# Patient Record
Sex: MALE | Race: WHITE | URBAN - METROPOLITAN AREA
[De-identification: names, ages, dates, MRNs, and addresses within clinical notes are randomized per-mention and may not be internally consistent; named-entity substitution may affect disease eponyms.]

---

## 2024-06-15 ENCOUNTER — APPOINTMENT (OUTPATIENT)
Dept: CT IMAGING | Age: 74
End: 2024-06-15
Payer: MEDICARE

## 2024-06-15 ENCOUNTER — HOSPITAL ENCOUNTER (INPATIENT)
Age: 74
LOS: 7 days | Discharge: HOME | End: 2024-06-22
Attending: EMERGENCY MEDICINE | Admitting: STUDENT IN AN ORGANIZED HEALTH CARE EDUCATION/TRAINING PROGRAM
Payer: MEDICARE

## 2024-06-15 DIAGNOSIS — I49.8 BIGEMINY: ICD-10-CM

## 2024-06-15 DIAGNOSIS — D69.6 THROMBOCYTOPENIA (HCC): Primary | ICD-10-CM

## 2024-06-15 PROBLEM — N18.32 CKD STAGE 3B, GFR 30-44 ML/MIN (HCC): Status: ACTIVE | Noted: 2024-06-15

## 2024-06-15 LAB
ABO + RH BLD: NORMAL
ANION GAP SERPL CALCULATED.3IONS-SCNC: 13 MMOL/L (ref 9–17)
ARM BAND NUMBER: NORMAL
BASOPHILS # BLD: 0.05 K/UL (ref 0–0.2)
BASOPHILS NFR BLD: 1 % (ref 0–2)
BLOOD BANK SAMPLE EXPIRATION: NORMAL
BLOOD GROUP ANTIBODIES SERPL: NEGATIVE
BUN SERPL-MCNC: 26 MG/DL (ref 8–23)
CALCIUM SERPL-MCNC: 9 MG/DL (ref 8.6–10.4)
CHLORIDE SERPL-SCNC: 112 MMOL/L (ref 98–107)
CO2 SERPL-SCNC: 20 MMOL/L (ref 20–31)
CREAT SERPL-MCNC: 1.4 MG/DL (ref 0.7–1.2)
EOSINOPHIL # BLD: 0.34 K/UL (ref 0–0.4)
EOSINOPHILS RELATIVE PERCENT: 7 % (ref 1–4)
ERYTHROCYTE [DISTWIDTH] IN BLOOD BY AUTOMATED COUNT: 13.9 % (ref 12.5–15.4)
GFR, ESTIMATED: 53 ML/MIN/1.73M2
GLUCOSE SERPL-MCNC: 110 MG/DL (ref 70–99)
HCT VFR BLD AUTO: 33.4 % (ref 41–53)
HGB BLD-MCNC: 11.3 G/DL (ref 13.5–17.5)
INR PPP: 1.1
LYMPHOCYTES NFR BLD: 0.86 K/UL (ref 1–4.8)
LYMPHOCYTES RELATIVE PERCENT: 18 % (ref 24–44)
MAGNESIUM SERPL-MCNC: 2.3 MG/DL (ref 1.6–2.6)
MCH RBC QN AUTO: 29 PG (ref 26–34)
MCHC RBC AUTO-ENTMCNC: 33.8 G/DL (ref 31–37)
MCV RBC AUTO: 85.7 FL (ref 80–100)
MONOCYTES NFR BLD: 0.62 K/UL (ref 0.1–1.2)
MONOCYTES NFR BLD: 13 % (ref 2–11)
MORPHOLOGY: NORMAL
NEUTROPHILS NFR BLD: 61 % (ref 36–66)
NEUTS SEG NFR BLD: 2.93 K/UL (ref 1.8–7.7)
PLATELET # BLD AUTO: 3 K/UL (ref 140–450)
PMV BLD AUTO: 10.7 FL (ref 6–12)
POTASSIUM SERPL-SCNC: 4.1 MMOL/L (ref 3.7–5.3)
PROTHROMBIN TIME: 12.1 SEC (ref 9.4–12.6)
RBC # BLD AUTO: 3.9 M/UL (ref 4.5–5.9)
SODIUM SERPL-SCNC: 145 MMOL/L (ref 135–144)
WBC OTHER # BLD: 4.8 K/UL (ref 3.5–11)

## 2024-06-15 PROCEDURE — 6370000000 HC RX 637 (ALT 250 FOR IP): Performed by: STUDENT IN AN ORGANIZED HEALTH CARE EDUCATION/TRAINING PROGRAM

## 2024-06-15 PROCEDURE — 86901 BLOOD TYPING SEROLOGIC RH(D): CPT

## 2024-06-15 PROCEDURE — 99285 EMERGENCY DEPT VISIT HI MDM: CPT

## 2024-06-15 PROCEDURE — 36430 TRANSFUSION BLD/BLD COMPNT: CPT

## 2024-06-15 PROCEDURE — 2580000003 HC RX 258: Performed by: STUDENT IN AN ORGANIZED HEALTH CARE EDUCATION/TRAINING PROGRAM

## 2024-06-15 PROCEDURE — 85025 COMPLETE CBC W/AUTO DIFF WBC: CPT

## 2024-06-15 PROCEDURE — 93005 ELECTROCARDIOGRAM TRACING: CPT | Performed by: NURSE PRACTITIONER

## 2024-06-15 PROCEDURE — 80048 BASIC METABOLIC PNL TOTAL CA: CPT

## 2024-06-15 PROCEDURE — 85610 PROTHROMBIN TIME: CPT

## 2024-06-15 PROCEDURE — 71250 CT THORAX DX C-: CPT

## 2024-06-15 PROCEDURE — 86850 RBC ANTIBODY SCREEN: CPT

## 2024-06-15 PROCEDURE — 2060000000 HC ICU INTERMEDIATE R&B

## 2024-06-15 PROCEDURE — 36415 COLL VENOUS BLD VENIPUNCTURE: CPT

## 2024-06-15 PROCEDURE — 83010 ASSAY OF HAPTOGLOBIN QUANT: CPT

## 2024-06-15 PROCEDURE — 83735 ASSAY OF MAGNESIUM: CPT

## 2024-06-15 PROCEDURE — 86900 BLOOD TYPING SEROLOGIC ABO: CPT

## 2024-06-15 PROCEDURE — 6360000002 HC RX W HCPCS: Performed by: INTERNAL MEDICINE

## 2024-06-15 PROCEDURE — P9073 PLATELETS PHERESIS PATH REDU: HCPCS

## 2024-06-15 PROCEDURE — 30233N1 TRANSFUSION OF NONAUTOLOGOUS RED BLOOD CELLS INTO PERIPHERAL VEIN, PERCUTANEOUS APPROACH: ICD-10-PCS | Performed by: INTERNAL MEDICINE

## 2024-06-15 PROCEDURE — 99222 1ST HOSP IP/OBS MODERATE 55: CPT | Performed by: STUDENT IN AN ORGANIZED HEALTH CARE EDUCATION/TRAINING PROGRAM

## 2024-06-15 PROCEDURE — 83615 LACTATE (LD) (LDH) ENZYME: CPT

## 2024-06-15 RX ORDER — PANTOPRAZOLE SODIUM 40 MG/1
40 TABLET, DELAYED RELEASE ORAL
Status: DISCONTINUED | OUTPATIENT
Start: 2024-06-16 | End: 2024-06-16

## 2024-06-15 RX ORDER — POTASSIUM CHLORIDE 20 MEQ/1
40 TABLET, EXTENDED RELEASE ORAL PRN
Status: DISCONTINUED | OUTPATIENT
Start: 2024-06-15 | End: 2024-06-22 | Stop reason: HOSPADM

## 2024-06-15 RX ORDER — FENOFIBRATE 160 MG/1
160 TABLET ORAL DAILY
Status: DISCONTINUED | OUTPATIENT
Start: 2024-06-15 | End: 2024-06-22 | Stop reason: HOSPADM

## 2024-06-15 RX ORDER — ACETAMINOPHEN 325 MG/1
650 TABLET ORAL EVERY 6 HOURS PRN
Status: DISCONTINUED | OUTPATIENT
Start: 2024-06-15 | End: 2024-06-22 | Stop reason: HOSPADM

## 2024-06-15 RX ORDER — SODIUM CHLORIDE 0.9 % (FLUSH) 0.9 %
5-40 SYRINGE (ML) INJECTION PRN
Status: DISCONTINUED | OUTPATIENT
Start: 2024-06-15 | End: 2024-06-22 | Stop reason: HOSPADM

## 2024-06-15 RX ORDER — MAGNESIUM SULFATE IN WATER 40 MG/ML
2000 INJECTION, SOLUTION INTRAVENOUS PRN
Status: DISCONTINUED | OUTPATIENT
Start: 2024-06-15 | End: 2024-06-22 | Stop reason: HOSPADM

## 2024-06-15 RX ORDER — SODIUM CHLORIDE 0.9 % (FLUSH) 0.9 %
5-40 SYRINGE (ML) INJECTION EVERY 12 HOURS SCHEDULED
Status: DISCONTINUED | OUTPATIENT
Start: 2024-06-15 | End: 2024-06-22 | Stop reason: HOSPADM

## 2024-06-15 RX ORDER — BRIMONIDINE TARTRATE 2 MG/ML
1 SOLUTION/ DROPS OPHTHALMIC 2 TIMES DAILY
Status: DISCONTINUED | OUTPATIENT
Start: 2024-06-15 | End: 2024-06-22 | Stop reason: HOSPADM

## 2024-06-15 RX ORDER — BRIMONIDINE TARTRATE 1 MG/ML
1 SOLUTION/ DROPS OPHTHALMIC 2 TIMES DAILY
COMMUNITY

## 2024-06-15 RX ORDER — HYDROCHLOROTHIAZIDE 25 MG/1
25 TABLET ORAL DAILY
Status: DISCONTINUED | OUTPATIENT
Start: 2024-06-16 | End: 2024-06-15

## 2024-06-15 RX ORDER — SODIUM CHLORIDE 9 MG/ML
INJECTION, SOLUTION INTRAVENOUS PRN
Status: DISCONTINUED | OUTPATIENT
Start: 2024-06-15 | End: 2024-06-22 | Stop reason: HOSPADM

## 2024-06-15 RX ORDER — CARVEDILOL 12.5 MG/1
25 TABLET ORAL 2 TIMES DAILY WITH MEALS
Status: DISCONTINUED | OUTPATIENT
Start: 2024-06-15 | End: 2024-06-22 | Stop reason: HOSPADM

## 2024-06-15 RX ORDER — LISINOPRIL 40 MG/1
40 TABLET ORAL DAILY
COMMUNITY

## 2024-06-15 RX ORDER — ACETAMINOPHEN 650 MG/1
650 SUPPOSITORY RECTAL EVERY 6 HOURS PRN
Status: DISCONTINUED | OUTPATIENT
Start: 2024-06-15 | End: 2024-06-22 | Stop reason: HOSPADM

## 2024-06-15 RX ORDER — POTASSIUM CHLORIDE 7.45 MG/ML
10 INJECTION INTRAVENOUS PRN
Status: DISCONTINUED | OUTPATIENT
Start: 2024-06-15 | End: 2024-06-22 | Stop reason: HOSPADM

## 2024-06-15 RX ORDER — ONDANSETRON 4 MG/1
4 TABLET, ORALLY DISINTEGRATING ORAL EVERY 8 HOURS PRN
Status: DISCONTINUED | OUTPATIENT
Start: 2024-06-15 | End: 2024-06-22 | Stop reason: HOSPADM

## 2024-06-15 RX ORDER — VALSARTAN 160 MG/1
320 TABLET ORAL DAILY
Status: DISCONTINUED | OUTPATIENT
Start: 2024-06-16 | End: 2024-06-15

## 2024-06-15 RX ORDER — VALSARTAN AND HYDROCHLOROTHIAZIDE 320; 25 MG/1; MG/1
1 TABLET, FILM COATED ORAL DAILY
Status: DISCONTINUED | OUTPATIENT
Start: 2024-06-16 | End: 2024-06-15

## 2024-06-15 RX ORDER — POLYETHYLENE GLYCOL 3350 17 G/17G
17 POWDER, FOR SOLUTION ORAL DAILY PRN
Status: DISCONTINUED | OUTPATIENT
Start: 2024-06-15 | End: 2024-06-22 | Stop reason: HOSPADM

## 2024-06-15 RX ORDER — SODIUM CHLORIDE 9 MG/ML
INJECTION, SOLUTION INTRAVENOUS PRN
Status: COMPLETED | OUTPATIENT
Start: 2024-06-15 | End: 2024-06-15

## 2024-06-15 RX ORDER — LISINOPRIL 20 MG/1
40 TABLET ORAL DAILY
Status: DISCONTINUED | OUTPATIENT
Start: 2024-06-16 | End: 2024-06-22 | Stop reason: HOSPADM

## 2024-06-15 RX ORDER — ONDANSETRON 2 MG/ML
4 INJECTION INTRAMUSCULAR; INTRAVENOUS EVERY 6 HOURS PRN
Status: DISCONTINUED | OUTPATIENT
Start: 2024-06-15 | End: 2024-06-22 | Stop reason: HOSPADM

## 2024-06-15 RX ORDER — ATORVASTATIN CALCIUM 20 MG/1
20 TABLET, FILM COATED ORAL DAILY
Status: DISCONTINUED | OUTPATIENT
Start: 2024-06-15 | End: 2024-06-22 | Stop reason: HOSPADM

## 2024-06-15 RX ORDER — DEXAMETHASONE 4 MG/1
40 TABLET ORAL DAILY
Status: COMPLETED | OUTPATIENT
Start: 2024-06-15 | End: 2024-06-18

## 2024-06-15 RX ORDER — CARVEDILOL 12.5 MG/1
25 TABLET ORAL 2 TIMES DAILY WITH MEALS
Status: CANCELLED | OUTPATIENT
Start: 2024-06-15

## 2024-06-15 RX ADMIN — SODIUM CHLORIDE: 9 INJECTION, SOLUTION INTRAVENOUS at 21:36

## 2024-06-15 RX ADMIN — CARVEDILOL 25 MG: 12.5 TABLET, FILM COATED ORAL at 17:49

## 2024-06-15 RX ADMIN — BRIMONIDINE TARTRATE 1 DROP: 2 SOLUTION OPHTHALMIC at 21:37

## 2024-06-15 RX ADMIN — FENOFIBRATE 160 MG: 160 TABLET ORAL at 17:47

## 2024-06-15 RX ADMIN — ATORVASTATIN CALCIUM 20 MG: 20 TABLET, FILM COATED ORAL at 17:47

## 2024-06-15 RX ADMIN — DEXAMETHASONE 40 MG: 4 TABLET ORAL at 21:37

## 2024-06-15 RX ADMIN — SODIUM CHLORIDE, PRESERVATIVE FREE 10 ML: 5 INJECTION INTRAVENOUS at 21:34

## 2024-06-15 ASSESSMENT — PAIN - FUNCTIONAL ASSESSMENT: PAIN_FUNCTIONAL_ASSESSMENT: NONE - DENIES PAIN

## 2024-06-15 NOTE — ED PROVIDER NOTES
[facility] EMERGENCY DEPARTMENT  EMERGENCY DEPARTMENT ENCOUNTER      Pt Name: Bruno Morales  MRN: [id number]  Birthdate 1950  Date of evaluation: 6/15/2024  Provider: CRISTHIAN Clements CNP  4:01 PM    CHIEF COMPLAINT     No chief complaint on file.        HISTORY OF PRESENT ILLNESS    Bruno Morales is a 73 y.o. male who presents to the emergency department for evaluation of abnormal labs.  Patient states that he went to a [facility] draw site this morning to have labs drawn because he has been bruising easily, his family doctor called him back to let them know that he had severe thrombocytopenia and to come in.  Labs reviewed, platelet count is less than 3.  He states he has been bruising easily for the past month or so.  He reports some bleeding from the gumline.  He was eating an ice cream cone and feels that he may have had a piece of ice cream, lodged by his teeth.  He has a bruise to the left forearm and he has a bruise to the right lower leg, bilateral lower legs have petechiae.  No chest pain no shortness of breath.  He does report some lightheadedness was discussing this with his family doctor yesterday and they decreased his amlodipine.  He denies any trauma.  No falls.    HPI    Nursing Notes were reviewed.    REVIEW OF SYSTEMS       Review of Systems   All other systems reviewed and are negative.      Except as noted above the remainder of the review of systems was reviewed and negative.       PAST MEDICAL HISTORY     Past Medical History:   Diagnosis Date    Hyperlipidemia     Hypertension          SURGICAL HISTORY       Past Surgical History:   Procedure Laterality Date    TONSILLECTOMY           CURRENT MEDICATIONS       Previous Medications    CARVEDILOL (COREG) 12.5 MG TABLET    Take 25 mg by mouth 2 times daily (with meals)    CLOPIDOGREL (PLAVIX) 75 MG TABLET    Take 75 mg by mouth daily    FENOFIBRATE 160 MG TABLET    Take 160 mg by mouth daily    LORATADINE 10 MG  returned as of this dictation.    EMERGENCY DEPARTMENT COURSE and DIFFERENTIAL DIAGNOSIS/MDM:   Vitals:    Vitals:    06/15/24 1536 06/15/24 1539   BP:  (!) 178/67   Pulse: 70 72   Resp: 22 27   Temp: 98.3 °F (36.8 °C)    TempSrc: Oral    SpO2: 97% 96%   Weight: 71.7 kg (158 lb)    Height: 1.778 m (5' 10\")            Medical Decision Making  Amount and/or Complexity of Data Reviewed  Labs: ordered.  ECG/medicine tests: ordered.    Risk  Decision regarding hospitalization.        Patient Mesfin to the emergency department for evaluation of thrombocytopenia.  He had labs drawn this morning which showed platelet count less than 3.  No history of this.  Old records reviewed and in October, his platelets were 159.  He is on Plavix but did not take his dose today.  He has not fallen.  He does have petechial rash to bilateral lower extremities and some bruising on the left forearm.  No fevers no chills no nausea no vomiting no headache no chest pain no shortness of breath.  Case was discussed with Dr. Be who is agreeable to admit.  At this point, he states he will take care of ordering CTs, consultation with heme-onc        REASSESSMENT          CRITICAL CARE TIME       CONSULTS:  IP CONSULT TO ONCOLOGY    PROCEDURES:  Unless otherwise noted below, none     Procedures        FINAL IMPRESSION      1. Thrombocytopenia (HCC)          DISPOSITION/PLAN   DISPOSITION Admitted 06/15/2024 03:48:27 PM      PATIENT REFERRED TO:  No follow-up provider specified.    DISCHARGE MEDICATIONS:  New Prescriptions    No medications on file     Controlled Substances Monitoring:          No data to display                (Please note that portions of this note were completed with a voice recognition program.  Efforts were made to edit the dictations but occasionally words are mis-transcribed.)    CRISTHIAN Clements CNP (electronically signed)           Carli Barreto APRN - CNP  06/15/24 1431

## 2024-06-15 NOTE — ED NOTES
Lab called writer and informed that they will not have plasma ready for the next couple of hours. Lab stated they will call the floor when plasma is ready.

## 2024-06-15 NOTE — PROGRESS NOTES
SPIRITUAL CARE DEPARTMENT [facility]  PROGRESS NOTE    Room # 333/333-01   Name: Bruno Morales            Holiness: Yarsani     Reason for visit: Routine    I visited the patient.    Admit Date & Time: 6/15/2024  3:29 PM    Assessment:  Bruno Morales is a 73 y.o. male in the hospital because thrombocytopenia. Upon entering the room Pt and spouse were welcoming and open to conversation. Pt was admitted .      Intervention:  I introduced myself and my title as  I offered space for Pt  to express feelings, needs, and concerns and provided a ministry presence.  C   provided support and care to Pt.  provided empathic listening to spouse as Pt was in a procedure.    Outcome:  Pt was open to conversation and spouse shared openly about joseline and family and care for Pt. Good visit.  will follow up .    Plan:  Chaplains will remain available to offer spiritual and emotional support as needed.    Electronically signed by Chaplain DREW, on 6/15/2024 at 6:21 PM.  Spiritual Care Department  [facility]      06/15/24 1819   Encounter Summary   Encounter Overview/Reason Initial Encounter   Service Provided For Patient;Significant other   Support System Spouse   Last Encounter  06/15/24   Complexity of Encounter Moderate   Begin Time 1520   End Time  1535   Total Time Calculated 15 min   Crisis   Type Trauma   Spiritual/Emotional needs   Type Spiritual Support;Emotional Distress   Grief, Loss, and Adjustments   Type Life Adjustments   Assessment/Intervention/Outcome   Assessment Calm;Coping   Intervention Active listening;Discussed belief system/Sikhism practices/joseline   Outcome Comfort;Coping;Encouraged   Plan and Referrals   Plan/Referrals Continue to visit, (comment)

## 2024-06-15 NOTE — ED PROVIDER NOTES
[facility] Emergency Department  [address]  [place] [address]  Phone: [phone]  Fax: [phone]      Attending Physician Attestation    I performed a history and physical examination of the patient and discussed management with the mid level provider. I reviewed the mid level provider's note and agree with the documented findings and plan of care. Any areas of disagreement are noted on the chart. I was personally present for the key portions of any procedures. I have documented in the chart those procedures where I was not present during the key portions. I have reviewed the emergency nurses triage note. I agree with the chief complaint, past medical history, past surgical history, allergies, medications, social and family history as documented unless otherwise noted below. Documentation of the HPI, Physical Exam and Medical Decision Making performed by mid level providers is based on my personal performance of the HPI, PE and MDM. For Physician Assistant/ Nurse Practitioner cases/documentation I have personally evaluated this patient and have completed at least one if not all key elements of the E/M (history, physical exam, and MDM). Additional findings are as noted.      CHIEF COMPLAINT     No chief complaint on file.        HISTORY OF PRESENT ILLNESS    Bruno Morales is a 73 y.o. male who presents to the emergency department today sent here by his personal physician.  He was seen by his doctor yesterday for typical visit.  He did have his amlodipine dose adjusted because he was complaining of feeling lightheaded and dizzy.  Seems to be intermittent and not present all the time.  It can occur at any position.  No chest pain.  No nausea or vomiting.  He does report some blood on his pillow this morning some bleeding from his teeth when he was eating ice cream.  He was sent here because blood work was worrisome because his platelet count was severely low.  He has  rhythm with first-degree AV block and frequent PVCs.  He has left axis deviation and a right bundle branch block.  I do not appreciate any acute ST segment changes.    RADIOLOGY:   Non-plain film images such as CT, Ultrasound and MRI are read by the radiologist. Plain radiographic images are visualized and the radiologist interpretations are reviewed as follows:     None ordered    LABS:  No results found for this visit on 06/15/24.        EMERGENCY DEPARTMENT COURSE:   Vitals:    Vitals:    06/15/24 1536 06/15/24 1539   BP:  (!) 178/67   Pulse: 70    Resp: 22    Temp: 98.3 °F (36.8 °C)    TempSrc: Oral    SpO2: 97%    Weight: 71.7 kg (158 lb)    Height: 1.778 m (5' 10\")      -------------------------  BP: (!) 178/67, Temp: 98.3 °F (36.8 °C), Pulse: 70, Respirations: 22      PERTINENT ATTENDING PHYSICIAN COMMENTS:    We will repeat blood work to confirm thrombocytopenia.  He will require admission.  I feel the lightheadedness is dizziness is due to functional bradycardia because the PVCs are not perfusing.      Case has been reviewed with hospitalist who is kind enough to admit this patient    (Please note that portions of this note were completed with a voice recognition program.  Efforts were made to edit the dictations but occasionally words are mis-transcribed.)    Homer Augustine MD,, MD, F.A.C.E.P.  Attending Emergency Medicine Physician        Homer Augustine MD  06/15/24 1600

## 2024-06-15 NOTE — H&P
[facility]  Office: [phone]  Phani Andino DO, Carlos A Hernandez DO, Sandor Rivera DO, Dre Marroquin DO, Cecelia Murillo MD, Rosie Robbins MD, Castro Hope MD, Becka Sharma MD,  Wolf Reardon MD, Edna Truong MD, Jamee Padilla MD,  Bia Weir DO, Jacob Rilye MD, Rk Be MD, Diego Andino DO, Viviana Marroquin MD,  Jesse Mahan DO, Lauryn Rose MD, Paula Ramsay MD, Alyssia Henley MD, Chari Pastor MD,  Reynold Lennon MD, Luca Eckert MD, Speedy Durham MD, Devyn Moreno MD, Heber Valenzuela MD, David Paiz MD, Chava Nicole DO, Silverio Barrett DO, Lana Ahuja MD,  Sebastian Braswell MD, Shirley Waterhouse, CNP,  Darlyn Savage CNP, Eagle Yoon, CNP,  Luzmaria Mitchell, DNP, Tonja Vizcaino, CNP, Tiff Hamilton, CNP, Glenna Taylor, CNP, Lili Cool, CNP, Amina Harrell, PA-C, Cheyenne Parker PA-C, Letitia Lynch, CNP, Jacque Lorenzo, CNP, Violette Romo, CNP, Kelly Minor, CNP, Marilynn Acuña, CNP, Lluvia Anand, CNS, Nerissa Resendiz, CNP, Ashley Benítez CNP, Tracy Schwab, CNP         [facility]   IN-PATIENT SERVICE   [facility]    HISTORY AND PHYSICAL EXAMINATION            Date:   6/15/2024  Patient name:  Bruno Morales  Date of admission:  6/15/2024  3:29 PM  MRN:   [id number]  Account:  [id number]  YOB: 1950  PCP:    Margarette Vallejo MD  [facility]  Code Status:    No Order      History Obtained From:     patient    History of Present Illness:     Bruno Morales is a 73 y.o. male with a past medical history of hypertension and hyperlipidemia who presented to the emergency department on 6/15/2024 complaining of abnormal lab. The patient had labs drawn this morning and was instructed to go to the emergency department after being found to be thrombocytopenic with a platelet count of 3. The patient's platelet count was previously normal. He endorses dizziness and fatigue but reports feeling well otherwise. The

## 2024-06-16 LAB
ANION GAP SERPL CALCULATED.3IONS-SCNC: 13 MMOL/L (ref 9–17)
BASOPHILS # BLD: 0 K/UL (ref 0–0.2)
BASOPHILS NFR BLD: 0 % (ref 0–2)
BLOOD BANK BLOOD PRODUCT EXPIRATION DATE: NORMAL
BLOOD BANK DISPENSE STATUS: NORMAL
BLOOD BANK ISBT PRODUCT BLOOD TYPE: 5100
BLOOD BANK PRODUCT CODE: NORMAL
BLOOD BANK UNIT TYPE AND RH: NORMAL
BPU ID: NORMAL
BUN SERPL-MCNC: 25 MG/DL (ref 8–23)
CALCIUM SERPL-MCNC: 9.1 MG/DL (ref 8.6–10.4)
CHLORIDE SERPL-SCNC: 112 MMOL/L (ref 98–107)
CO2 SERPL-SCNC: 18 MMOL/L (ref 20–31)
COMPONENT: NORMAL
CREAT SERPL-MCNC: 1.2 MG/DL (ref 0.7–1.2)
DAT POLY-SP REAG RBC QL: NEGATIVE
EOSINOPHIL # BLD: 0 K/UL (ref 0–0.4)
EOSINOPHILS RELATIVE PERCENT: 0 % (ref 1–4)
ERYTHROCYTE [DISTWIDTH] IN BLOOD BY AUTOMATED COUNT: 14 % (ref 12.5–15.4)
GFR, ESTIMATED: 63 ML/MIN/1.73M2
GLUCOSE BLD-MCNC: 192 MG/DL (ref 75–110)
GLUCOSE BLD-MCNC: 212 MG/DL (ref 75–110)
GLUCOSE BLD-MCNC: 218 MG/DL (ref 75–110)
GLUCOSE SERPL-MCNC: 167 MG/DL (ref 70–99)
HAPTOGLOB SERPL-MCNC: <10 MG/DL (ref 30–200)
HAPTOGLOB SERPL-MCNC: <10 MG/DL (ref 30–200)
HCT VFR BLD AUTO: 32.7 % (ref 41–53)
HGB BLD-MCNC: 11.3 G/DL (ref 13.5–17.5)
IMM RETICS NFR: 17.6 % (ref 2.7–18.3)
LDH SERPL-CCNC: 259 U/L (ref 135–225)
LDH SERPL-CCNC: 264 U/L (ref 135–225)
LYMPHOCYTES NFR BLD: 0.37 K/UL (ref 1–4.8)
LYMPHOCYTES RELATIVE PERCENT: 9 % (ref 24–44)
MCH RBC QN AUTO: 29.4 PG (ref 26–34)
MCHC RBC AUTO-ENTMCNC: 34.5 G/DL (ref 31–37)
MCV RBC AUTO: 85.2 FL (ref 80–100)
MONOCYTES NFR BLD: 0.04 K/UL (ref 0.1–0.8)
MONOCYTES NFR BLD: 1 % (ref 1–7)
MORPHOLOGY: NORMAL
NEUTROPHILS NFR BLD: 90 % (ref 36–66)
NEUTS SEG NFR BLD: 3.69 K/UL (ref 1.8–7.7)
PLATELET # BLD AUTO: 5 K/UL (ref 140–450)
PMV BLD AUTO: 10.1 FL (ref 6–12)
POTASSIUM SERPL-SCNC: 3.8 MMOL/L (ref 3.7–5.3)
RBC # BLD AUTO: 3.83 M/UL (ref 4.5–5.9)
RETIC HEMOGLOBIN: 32.3 PG (ref 28.2–35.7)
RETICS # AUTO: 0.15 M/UL (ref 0.03–0.08)
RETICS/RBC NFR AUTO: 4 % (ref 0.5–1.9)
SODIUM SERPL-SCNC: 143 MMOL/L (ref 135–144)
TRANSFUSION STATUS: NORMAL
UNIT DIVISION: 0
UNIT ISSUE DATE/TIME: NORMAL
WBC OTHER # BLD: 4.1 K/UL (ref 3.5–11)

## 2024-06-16 PROCEDURE — 6370000000 HC RX 637 (ALT 250 FOR IP): Performed by: INTERNAL MEDICINE

## 2024-06-16 PROCEDURE — 99223 1ST HOSP IP/OBS HIGH 75: CPT | Performed by: INTERNAL MEDICINE

## 2024-06-16 PROCEDURE — 85025 COMPLETE CBC W/AUTO DIFF WBC: CPT

## 2024-06-16 PROCEDURE — 6360000002 HC RX W HCPCS: Performed by: INTERNAL MEDICINE

## 2024-06-16 PROCEDURE — 36415 COLL VENOUS BLD VENIPUNCTURE: CPT

## 2024-06-16 PROCEDURE — 83010 ASSAY OF HAPTOGLOBIN QUANT: CPT

## 2024-06-16 PROCEDURE — 6370000000 HC RX 637 (ALT 250 FOR IP): Performed by: STUDENT IN AN ORGANIZED HEALTH CARE EDUCATION/TRAINING PROGRAM

## 2024-06-16 PROCEDURE — 83615 LACTATE (LD) (LDH) ENZYME: CPT

## 2024-06-16 PROCEDURE — 2060000000 HC ICU INTERMEDIATE R&B

## 2024-06-16 PROCEDURE — 80048 BASIC METABOLIC PNL TOTAL CA: CPT

## 2024-06-16 PROCEDURE — 99232 SBSQ HOSP IP/OBS MODERATE 35: CPT | Performed by: STUDENT IN AN ORGANIZED HEALTH CARE EDUCATION/TRAINING PROGRAM

## 2024-06-16 PROCEDURE — 2580000003 HC RX 258: Performed by: STUDENT IN AN ORGANIZED HEALTH CARE EDUCATION/TRAINING PROGRAM

## 2024-06-16 PROCEDURE — 82947 ASSAY GLUCOSE BLOOD QUANT: CPT

## 2024-06-16 PROCEDURE — 85045 AUTOMATED RETICULOCYTE COUNT: CPT

## 2024-06-16 PROCEDURE — 86880 COOMBS TEST DIRECT: CPT

## 2024-06-16 PROCEDURE — 99222 1ST HOSP IP/OBS MODERATE 55: CPT | Performed by: INTERNAL MEDICINE

## 2024-06-16 RX ORDER — GLUCAGON 1 MG/ML
1 KIT INJECTION PRN
Status: DISCONTINUED | OUTPATIENT
Start: 2024-06-16 | End: 2024-06-22 | Stop reason: HOSPADM

## 2024-06-16 RX ORDER — INSULIN LISPRO 100 [IU]/ML
0-4 INJECTION, SOLUTION INTRAVENOUS; SUBCUTANEOUS NIGHTLY
Status: DISCONTINUED | OUTPATIENT
Start: 2024-06-16 | End: 2024-06-22 | Stop reason: HOSPADM

## 2024-06-16 RX ORDER — DEXTROSE MONOHYDRATE 100 MG/ML
INJECTION, SOLUTION INTRAVENOUS CONTINUOUS PRN
Status: DISCONTINUED | OUTPATIENT
Start: 2024-06-16 | End: 2024-06-22 | Stop reason: HOSPADM

## 2024-06-16 RX ORDER — INSULIN LISPRO 100 [IU]/ML
0-4 INJECTION, SOLUTION INTRAVENOUS; SUBCUTANEOUS
Status: DISCONTINUED | OUTPATIENT
Start: 2024-06-16 | End: 2024-06-22 | Stop reason: HOSPADM

## 2024-06-16 RX ADMIN — ATORVASTATIN CALCIUM 20 MG: 20 TABLET, FILM COATED ORAL at 08:43

## 2024-06-16 RX ADMIN — CARVEDILOL 25 MG: 12.5 TABLET, FILM COATED ORAL at 17:37

## 2024-06-16 RX ADMIN — DEXAMETHASONE 40 MG: 4 TABLET ORAL at 08:41

## 2024-06-16 RX ADMIN — DILTIAZEM HYDROCHLORIDE 30 MG: 30 TABLET, FILM COATED ORAL at 14:18

## 2024-06-16 RX ADMIN — INSULIN LISPRO 1 UNITS: 100 INJECTION, SOLUTION INTRAVENOUS; SUBCUTANEOUS at 17:37

## 2024-06-16 RX ADMIN — SODIUM CHLORIDE, PRESERVATIVE FREE 10 ML: 5 INJECTION INTRAVENOUS at 21:23

## 2024-06-16 RX ADMIN — DILTIAZEM HYDROCHLORIDE 30 MG: 30 TABLET, FILM COATED ORAL at 21:17

## 2024-06-16 RX ADMIN — LANSOPRAZOLE 30 MG: 15 TABLET, ORALLY DISINTEGRATING ORAL at 15:25

## 2024-06-16 RX ADMIN — CARVEDILOL 25 MG: 12.5 TABLET, FILM COATED ORAL at 08:46

## 2024-06-16 RX ADMIN — LISINOPRIL 40 MG: 20 TABLET ORAL at 08:45

## 2024-06-16 RX ADMIN — DILTIAZEM HYDROCHLORIDE 30 MG: 30 TABLET, FILM COATED ORAL at 08:52

## 2024-06-16 RX ADMIN — BRIMONIDINE TARTRATE 1 DROP: 2 SOLUTION OPHTHALMIC at 08:49

## 2024-06-16 RX ADMIN — FENOFIBRATE 160 MG: 160 TABLET ORAL at 08:47

## 2024-06-16 RX ADMIN — PANTOPRAZOLE SODIUM 40 MG: 40 TABLET, DELAYED RELEASE ORAL at 05:12

## 2024-06-16 RX ADMIN — IMMUNE GLOBULIN (HUMAN) 70 G: 10 INJECTION INTRAVENOUS; SUBCUTANEOUS at 15:22

## 2024-06-16 RX ADMIN — BRIMONIDINE TARTRATE 1 DROP: 2 SOLUTION OPHTHALMIC at 21:21

## 2024-06-16 NOTE — CONSULTS
Today's Date: 6/16/2024  Patient Name: Bruno Morales  Date of admission: 6/15/2024  3:29 PM  Patient's age: 73 y.o., 1950  Admission Dx: Bigeminy [I49.8]  Thrombocytopenia (HCC) [D69.6]    Reason for Consult: management recommendations  Requesting Physician: Rk Be MD    CHIEF COMPLAINT: Severe thrombocytopenia    History Obtained From:  patient    HISTORY OF PRESENT ILLNESS:      The patient is a 73 y.o.   male who is admitted to the hospital for petechiae and was found to have severe thrombocytopenia which is new onset.  The patient describes fatigue, mucosal bleeding from the mouth.  No hemorrhagic diatheses otherwise.  No hematuria or blood in the stool.  Upon evaluation, platelets were down to 30,000.  Evaluation of blood smear was unremarkable.  No evidence of hemolysis based on basic workup.  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  Past Medical History:   has a past medical history of Hyperlipidemia and Hypertension.    Past Surgical History:   has a past surgical history that includes Tonsillectomy.     Medications:    Reviewed in Epic     Allergies:  Asa [aspirin], Fish-derived products, Penicillins, and Tetracyclines & related    Social History:   reports that he has never smoked. He has never used smokeless tobacco. He reports that he does not drink alcohol and does not use drugs.     Family History: family history is not on file.    REVIEW OF SYSTEMS:    Constitutional: No fever or chills. No night sweats, no weight loss   Eyes: No eye discharge, double vision, or eye pain   HEENT: negative for sore mouth, sore throat, hoarseness and voice change   Respiratory: negative for cough , sputum, dyspnea, wheezing, hemoptysis, chest pain   Cardiovascular: negative for chest pain, dyspnea, palpitations, orthopnea, PND   Gastrointestinal: negative for nausea, vomiting, diarrhea, constipation, abdominal pain, Dysphagia, hematemesis and hematochezia    Genitourinary: negative for frequency, dysuria, nocturia, urinary incontinence, and hematuria   Integument: Petechiae and bruises as discussed  Hematologic/Lymphatic: negative for easy bruising, bleeding, lymphadenopathy, or petechiae   Endocrine: negative for heat or cold intolerance,weight changes, change in bowel habits and hair loss   Musculoskeletal: negative for myalgias, arthralgias, pain, joint swelling,and bone pain   Neurological: negative for headaches, dizziness, seizures, weakness, numbness    PHYSICAL EXAM:      /67   Pulse 68   Temp 98.4 °F (36.9 °C)   Resp 16   Ht 1.778 m (5' 10\")   Wt 71.7 kg (158 lb)   SpO2 95%   BMI 22.67 kg/m²    Temp (24hrs), Av °F (36.7 °C), Min:97.5 °F (36.4 °C), Max:98.4 °F (36.9 °C)    General appearance - well appearing, no in pain or distress   Mental status - alert and cooperative   Eyes - pupils equal and reactive, extraocular eye movements intact   Ears - bilateral TM's and external ear canals normal   Mouth - mucous membranes moist, pharynx normal without lesions   Neck - supple, no significant adenopathy   Lymphatics - no palpable lymphadenopathy, no hepatosplenomegaly   Chest - clear to auscultation, no wheezes, rales or rhonchi, symmetric air entry   Heart - normal rate, regular rhythm, normal S1, S2, no murmurs  Abdomen - soft, nontender, nondistended, no masses or organomegaly   Neurological - alert, oriented, normal speech, no focal findings or movement disorder noted   Musculoskeletal - no joint tenderness, deformity or swelling   Extremities - peripheral pulses normal, no pedal edema, no clubbing or cyanosis   Skin -  petechiae and extensive bruising    DATA:    Labs:   CBC:   Recent Labs     06/15/24  1551 24  0658   WBC 4.8 4.1   HGB 11.3* 11.3*   HCT 33.4* 32.7*   PLT 3* 5*     BMP:   Recent Labs     06/15/24  1551 24  0658   * 143   K 4.1 3.8   CO2 20 18*   BUN 26* 25*   CREATININE 1.4* 1.2   LABGLOM 53* 63   GLUCOSE

## 2024-06-16 NOTE — PROGRESS NOTES
[facility]  Office: [phone]  Phani Andino DO, Carlos A Hernandez, DO, Sandor Rivera DO, Dre Marroquin, DO, Cecelia Murillo MD, Rosie Robbins MD, Castro Hope MD, Becka Sharma MD,  Wolf Reardon MD, Edna Truong MD, Jamee Padilla MD,  Bia Weir DO, Jacob Riley MD, Rk Be MD, Diego Andino DO, Viviana Marroquin MD,  Jesse Mahan DO, Lauryn Rose MD, Paula Ramsay MD, Alyssia Henley MD, Chari Pastor MD,  Reynold Lennon MD, Luca Eckert MD, Speedy Durham MD, Devyn Moreno MD, Heber Valenzuela MD, David Paiz MD, Chava Nicole DO, Silverio Barrett DO, Lana Ahuja MD,  Sebastian Braswell MD, Shirley Waterhouse, CNP,  Darlyn Savage CNP, Eagle Yoon, CNP,  Luzmaria Mitchell, DNP, Tonja Vizcaino, CNP, Tiff Hamilton, CNP, Glenna Taylor CNP, Lili Cool, CNP, Amina Harrell, PA-C, Cheyenne Parker PA-C, Letitia Lynch, CNP, Jacque Lorenzo, CNP, Violette Romo, CNP, Kelly Minor, CNP, Marilynn Acuña, CNP, Lluvia Anand, CNS, Nerissa Resendiz, CNP, Ashley Benítez CNP, Tracy Schwab, CNP         [facility]   IN-PATIENT SERVICE   [facility]    Progress Note    6/16/2024    8:38 AM    Name:   Bruno Morales  MRN:     [id number]     Acct:      [id number]   Room:   333/333-01   Day:  1  Admit Date:  6/15/2024  3:29 PM    PCP:   Margarette Vallejo MD  Code Status:  Full Code    Subjective:     Patient was seen and examined at bedside this AM. He reports feeling well and has no specific complaints this morning. Oncology is following and Decadron has been started for presumed ITP. Plan to continue current management and monitor for improvement in platelet count.     Medications:     Allergies:    Allergies   Allergen Reactions    Asa [Aspirin]     Fish-Derived Products     Penicillins     Tetracyclines & Related        Current Meds:   Scheduled Meds:    dilTIAZem  30 mg Oral 3 times per day    fenofibrate  160 mg Oral Daily    atorvastatin  20 mg Oral Daily     sodium chloride flush  5-40 mL IntraVENous 2 times per day    brimonidine  1 drop Left Eye BID    carvedilol  25 mg Oral BID WC    lisinopril  40 mg Oral Daily    dexAMETHasone  40 mg Oral Daily    pantoprazole  40 mg Oral QAM AC     Continuous Infusions:    sodium chloride       PRN Meds: sodium chloride flush, sodium chloride, potassium chloride **OR** potassium alternative oral replacement **OR** potassium chloride, magnesium sulfate, ondansetron **OR** ondansetron, polyethylene glycol, acetaminophen **OR** acetaminophen    Data:     Vitals:  /89   Pulse 69   Temp 98.4 °F (36.9 °C)   Resp 16   Ht 1.778 m (5' 10\")   Wt 71.7 kg (158 lb)   SpO2 97%   BMI 22.67 kg/m²   Temp (24hrs), Av °F (36.7 °C), Min:97.5 °F (36.4 °C), Max:98.4 °F (36.9 °C)    No results for input(s): \"POCGLU\" in the last 72 hours.    I/O (24Hr):    Intake/Output Summary (Last 24 hours) at 2024 0838  Last data filed at 2024 0711  Gross per 24 hour   Intake 356.37 ml   Output 750 ml   Net -393.63 ml       Labs:  Hematology:  Recent Labs     06/15/24  1551 24  0658   WBC 4.8 4.1   RBC 3.90* 3.83*   HGB 11.3* 11.3*   HCT 33.4* 32.7*   MCV 85.7 85.2   MCH 29.0 29.4   MCHC 33.8 34.5   RDW 13.9 14.0   PLT 3* 5*   MPV 10.7 10.1   INR 1.1  --      Chemistry:  Recent Labs     06/15/24  1551 24  0658   * 143   K 4.1 3.8   * 112*   CO2 20 18*   GLUCOSE 110* 167*   BUN 26* 25*   CREATININE 1.4* 1.2   MG 2.3  --    ANIONGAP 13 13   LABGLOM 53* 63   CALCIUM 9.0 9.1   No results for input(s): \"PROT\", \"LABALBU\", \"LABA1C\", \"N6QGVST\", \"X1HNXPT\", \"FT4\", \"TSH\", \"AST\", \"ALT\", \"LDH\", \"GGT\", \"ALKPHOS\", \"BILITOT\", \"BILIDIR\", \"AMMONIA\", \"AMYLASE\", \"LIPASE\", \"LACTATE\", \"CHOL\", \"HDL\", \"CHOLHDLRATIO\", \"TRIG\", \"VLDL\", \"XFH49JL\", \"PHENYTOIN\", \"PHENYF\", \"URICACID\", \"POCGLU\" in the last 72 hours.    Invalid input(s): \"LABGGT\", \"LDLCHOLESTEROL\"  ABG:No results found for: \"POCPH\", \"PHART\", \"PH\", \"POCPCO2\", \"UVW8FSZ\", \"PCO2\",

## 2024-06-16 NOTE — PROGRESS NOTES
Physical Therapy                                                             Physical Therapy Cancel Note      DATE: 2024    NAME: Bruno Morales  MRN: [id number]   : 1950      Patient not seen this date for Physical Therapy due to:    Other: RN indicate pt up to bathroom with nursing only due to thrombocytopenia and to receive steroids but no difficulty with mobility and gait. Patient had presented to the emergency department for evaluation of abnormal labs.  [facility] because he has been bruising easily . Pt currently no acute therapy needs.   PLAN: discontinue acute therapy evaluation. Will need resume therapy if order if status changes and requires skilled therapy.       Electronically signed by Lyubov Aggarwal PT on 2024 at 9:06 AM

## 2024-06-16 NOTE — CONSULTS
Cardiology Consultation         Date:   6/16/2024  Patient name: Bruno Mroales  Date of admission:  6/15/2024  3:29 PM  MRN:   [id number]  YOB: 1950    Reason for Admission: severe thrombocytopenia     Chief Complaint: abnormal labs     History of present illness:     73 yr old male with no prior cardiac hx sent to hospital by PCP for abnormal labs showing severe thrombocytopenia, platelet counts 3K, blood work was ordered for frequent bruising and tooth bleeding. CT chest abdomen shows mediastinal lymphadenopathy with multiple lung nodules.  Hematology-oncology has been consulted. Patient denies any chest pain, leg edema or orthopnea. He does have mild dyspnea on exertion with fatigue and occasional dizziness. No syncope or near syncope. He is noted to have frequent PVCs on ECG and telemetry.     Past Medical History:   has a past medical history of Hyperlipidemia and Hypertension.    Past Surgical History:   has a past surgical history that includes Tonsillectomy.     Home Medications:    Prior to Admission medications    Medication Sig Start Date End Date Taking? Authorizing Provider   lisinopril (PRINIVIL;ZESTRIL) 40 MG tablet Take 1 tablet by mouth daily   Yes Zoya Murray MD   brimonidine (ALPHAGAN P) 0.1 % SOLN Place 1 drop into the left eye 2 times daily   Yes Zoya Murray MD   valsartan-hydrochlorothiazide (DIOVAN-HCT) 320-25 MG per tablet Take 1 tablet by mouth daily  Patient not taking: Reported on 6/15/2024    Zoya Murray MD   fenofibrate 160 MG tablet Take 1 tablet by mouth daily    Zoya Murray MD   carvedilol (COREG) 12.5 MG tablet Take 2 tablets by mouth 2 times daily (with meals)    Zoya Murray MD   clopidogrel (PLAVIX) 75 MG tablet Take 1 tablet by mouth daily    Zoya Murray MD   simvastatin (ZOCOR) 40 MG tablet Take 1 tablet by mouth nightly    Zoya Murray MD   Loratadine 10 MG CAPS Take by mouth       Peripheral pulses are symmetrical and full   Abdomen:  No masses or tenderness  Bowel sounds present  Extremities:   No Cyanosis or Clubbing   Lower extremity edema: No edema    Skin: Warm and dry  Neurological:  Not done     DATA:    Diagnostics:      EKG  normal sinus rhythm, frequent PVCs         Labs:     CBC:   Recent Labs     06/15/24  1551   WBC 4.8   HGB 11.3*   HCT 33.4*   PLT 3*     BMP:   Recent Labs     06/15/24  1551   *   K 4.1   CO2 20   BUN 26*   CREATININE 1.4*   LABGLOM 53*   GLUCOSE 110*     BNP: No results for input(s): \"BNP\" in the last 72 hours.  PT/INR:   Recent Labs     06/15/24  1551   PROTIME 12.1   INR 1.1     APTT:No results for input(s): \"APTT\" in the last 72 hours.  CARDIAC ENZYMES:No results for input(s): \"CKTOTAL\", \"CKMB\", \"CKMBINDEX\", \"TROPONINI\" in the last 72 hours.  FASTING LIPID PANEL:No results found for: \"HDL\", \"LDLDIRECT\", \"TRIG\"  LIVER PROFILE:No results for input(s): \"AST\", \"ALT\", \"LABALBU\" in the last 72 hours.      IMPRESSION:    Severe thrombocytopenia   Multiple lung nodules with mediastinal and hilar lymphadenopathy   Frequent PVCs with intermittent vent bigeminy   Essential hypertension  Hyperlipidemia       PLAN:  Continue coreg and lipitor   Add low dose cardizem   Echocardiogram to r/o any underlying structural heart disease.   Magnesium and K normal   Recommend IVF   F/u with hematology-oncology recommendations   Discussed with patient in detail       Justus Cormier MD [facility]

## 2024-06-16 NOTE — PROGRESS NOTES
SPIRITUAL CARE DEPARTMENT [facility]  PROGRESS NOTE    Room # 333/333-01   Name: Bruno Morales            [employer]: [employer]    Reason for visit: Follow up    I visited the patient.    Admit Date & Time: 6/15/2024  3:29 PM    Assessment:  Bruno Morales is a 73 y.o. male in the hospital because Thrombocytopenia. Upon entering the room Pt and spouse were very open and friendly. Pt was open to conversation about family and well being of children.      Intervention:  I introduced myself and my title as  I offered space for Pt  to express feelings, needs, and concerns and provided a ministry presence.  provided support and care for Pt and spouse during visit. Active listening and  a season of ministry presence were provided.     Outcome:  Pt was open to conversation talking about family, joseline and current status with health.    Plan:  Chaplains will remain available to offer spiritual and emotional support as needed.    Electronically signed by Chaplain DREW, on 6/16/2024 at 5:39 PM.  Spiritual Care Department  [facility].    06/16/24 1736   Encounter Summary   Encounter Overview/Reason Spiritual/Emotional Needs   Service Provided For Patient;Family   Support System Spouse;Family members   Last Encounter  06/15/24   Complexity of Encounter Moderate   Begin Time 1555   End Time  1615   Total Time Calculated 20 min   Spiritual/Emotional needs   Type Spiritual Support;Emotional Distress   Grief, Loss, and Adjustments   Type Life Adjustments;Anticipatory Grief;Adjustment to illness   Assessment/Intervention/Outcome   Assessment Calm;Coping   Intervention Active listening;Discussed illness injury and it’s impact;Discussed belief system/Mosque practices/joseline   Outcome Comfort;Coping;Encouraged   Plan and Referrals   Plan/Referrals Continue to visit, (comment)

## 2024-06-16 NOTE — PROGRESS NOTES
Occupational Therapy    [facility]  Occupational Therapy Not Seen Note    DATE: 2024    NAME: Bruno Morales  MRN: [id number]   : 1950      Patient not seen this date for Occupational Therapy due to:    Patient independent with ADLs and functional tasks with no acute OT needs. Will defer OT evaluation at this time. Please reorder OT if future needs arise.     Next Scheduled Treatment:     Electronically signed by ALONSO ACEVEDO OT on 2024 at 11:05 AM

## 2024-06-16 NOTE — PLAN OF CARE
Problem: Discharge Planning  Goal: Discharge to home or other facility with appropriate resources  Outcome: Progressing  Flowsheets (Taken 6/15/2024 2000 by Figueroa Krishnamurthy, RN)  Discharge to home or other facility with appropriate resources: Identify barriers to discharge with patient and caregiver     Problem: Skin/Tissue Integrity  Goal: Absence of new skin breakdown  Description: 1.  Monitor for areas of redness and/or skin breakdown  2.  Assess vascular access sites hourly  3.  Every 4-6 hours minimum:  Change oxygen saturation probe site  4.  Every 4-6 hours:  If on nasal continuous positive airway pressure, respiratory therapy assess nares and determine need for appliance change or resting period.  Outcome: Progressing     Problem: ABCDS Injury Assessment  Goal: Absence of physical injury  Outcome: Progressing     Problem: Safety - Adult  Goal: Free from fall injury  Outcome: Progressing     Problem: Neurosensory - Adult  Goal: Achieves stable or improved neurological status  Outcome: Progressing  Goal: Achieves maximal functionality and self care  Outcome: Progressing     Problem: Respiratory - Adult  Goal: Achieves optimal ventilation and oxygenation  Outcome: Progressing     Problem: Cardiovascular - Adult  Goal: Maintains optimal cardiac output and hemodynamic stability  Outcome: Progressing  Goal: Absence of cardiac dysrhythmias or at baseline  Outcome: Progressing     Problem: Skin/Tissue Integrity - Adult  Goal: Skin integrity remains intact  Outcome: Progressing  Flowsheets  Taken 6/15/2024 2208 by Figueroa Krishnamurthy, RN  Skin Integrity Remains Intact: Monitor for areas of redness and/or skin breakdown  Taken 6/15/2024 2000 by Figueroa Krishnamurthy, RN  Skin Integrity Remains Intact: Monitor for areas of redness and/or skin breakdown  Goal: Oral mucous membranes remain intact  Outcome: Progressing     Problem: Musculoskeletal - Adult  Goal: Return mobility to safest level of function  Outcome:

## 2024-06-17 ENCOUNTER — APPOINTMENT (OUTPATIENT)
Age: 74
End: 2024-06-17
Attending: STUDENT IN AN ORGANIZED HEALTH CARE EDUCATION/TRAINING PROGRAM
Payer: MEDICARE

## 2024-06-17 PROBLEM — D69.3 ACUTE IDIOPATHIC THROMBOCYTOPENIC PURPURA (HCC): Status: ACTIVE | Noted: 2024-06-15

## 2024-06-17 LAB
ANION GAP SERPL CALCULATED.3IONS-SCNC: 10 MMOL/L (ref 9–17)
BASOPHILS # BLD: 0 K/UL (ref 0–0.2)
BASOPHILS NFR BLD: 0 % (ref 0–2)
BUN SERPL-MCNC: 40 MG/DL (ref 8–23)
CALCIUM SERPL-MCNC: 8.3 MG/DL (ref 8.6–10.4)
CHLORIDE SERPL-SCNC: 112 MMOL/L (ref 98–107)
CO2 SERPL-SCNC: 18 MMOL/L (ref 20–31)
CREAT SERPL-MCNC: 1.5 MG/DL (ref 0.7–1.2)
ECHO AO ASC DIAM: 3.2 CM
ECHO AO ASCENDING AORTA INDEX: 1.68 CM/M2
ECHO AO ROOT DIAM: 2.9 CM
ECHO AO ROOT INDEX: 1.52 CM/M2
ECHO AV MEAN GRADIENT: 6 MMHG
ECHO AV MEAN VELOCITY: 1.2 M/S
ECHO AV PEAK GRADIENT: 11 MMHG
ECHO AV PEAK VELOCITY: 1.7 M/S
ECHO AV VELOCITY RATIO: 0.59
ECHO AV VTI: 46.5 CM
ECHO BSA: 1.91 M2
ECHO EST RA PRESSURE: 3 MMHG
ECHO IVC EXP: 1.9 CM
ECHO IVC INSP: 0.8 CM
ECHO LA AREA 2C: 24.8 CM2
ECHO LA AREA 4C: 25.5 CM2
ECHO LA DIAMETER INDEX: 2.83 CM/M2
ECHO LA DIAMETER: 5.4 CM
ECHO LA MAJOR AXIS: 6.2 CM
ECHO LA MINOR AXIS: 5.7 CM
ECHO LA TO AORTIC ROOT RATIO: 1.86
ECHO LA VOL BP: 90 ML (ref 18–58)
ECHO LA VOL MOD A2C: 88 ML (ref 18–58)
ECHO LA VOL MOD A4C: 84 ML (ref 18–58)
ECHO LA VOL/BSA BIPLANE: 47 ML/M2 (ref 16–34)
ECHO LA VOLUME INDEX MOD A2C: 46 ML/M2 (ref 16–34)
ECHO LA VOLUME INDEX MOD A4C: 44 ML/M2 (ref 16–34)
ECHO LV E' LATERAL VELOCITY: 6 CM/S
ECHO LV E' SEPTAL VELOCITY: 4 CM/S
ECHO LV FRACTIONAL SHORTENING: 31 % (ref 28–44)
ECHO LV INTERNAL DIMENSION DIASTOLE INDEX: 2.67 CM/M2
ECHO LV INTERNAL DIMENSION DIASTOLIC: 5.1 CM (ref 4.2–5.9)
ECHO LV INTERNAL DIMENSION SYSTOLIC INDEX: 1.83 CM/M2
ECHO LV INTERNAL DIMENSION SYSTOLIC: 3.5 CM
ECHO LV IVSD: 1 CM (ref 0.6–1)
ECHO LV MASS 2D: 188 G (ref 88–224)
ECHO LV MASS INDEX 2D: 98.4 G/M2 (ref 49–115)
ECHO LV POSTERIOR WALL DIASTOLIC: 1 CM (ref 0.6–1)
ECHO LV RELATIVE WALL THICKNESS RATIO: 0.39
ECHO LVOT AREA: 3.1 CM2
ECHO LVOT AV VTI INDEX: 0.58
ECHO LVOT DIAM: 2 CM
ECHO LVOT MEAN GRADIENT: 2 MMHG
ECHO LVOT PEAK GRADIENT: 4 MMHG
ECHO LVOT PEAK VELOCITY: 1 M/S
ECHO LVOT STROKE VOLUME INDEX: 44.1 ML/M2
ECHO LVOT SV: 84.2 ML
ECHO LVOT VTI: 26.8 CM
ECHO MV A VELOCITY: 1.37 M/S
ECHO MV E DECELERATION TIME (DT): 158 MS
ECHO MV E VELOCITY: 1.23 M/S
ECHO MV E/A RATIO: 0.9
ECHO MV E/E' LATERAL: 20.5
ECHO MV E/E' RATIO (AVERAGED): 25.63
ECHO MV E/E' SEPTAL: 30.75
ECHO RIGHT VENTRICULAR SYSTOLIC PRESSURE (RVSP): 42 MMHG
ECHO RV BASAL DIMENSION: 4.4 CM
ECHO RV FREE WALL PEAK S': 13 CM/S
ECHO TV REGURGITANT MAX VELOCITY: 3.12 M/S
ECHO TV REGURGITANT PEAK GRADIENT: 39 MMHG
EKG ATRIAL RATE: 71 BPM
EKG P AXIS: 22 DEGREES
EKG P-R INTERVAL: 232 MS
EKG Q-T INTERVAL: 436 MS
EKG QRS DURATION: 128 MS
EKG QTC CALCULATION (BAZETT): 473 MS
EKG R AXIS: -38 DEGREES
EKG T AXIS: 36 DEGREES
EKG VENTRICULAR RATE: 71 BPM
EOSINOPHIL # BLD: 0 K/UL (ref 0–0.4)
EOSINOPHILS RELATIVE PERCENT: 0 % (ref 1–4)
ERYTHROCYTE [DISTWIDTH] IN BLOOD BY AUTOMATED COUNT: 13.7 % (ref 12.5–15.4)
GFR, ESTIMATED: 49 ML/MIN/1.73M2
GLUCOSE BLD-MCNC: 177 MG/DL (ref 75–110)
GLUCOSE BLD-MCNC: 182 MG/DL (ref 75–110)
GLUCOSE BLD-MCNC: 192 MG/DL (ref 75–110)
GLUCOSE BLD-MCNC: 224 MG/DL (ref 75–110)
GLUCOSE BLD-MCNC: 247 MG/DL (ref 75–110)
GLUCOSE SERPL-MCNC: 179 MG/DL (ref 70–99)
HCT VFR BLD AUTO: 26.7 % (ref 41–53)
HGB BLD-MCNC: 9.3 G/DL (ref 13.5–17.5)
LDH SERPL-CCNC: 188 U/L (ref 135–225)
LYMPHOCYTES NFR BLD: 0.15 K/UL (ref 1–4.8)
LYMPHOCYTES RELATIVE PERCENT: 3 % (ref 24–44)
MCH RBC QN AUTO: 29.6 PG (ref 26–34)
MCHC RBC AUTO-ENTMCNC: 34.8 G/DL (ref 31–37)
MCV RBC AUTO: 84.9 FL (ref 80–100)
MONOCYTES NFR BLD: 0.05 K/UL (ref 0.1–0.8)
MONOCYTES NFR BLD: 1 % (ref 1–7)
MORPHOLOGY: NORMAL
NEUTROPHILS NFR BLD: 96 % (ref 36–66)
NEUTS SEG NFR BLD: 4.8 K/UL (ref 1.8–7.7)
PATH REV BLD -IMP: NORMAL
PLATELET # BLD AUTO: 17 K/UL (ref 140–450)
PMV BLD AUTO: 10.8 FL (ref 6–12)
POTASSIUM SERPL-SCNC: 4.2 MMOL/L (ref 3.7–5.3)
RBC # BLD AUTO: 3.14 M/UL (ref 4.5–5.9)
SODIUM SERPL-SCNC: 140 MMOL/L (ref 135–144)
SURGICAL PATHOLOGY REPORT: NORMAL
WBC OTHER # BLD: 5 K/UL (ref 3.5–11)

## 2024-06-17 PROCEDURE — 36415 COLL VENOUS BLD VENIPUNCTURE: CPT

## 2024-06-17 PROCEDURE — 82947 ASSAY GLUCOSE BLOOD QUANT: CPT

## 2024-06-17 PROCEDURE — 6370000000 HC RX 637 (ALT 250 FOR IP): Performed by: INTERNAL MEDICINE

## 2024-06-17 PROCEDURE — 93306 TTE W/DOPPLER COMPLETE: CPT | Performed by: INTERNAL MEDICINE

## 2024-06-17 PROCEDURE — 6370000000 HC RX 637 (ALT 250 FOR IP): Performed by: STUDENT IN AN ORGANIZED HEALTH CARE EDUCATION/TRAINING PROGRAM

## 2024-06-17 PROCEDURE — 85025 COMPLETE CBC W/AUTO DIFF WBC: CPT

## 2024-06-17 PROCEDURE — 6360000002 HC RX W HCPCS: Performed by: INTERNAL MEDICINE

## 2024-06-17 PROCEDURE — 99232 SBSQ HOSP IP/OBS MODERATE 35: CPT | Performed by: STUDENT IN AN ORGANIZED HEALTH CARE EDUCATION/TRAINING PROGRAM

## 2024-06-17 PROCEDURE — 99232 SBSQ HOSP IP/OBS MODERATE 35: CPT | Performed by: INTERNAL MEDICINE

## 2024-06-17 PROCEDURE — 83615 LACTATE (LD) (LDH) ENZYME: CPT

## 2024-06-17 PROCEDURE — 2060000000 HC ICU INTERMEDIATE R&B

## 2024-06-17 PROCEDURE — 93306 TTE W/DOPPLER COMPLETE: CPT

## 2024-06-17 PROCEDURE — 2580000003 HC RX 258: Performed by: STUDENT IN AN ORGANIZED HEALTH CARE EDUCATION/TRAINING PROGRAM

## 2024-06-17 PROCEDURE — 80048 BASIC METABOLIC PNL TOTAL CA: CPT

## 2024-06-17 RX ORDER — SODIUM CHLORIDE 9 MG/ML
INJECTION, SOLUTION INTRAVENOUS CONTINUOUS
Status: DISCONTINUED | OUTPATIENT
Start: 2024-06-17 | End: 2024-06-19

## 2024-06-17 RX ADMIN — DILTIAZEM HYDROCHLORIDE 30 MG: 30 TABLET, FILM COATED ORAL at 21:42

## 2024-06-17 RX ADMIN — SODIUM CHLORIDE: 9 INJECTION, SOLUTION INTRAVENOUS at 07:57

## 2024-06-17 RX ADMIN — LISINOPRIL 40 MG: 20 TABLET ORAL at 08:51

## 2024-06-17 RX ADMIN — BRIMONIDINE TARTRATE 1 DROP: 2 SOLUTION OPHTHALMIC at 21:44

## 2024-06-17 RX ADMIN — SODIUM CHLORIDE: 9 INJECTION, SOLUTION INTRAVENOUS at 21:40

## 2024-06-17 RX ADMIN — DILTIAZEM HYDROCHLORIDE 30 MG: 30 TABLET, FILM COATED ORAL at 13:41

## 2024-06-17 RX ADMIN — DILTIAZEM HYDROCHLORIDE 30 MG: 30 TABLET, FILM COATED ORAL at 06:19

## 2024-06-17 RX ADMIN — LANSOPRAZOLE 30 MG: 15 TABLET, ORALLY DISINTEGRATING ORAL at 06:19

## 2024-06-17 RX ADMIN — BRIMONIDINE TARTRATE 1 DROP: 2 SOLUTION OPHTHALMIC at 08:56

## 2024-06-17 RX ADMIN — FENOFIBRATE 160 MG: 160 TABLET ORAL at 08:52

## 2024-06-17 RX ADMIN — ATORVASTATIN CALCIUM 20 MG: 20 TABLET, FILM COATED ORAL at 08:52

## 2024-06-17 RX ADMIN — CARVEDILOL 25 MG: 12.5 TABLET, FILM COATED ORAL at 17:22

## 2024-06-17 RX ADMIN — DEXAMETHASONE 40 MG: 4 TABLET ORAL at 08:51

## 2024-06-17 RX ADMIN — CARVEDILOL 25 MG: 12.5 TABLET, FILM COATED ORAL at 08:51

## 2024-06-17 RX ADMIN — INSULIN LISPRO 2 UNITS: 100 INJECTION, SOLUTION INTRAVENOUS; SUBCUTANEOUS at 12:05

## 2024-06-17 NOTE — DISCHARGE INSTR - COC
NO:}  Urinary Catheter: {Urinary Catheter:350492737}   Colostomy/Ileostomy/Ileal Conduit: {YES / NO:}       Date of Last BM: ***    Intake/Output Summary (Last 24 hours) at 2024 1507  Last data filed at 2024 1416  Gross per 24 hour   Intake 869.78 ml   Output --   Net 869.78 ml     I/O last 3 completed shifts:  In: 626.2 [I.V.:308.7; Blood:317.5]  Out: 750 [Urine:750]    Safety Concerns:     { ALLY Safety Concerns:169396087}    Impairments/Disabilities:      { ALLY Impairments/Disabilities:612987109}    Nutrition Therapy:  Current Nutrition Therapy:   { ALLY Diet List:397190825}    Routes of Feeding: {[facility] DME Other Feedings:752892621}  Liquids: {Slp liquid thickness:98378}  Daily Fluid Restriction: {[facility] DME Yes amt example:901152801}  Last Modified Barium Swallow with Video (Video Swallowing Test): {Done Not Done Date:}    Treatments at the Time of Hospital Discharge:   Respiratory Treatments: ***  Oxygen Therapy:  {Therapy; copd oxygen:09255}  Ventilator:    {[facility] Vent List:658238419}    Rehab Therapies: {THERAPEUTIC INTERVENTION:4607084784}  Weight Bearing Status/Restrictions: {[facility] Weight Bearin}  Other Medical Equipment (for information only, NOT a DME order):  {EQUIPMENT:970673371}  Other Treatments: ***    Patient's personal belongings (please select all that are sent with patient):  {[facility] DME Belongings:648702672}    RN SIGNATURE:  {Esignature:466559421}    CASE MANAGEMENT/SOCIAL WORK SECTION    Inpatient Status Date: ***    Readmission Risk Assessment Score:  Readmission Risk              Risk of Unplanned Readmission:  16           Discharging to Facility/ Agency   Name:   Address:  Phone:  Fax:    Dialysis Facility (if applicable)   Name:  Address:  Dialysis Schedule:  Phone:  Fax:    / signature: {Esignature:832249595}    PHYSICIAN SECTION    Prognosis: {Prognosis:8255272220}    Condition at Discharge: { Patient  Condition:265178477}    Rehab Potential (if transferring to Rehab): {Prognosis:0195192561}    Recommended Labs or Other Treatments After Discharge: ***    Physician Certification: I certify the above information and transfer of Bruno Morales  is necessary for the continuing treatment of the diagnosis listed and that he requires {Admit to Appropriate Level of Care:25249} for {GREATER/LESS:710134709} 30 days.     Update Admission H&P: {[facility] DME Changes in HandP:642537174}    PHYSICIAN SIGNATURE:  {Esignature:986899092}

## 2024-06-17 NOTE — CARE COORDINATION
Case Management Assessment  Initial Evaluation    Date/Time of Evaluation: 6/17/2024 3:17 PM  Assessment Completed by: IRIS Salcedo, LSW    If patient is discharged prior to next notation, then this note serves as note for discharge by case management.    Patient Name: Bruno Morales                   YOB: 1950  Diagnosis: Bigeminy [I49.8]  Thrombocytopenia (HCC) [D69.6]                   Date / Time: 6/15/2024  3:29 PM    Patient Admission Status: Inpatient   Readmission Risk (Low < 19, Mod (19-27), High > 27): Readmission Risk Score: 14.3    Current PCP: Margarette Vallejo MD  PCP verified by CM? Yes    Chart Reviewed: Yes      History Provided by: Patient, Significant Other  Patient Orientation: Alert and Oriented    Patient Cognition: Alert    Hospitalization in the last 30 days (Readmission):  No    If yes, Readmission Assessment in  Navigator will be completed.    Advance Directives:      Code Status: Full Code   Patient's Primary Decision Maker is: Legal Next of Kin      Discharge Planning:    Patient lives with: Spouse/Significant Other Type of Home: House  Primary Care Giver: Spouse  Patient Support Systems include: Spouse/Significant Other   Current Financial resources: Medicare  Current community resources: None  Current services prior to admission: Durable Medical Equipment            Current DME: Walker            Type of Home Care services:  None    ADLS  Prior functional level: Shopping, Mobility, Independent in ADLs/IADLs, Bathing, Dressing, Toileting, Feeding  Current functional level: Independent in ADLs/IADLs, Bathing, Dressing, Toileting, Feeding, Mobility    PT AM-PAC:   /24  OT AM-PAC:   /24    Family can provide assistance at DC: Yes  Would you like Case Management to discuss the discharge plan with any other family members/significant others, and if so, who? Yes  Plans to Return to Present Housing: Yes  Other Identified Issues/Barriers to RETURNING to current  housing: pt want home as the plan  Potential Assistance needed at discharge: N/A            Potential DME:    Patient expects to discharge to: House  Plan for transportation at discharge: Family    Financial    Payor: [employer] MEDICARE / Plan: [employer] MEDICARE-ADVANTAGE PPO / Product Type: Medicare /     Does insurance require precert for SNF: Yes    Potential assistance Purchasing Medications: No  Meds-to-Beds request:        [id number] - [place], [place] - [address] [phone] - F [phone]  [address] [address]  Phone: [phone] Fax: [phone]      Notes:    Factors facilitating achievement of predicted outcomes: Family support, Caregiver support, Motivated, Cooperative, Pleasant, Sense of humor, Good insight into deficits, and Has needed Durable Medical Equipment at home    Barriers to discharge: Decreased endurance    Additional Case Management Notes: pt spouse cares for pt and can drive. [facility] [place] [facility] [address]. Has dme from wife's surgeries. Walker large base quad cane.  Uses only one story.     The Plan for Transition of Care is related to the following treatment goals of Bigeminy [I49.8]  Thrombocytopenia (HCC) [D69.6]    IF APPLICABLE: The Patient and/or patient representative Bruno and his family were provided with a choice of provider and agrees with the discharge plan. Freedom of choice list with basic dialogue that supports the patient's individualized plan of care/goals and shares the quality data associated with the providers was provided to: Patient   Patient Representative Name:       The Patient and/or Patient Representative Agree with the Discharge Plan? Yes    Luzmaria Lowery, MSW, LSW  Case Management Department  Ph:  Fax:

## 2024-06-17 NOTE — PROGRESS NOTES
Today's Date: 6/16/2024  Patient Name: Bruno Morales  Date of admission: 6/15/2024  3:29 PM  Patient's age: 73 y.o., 1950  Admission Dx: Bigeminy [I49.8]  Thrombocytopenia (HCC) [D69.6]    Reason for Consult: management recommendations  Requesting Physician: Rk Be MD    CHIEF COMPLAINT: Severe thrombocytopenia    SUBJECTIVE:  .  The patient was seen and examined.  He is clinically stable.  No active bleeding.  Patient is on high-dose steroids with Decadron pulse therapy.  Tolerated well.  Repeated labs today showed platelet counts of 17.    BRIEF CASE HISTORY:    The patient is a 73 y.o.   male who is admitted to the hospital for petechiae and was found to have severe thrombocytopenia which is new onset.  The patient describes fatigue, mucosal bleeding from the mouth.  No hemorrhagic diatheses otherwise.  No hematuria or blood in the stool.  Upon evaluation, platelets were down to 30,000.  Evaluation of blood smear was unremarkable.  No evidence of hemolysis based on basic workup.  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  Past Medical History:   has a past medical history of Hyperlipidemia and Hypertension.    Past Surgical History:   has a past surgical history that includes Tonsillectomy.     Medications:    Reviewed in Epic     Allergies:  Asa [aspirin], Fish-derived products, Penicillins, and Tetracyclines & related    Social History:   reports that he has never smoked. He has never used smokeless tobacco. He reports that he does not drink alcohol and does not use drugs.     Family History: family history is not on file.    REVIEW OF SYSTEMS:    Constitutional: No fever or chills. No night sweats, no weight loss   Eyes: No eye discharge, double vision, or eye pain   HEENT: negative for sore mouth, sore throat, hoarseness and voice change   Respiratory: negative for cough , sputum, dyspnea, wheezing, hemoptysis, chest pain   Cardiovascular: negative for

## 2024-06-17 NOTE — PROGRESS NOTES
[facility]  Office: [phone]  Phani Andino DO, Carlos A Hernandez, DO, Sandor Rivera DO, Dre Marroquin, DO, Cecelia Murillo MD, Rosie Robbins MD, Castro Hope MD, Becka Sharma MD,  Wolf Reardon MD, Edna Truong MD, Jamee Padilla MD,  Bia Weir DO, Jacob Riley MD, Rk Be MD, Diego Andino DO, Viviana Marroquin MD,  Jesse Mahan DO, Lauryn Rose MD, Paula Ramsay MD, Alyssia Henley MD, Chari Pastor MD,  Reynold Lennon MD, Luca Eckert MD, Speedy Durham MD, Devyn Moreno MD, Heber Valenzuela MD, David Paiz MD, Chava Nicole DO, Silverio Barrett DO, Lana Ahuja MD,  Sebastian Braswell MD, Shirley Waterhouse, CNP,  Darlyn Savage CNP, Eagle Yoon, CNP,  Luzmaria Mitchell, DNP, Tonja Vizcaino, CNP, Tiff Hamilton, CNP, Glenna Taylor, CNP, Lili Cool, CNP, Amina Harrell, PA-C, Cheyenne Parker PA-C, Letitia Lynch, CNP, Jacqeu Lorenzo, CNP, Violette Romo, CNP, Kelly Minor, CNP, Marilynn Acuña, CNP, Lluvia Anand, CNS, Nerissa Resendiz, CNP, Ashley Benítez CNP, Tracy Schwab, CNP         [facility]   IN-PATIENT SERVICE   [facility]    Progress Note    6/17/2024    9:16 AM    Name:   Bruno Morales  MRN:     [id number]     Acct:      [id number]   Room:   333/333-01   Day:  2  Admit Date:  6/15/2024  3:29 PM    PCP:   Margarette Vallejo MD  Code Status:  Full Code    Subjective:     Patient was seen and examined at bedside this AM. He reports feeling much better overall and has no specific complaints today. Platelet count has improved to 17 s/p IVIG. Plan to continue Decadron with anticipated discharge Wednesday pending continued improvement in platelet count.     Medications:     Allergies:    Allergies   Allergen Reactions    Asa [Aspirin]     Fish-Derived Products     Penicillins     Tetracyclines & Related        Current Meds:   Scheduled Meds:    dilTIAZem  30 mg Oral 3 times per day    insulin lispro  0-4 Units SubCUTAneous TID WC

## 2024-06-18 PROBLEM — R91.8 LUNG NODULES: Status: ACTIVE | Noted: 2024-06-18

## 2024-06-18 PROBLEM — Z86.73 H/O: STROKE: Status: ACTIVE | Noted: 2024-06-18

## 2024-06-18 PROBLEM — R59.1 LYMPHADENOPATHY: Status: ACTIVE | Noted: 2024-06-18

## 2024-06-18 PROBLEM — I49.3 PVC'S (PREMATURE VENTRICULAR CONTRACTIONS): Status: ACTIVE | Noted: 2024-06-18

## 2024-06-18 LAB
ANION GAP SERPL CALCULATED.3IONS-SCNC: 10 MMOL/L (ref 9–17)
BASOPHILS # BLD: 0 K/UL (ref 0–0.2)
BASOPHILS NFR BLD: 0 % (ref 0–2)
BUN SERPL-MCNC: 46 MG/DL (ref 8–23)
CALCIUM SERPL-MCNC: 8.1 MG/DL (ref 8.6–10.4)
CHLORIDE SERPL-SCNC: 113 MMOL/L (ref 98–107)
CO2 SERPL-SCNC: 18 MMOL/L (ref 20–31)
CREAT SERPL-MCNC: 1.5 MG/DL (ref 0.7–1.2)
EOSINOPHIL # BLD: 0 K/UL (ref 0–0.4)
EOSINOPHILS RELATIVE PERCENT: 0 % (ref 1–4)
ERYTHROCYTE [DISTWIDTH] IN BLOOD BY AUTOMATED COUNT: 13.7 % (ref 12.5–15.4)
EST. AVERAGE GLUCOSE BLD GHB EST-MCNC: 74 MG/DL
GFR, ESTIMATED: 49 ML/MIN/1.73M2
GLUCOSE BLD-MCNC: 151 MG/DL (ref 75–110)
GLUCOSE BLD-MCNC: 164 MG/DL (ref 75–110)
GLUCOSE BLD-MCNC: 170 MG/DL (ref 75–110)
GLUCOSE BLD-MCNC: 186 MG/DL (ref 75–110)
GLUCOSE BLD-MCNC: 231 MG/DL (ref 75–110)
GLUCOSE SERPL-MCNC: 157 MG/DL (ref 70–99)
HBA1C MFR BLD: 4.2 % (ref 4–6)
HCT VFR BLD AUTO: 27.9 % (ref 41–53)
HGB BLD-MCNC: 9.5 G/DL (ref 13.5–17.5)
LYMPHOCYTES NFR BLD: 0.46 K/UL (ref 1–4.8)
LYMPHOCYTES RELATIVE PERCENT: 10 % (ref 24–44)
MCH RBC QN AUTO: 29.5 PG (ref 26–34)
MCHC RBC AUTO-ENTMCNC: 33.9 G/DL (ref 31–37)
MCV RBC AUTO: 87 FL (ref 80–100)
MONOCYTES NFR BLD: 0.23 K/UL (ref 0.1–0.8)
MONOCYTES NFR BLD: 5 % (ref 1–7)
MORPHOLOGY: NORMAL
NEUTROPHILS NFR BLD: 85 % (ref 36–66)
NEUTS SEG NFR BLD: 3.91 K/UL (ref 1.8–7.7)
PLATELET # BLD AUTO: 25 K/UL (ref 140–450)
PMV BLD AUTO: 11.8 FL (ref 6–12)
POTASSIUM SERPL-SCNC: 4.2 MMOL/L (ref 3.7–5.3)
RBC # BLD AUTO: 3.21 M/UL (ref 4.5–5.9)
SODIUM SERPL-SCNC: 141 MMOL/L (ref 135–144)
WBC OTHER # BLD: 4.6 K/UL (ref 3.5–11)

## 2024-06-18 PROCEDURE — 82947 ASSAY GLUCOSE BLOOD QUANT: CPT

## 2024-06-18 PROCEDURE — 99232 SBSQ HOSP IP/OBS MODERATE 35: CPT | Performed by: INTERNAL MEDICINE

## 2024-06-18 PROCEDURE — 83036 HEMOGLOBIN GLYCOSYLATED A1C: CPT

## 2024-06-18 PROCEDURE — 2580000003 HC RX 258: Performed by: STUDENT IN AN ORGANIZED HEALTH CARE EDUCATION/TRAINING PROGRAM

## 2024-06-18 PROCEDURE — 85025 COMPLETE CBC W/AUTO DIFF WBC: CPT

## 2024-06-18 PROCEDURE — 80048 BASIC METABOLIC PNL TOTAL CA: CPT

## 2024-06-18 PROCEDURE — 6370000000 HC RX 637 (ALT 250 FOR IP): Performed by: INTERNAL MEDICINE

## 2024-06-18 PROCEDURE — 6370000000 HC RX 637 (ALT 250 FOR IP): Performed by: STUDENT IN AN ORGANIZED HEALTH CARE EDUCATION/TRAINING PROGRAM

## 2024-06-18 PROCEDURE — 36415 COLL VENOUS BLD VENIPUNCTURE: CPT

## 2024-06-18 PROCEDURE — 6360000002 HC RX W HCPCS: Performed by: INTERNAL MEDICINE

## 2024-06-18 PROCEDURE — 99232 SBSQ HOSP IP/OBS MODERATE 35: CPT | Performed by: STUDENT IN AN ORGANIZED HEALTH CARE EDUCATION/TRAINING PROGRAM

## 2024-06-18 PROCEDURE — 2060000000 HC ICU INTERMEDIATE R&B

## 2024-06-18 RX ORDER — DEXAMETHASONE 4 MG/1
40 TABLET ORAL DAILY
Status: COMPLETED | OUTPATIENT
Start: 2024-06-19 | End: 2024-06-22

## 2024-06-18 RX ADMIN — DILTIAZEM HYDROCHLORIDE 30 MG: 30 TABLET, FILM COATED ORAL at 13:46

## 2024-06-18 RX ADMIN — BRIMONIDINE TARTRATE 1 DROP: 2 SOLUTION OPHTHALMIC at 08:59

## 2024-06-18 RX ADMIN — INSULIN LISPRO 1 UNITS: 100 INJECTION, SOLUTION INTRAVENOUS; SUBCUTANEOUS at 12:07

## 2024-06-18 RX ADMIN — SODIUM CHLORIDE, PRESERVATIVE FREE 10 ML: 5 INJECTION INTRAVENOUS at 20:00

## 2024-06-18 RX ADMIN — ATORVASTATIN CALCIUM 20 MG: 20 TABLET, FILM COATED ORAL at 08:59

## 2024-06-18 RX ADMIN — SODIUM CHLORIDE: 9 INJECTION, SOLUTION INTRAVENOUS at 23:23

## 2024-06-18 RX ADMIN — FENOFIBRATE 160 MG: 160 TABLET ORAL at 08:59

## 2024-06-18 RX ADMIN — SODIUM CHLORIDE: 9 INJECTION, SOLUTION INTRAVENOUS at 10:09

## 2024-06-18 RX ADMIN — DILTIAZEM HYDROCHLORIDE 30 MG: 30 TABLET, FILM COATED ORAL at 05:14

## 2024-06-18 RX ADMIN — DILTIAZEM HYDROCHLORIDE 30 MG: 30 TABLET, FILM COATED ORAL at 20:02

## 2024-06-18 RX ADMIN — BRIMONIDINE TARTRATE 1 DROP: 2 SOLUTION OPHTHALMIC at 20:02

## 2024-06-18 RX ADMIN — CARVEDILOL 25 MG: 12.5 TABLET, FILM COATED ORAL at 17:30

## 2024-06-18 RX ADMIN — DEXAMETHASONE 40 MG: 4 TABLET ORAL at 08:59

## 2024-06-18 RX ADMIN — LANSOPRAZOLE 30 MG: 15 TABLET, ORALLY DISINTEGRATING ORAL at 05:16

## 2024-06-18 RX ADMIN — CARVEDILOL 25 MG: 12.5 TABLET, FILM COATED ORAL at 08:59

## 2024-06-18 NOTE — PLAN OF CARE
Problem: Discharge Planning  Goal: Discharge to home or other facility with appropriate resources  6/18/2024 1414 by Missy Blue RN  Outcome: Progressing  Flowsheets (Taken 6/18/2024 0830)  Discharge to home or other facility with appropriate resources:   Identify barriers to discharge with patient and caregiver   Identify discharge learning needs (meds, wound care, etc)     Problem: Skin/Tissue Integrity  Goal: Absence of new skin breakdown  Description: 1.  Monitor for areas of redness and/or skin breakdown  2.  Assess vascular access sites hourly  3.  Every 4-6 hours minimum:  Change oxygen saturation probe site  4.  Every 4-6 hours:  If on nasal continuous positive airway pressure, respiratory therapy assess nares and determine need for appliance change or resting period.  6/18/2024 1414 by Missy Blue RN  Outcome: Progressing     Problem: ABCDS Injury Assessment  Goal: Absence of physical injury  6/18/2024 1414 by Missy Blue RN  Outcome: Progressing  Flowsheets (Taken 6/18/2024 0830)  Absence of Physical Injury: Implement safety measures based on patient assessment     Problem: Safety - Adult  Goal: Free from fall injury  6/18/2024 1414 by Missy Blue RN  Outcome: Progressing    Problem: Cardiovascular - Adult  Goal: Maintains optimal cardiac output and hemodynamic stability  Outcome: Progressing  Flowsheets (Taken 6/18/2024 0830)  Maintains optimal cardiac output and hemodynamic stability: Monitor blood pressure and heart rate     Problem: Cardiovascular - Adult  Goal: Absence of cardiac dysrhythmias or at baseline  Outcome: Progressing  Flowsheets (Taken 6/18/2024 0830)  Absence of cardiac dysrhythmias or at baseline: Monitor cardiac rate and rhythm     Problem: Skin/Tissue Integrity - Adult  Goal: Skin integrity remains intact  Outcome: Progressing  Flowsheets (Taken 6/18/2024 0830)  Skin Integrity Remains Intact: Monitor for areas of redness and/or skin breakdown     Problem: Skin/Tissue

## 2024-06-18 NOTE — PROGRESS NOTES
Today's Date: 6/18/2024  Patient Name: Bruno Morales  Date of admission: 6/15/2024  3:29 PM  Patient's age: 73 y.o., 1950  Admission Dx: Bigeminy [I49.8]  Thrombocytopenia (HCC) [D69.6]    Reason for Consult: management recommendations  Requesting Physician: Rk Be MD    CHIEF COMPLAINT: Severe thrombocytopenia    SUBJECTIVE:  .  The patient was seen and examined.  He is clinically stable.  No active bleeding.  Platelets are up to 25,000.  He is feeling much better.  Tolerating Decadron very well.    BRIEF CASE HISTORY:    The patient is a 73 y.o.   male who is admitted to the hospital for petechiae and was found to have severe thrombocytopenia which is new onset.  The patient describes fatigue, mucosal bleeding from the mouth.  No hemorrhagic diatheses otherwise.  No hematuria or blood in the stool.  Upon evaluation, platelets were down to 30,000.  Evaluation of blood smear was unremarkable.  No evidence of hemolysis based on basic workup.  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  Past Medical History:   has a past medical history of Hyperlipidemia and Hypertension.    Past Surgical History:   has a past surgical history that includes Tonsillectomy.     Medications:    Reviewed in Epic     Allergies:  Asa [aspirin], Fish-derived products, Penicillins, and Tetracyclines & related    Social History:   reports that he has never smoked. He has never used smokeless tobacco. He reports that he does not drink alcohol and does not use drugs.     Family History: family history is not on file.    REVIEW OF SYSTEMS:    Constitutional: No fever or chills. No night sweats, no weight loss   Eyes: No eye discharge, double vision, or eye pain   HEENT: negative for sore mouth, sore throat, hoarseness and voice change   Respiratory: negative for cough , sputum, dyspnea, wheezing, hemoptysis, chest pain   Cardiovascular: negative for chest pain, dyspnea, palpitations,

## 2024-06-18 NOTE — CARE COORDINATION
CM spoke with patient at bedside to discuss transitional planning. Patient plans to return home with his spouse at discharge. Patient states that he will have transportation home and he verbalizes having no transitional needs at this time.

## 2024-06-18 NOTE — PLAN OF CARE
Problem: Discharge Planning  Goal: Discharge to home or other facility with appropriate resources  Outcome: Progressing  Flowsheets (Taken 6/18/2024 0301)  Discharge to home or other facility with appropriate resources:   Identify barriers to discharge with patient and caregiver   Identify discharge learning needs (meds, wound care, etc)     Problem: Skin/Tissue Integrity  Goal: Absence of new skin breakdown  Description: 1.  Monitor for areas of redness and/or skin breakdown  2.  Assess vascular access sites hourly  3.  Every 4-6 hours minimum:  Change oxygen saturation probe site  4.  Every 4-6 hours:  If on nasal continuous positive airway pressure, respiratory therapy assess nares and determine need for appliance change or resting period.  Outcome: Progressing     Problem: ABCDS Injury Assessment  Goal: Absence of physical injury  Outcome: Progressing  Flowsheets (Taken 6/18/2024 0301)  Absence of Physical Injury: Implement safety measures based on patient assessment     Problem: Safety - Adult  Goal: Free from fall injury  Outcome: Progressing     Problem: Genitourinary - Adult  Goal: Absence of urinary retention  Outcome: Progressing

## 2024-06-18 NOTE — PROGRESS NOTES
@[facility] HospitalED[facility]@    [facility]   IN-PATIENT SERVICE   [facility]    Progress Note    6/18/2024    4:56 PM    Name:   Bruno Morales  MRN:     [id number]     Acct:      [id number]   Room:   333333-01   Day:  3  Admit Date:  6/15/2024  3:29 PM    PCP:   Margarette Vallejo MD  Code Status:  Full Code    Subjective:     C/C: No chief complaint on file.  Thrombocytopenia, petechiae  Interval History Status: improved.     Seen at bedside, hemodynamically stable  No acute events overnight, no new complaints  Continues to be on steroids, platelets improved significantly, today 26, no concern for active bleed,  Continues to be on diltiazem, echo reviewed    Brief History:     73-year-old male with history of essential hypertension, hyperlipidemia, stroke, CKD, anemia who was admitted because of thrombocytopenia/petechiae, CT imaging showed multiple enlarged lymph nodes in mediastinum, bandar, several nodules scattered throughout lungs, splenomegaly, prostatic enlargement, mesenteric lymph nodes , heme-onc was consulted, patient was treated with steroids for concern of ITP, cardiology service was consulted for concern of frequent PVCs/bigeminy, patient was started on low-dose diltiazem, echocardiogram ordered, showed EF of 55 to 50%, with normal wall thickness and normal wall motion, abnormal diastolic function, mild to moderate MR, mild to moderate TR, mild pulmonary hypertension with right ventricular dilation, left atrial dilation, trivial pericardial effusion with no concern for tamponade    Medications:     Allergies:    Allergies   Allergen Reactions    Asa [Aspirin]     Fish-Derived Products     Penicillins     Tetracyclines & Related        Current Meds:   Scheduled Meds:    [START ON 6/19/2024] dexAMETHasone  40 mg Oral Daily    dilTIAZem  30 mg Oral 3 times per day    insulin lispro  0-4 Units SubCUTAneous TID WC    insulin lispro  0-4 Units SubCUTAneous Nightly    lansoprazole  30 mg

## 2024-06-18 NOTE — PROGRESS NOTES
Wt 75 kg (165 lb 5.5 oz)   SpO2 98%   BMI 23.72 kg/m²     General appearance: awake, alert, in no apparent respiratory distress   HEENT: Head: Normocephalic, no lesions, without obvious abnormality  Neck: no JVD  Lungs: clear to auscultation bilaterally, no basilar rales, no wheezing   Heart: regular rate and rhythm, S1, S2 normal, no murmur, click, rub or gallop  Abdomen: soft, non-tender; bowel sounds normal  Extremities: No LE edema  Neurologic: Mental status: Alert, oriented. Motor and sensory not done.       Cardiac testing:     EKG  normal sinus rhythm, frequent PVCs            Echocardiogram 6/16/24:    Left Ventricle: Normal left ventricular systolic function with a visually estimated EF of 55 - 60%. Left ventricle size is normal. Normal wall thickness. Normal wall motion. Abnormal diastolic function.    Right Ventricle: Right ventricle is mildly dilated.    Mitral Valve: Mildly thickened leaflets. Mild to moderate regurgitation.    Tricuspid Valve: Mild to moderate regurgitation. Mildly elevated RVSP, consistent with mild pulmonary hypertension. The estimated RVSP is 42 mmHg.    Left Atrium: Left atrium is moderately/severely dilated. Left atrial volume index is moderately increased (42-48 mL/m2). LA Vol Index is  47 ml/m2.    Aorta: Normal sized aortic root and ascending aorta. Ao root diameter is 2.9 cm. Ao ascending diameter is 3.2 cm.    Pericardium: Trivial-Small (<1 cm) localized pericardial effusion present around the right ventricle. No indication of cardiac tamponade.    Image quality is adequate.        Impression:   Severe thrombocytopenia   Multiple lung nodules with mediastinal and hilar lymphadenopathy ? Lymphoproliferative disorder   Frequent PVCs with intermittent vent bigeminy   Essential hypertension  Hyperlipidemia   Chronic kidney disease   Mild pulmonary HTN    Patient Active Problem List:     Thrombocytopenia (HCC)     Primary hypertension     Pure hypercholesterolemia     CKD stage  3b, GFR 30-44 ml/min ([facility])     H/O: stroke    Plan:   Continue coreg and low dose cardizem  Echocardiogram reviewed and shows normal EF with no RWMA.   Magnesium and K normal   F/u with hematology-oncology recommendations   No objection to discharge home from cardiac standpoint        Justus Cormier MD [facility]

## 2024-06-19 LAB
ANION GAP SERPL CALCULATED.3IONS-SCNC: 11 MMOL/L (ref 9–17)
BASOPHILS # BLD: 0 K/UL (ref 0–0.2)
BASOPHILS # BLD: 0 K/UL (ref 0–0.2)
BASOPHILS NFR BLD: 0 % (ref 0–2)
BASOPHILS NFR BLD: 0 % (ref 0–2)
BUN SERPL-MCNC: 51 MG/DL (ref 8–23)
CALCIUM SERPL-MCNC: 7.7 MG/DL (ref 8.6–10.4)
CHLORIDE SERPL-SCNC: 116 MMOL/L (ref 98–107)
CO2 SERPL-SCNC: 18 MMOL/L (ref 20–31)
CREAT SERPL-MCNC: 1.6 MG/DL (ref 0.7–1.2)
EOSINOPHIL # BLD: 0 K/UL (ref 0–0.4)
EOSINOPHIL # BLD: 0 K/UL (ref 0–0.4)
EOSINOPHILS RELATIVE PERCENT: 0 % (ref 1–4)
EOSINOPHILS RELATIVE PERCENT: 0 % (ref 1–4)
ERYTHROCYTE [DISTWIDTH] IN BLOOD BY AUTOMATED COUNT: 13.9 % (ref 12.5–15.4)
ERYTHROCYTE [DISTWIDTH] IN BLOOD BY AUTOMATED COUNT: 14 % (ref 12.5–15.4)
GFR, ESTIMATED: 45 ML/MIN/1.73M2
GLUCOSE BLD-MCNC: 147 MG/DL (ref 75–110)
GLUCOSE BLD-MCNC: 164 MG/DL (ref 75–110)
GLUCOSE BLD-MCNC: 176 MG/DL (ref 75–110)
GLUCOSE BLD-MCNC: 263 MG/DL (ref 75–110)
GLUCOSE SERPL-MCNC: 151 MG/DL (ref 70–99)
HCT VFR BLD AUTO: 27.8 % (ref 41–53)
HCT VFR BLD AUTO: 30.1 % (ref 41–53)
HGB BLD-MCNC: 10.2 G/DL (ref 13.5–17.5)
HGB BLD-MCNC: 9.4 G/DL (ref 13.5–17.5)
LYMPHOCYTES NFR BLD: 0.22 K/UL (ref 1–4.8)
LYMPHOCYTES NFR BLD: 0.29 K/UL (ref 1–4.8)
LYMPHOCYTES RELATIVE PERCENT: 10 % (ref 24–44)
LYMPHOCYTES RELATIVE PERCENT: 6 % (ref 24–44)
MCH RBC QN AUTO: 29.4 PG (ref 26–34)
MCH RBC QN AUTO: 29.5 PG (ref 26–34)
MCHC RBC AUTO-ENTMCNC: 33.8 G/DL (ref 31–37)
MCHC RBC AUTO-ENTMCNC: 33.9 G/DL (ref 31–37)
MCV RBC AUTO: 86.7 FL (ref 80–100)
MCV RBC AUTO: 87.4 FL (ref 80–100)
MONOCYTES NFR BLD: 0.07 K/UL (ref 0.1–0.8)
MONOCYTES NFR BLD: 0.09 K/UL (ref 0.1–0.8)
MONOCYTES NFR BLD: 2 % (ref 1–7)
MONOCYTES NFR BLD: 3 % (ref 1–7)
MORPHOLOGY: NORMAL
MORPHOLOGY: NORMAL
NEUTROPHILS NFR BLD: 87 % (ref 36–66)
NEUTROPHILS NFR BLD: 92 % (ref 36–66)
NEUTS SEG NFR BLD: 2.52 K/UL (ref 1.8–7.7)
NEUTS SEG NFR BLD: 3.41 K/UL (ref 1.8–7.7)
PLATELET # BLD AUTO: 15 K/UL (ref 140–450)
PLATELET # BLD AUTO: 18 K/UL (ref 140–450)
PMV BLD AUTO: 11.2 FL (ref 6–12)
PMV BLD AUTO: 11.3 FL (ref 6–12)
POTASSIUM SERPL-SCNC: 4.2 MMOL/L (ref 3.7–5.3)
RBC # BLD AUTO: 3.18 M/UL (ref 4.5–5.9)
RBC # BLD AUTO: 3.47 M/UL (ref 4.5–5.9)
SODIUM SERPL-SCNC: 145 MMOL/L (ref 135–144)
WBC OTHER # BLD: 2.9 K/UL (ref 3.5–11)
WBC OTHER # BLD: 3.7 K/UL (ref 3.5–11)

## 2024-06-19 PROCEDURE — 82947 ASSAY GLUCOSE BLOOD QUANT: CPT

## 2024-06-19 PROCEDURE — 6370000000 HC RX 637 (ALT 250 FOR IP): Performed by: INTERNAL MEDICINE

## 2024-06-19 PROCEDURE — 85025 COMPLETE CBC W/AUTO DIFF WBC: CPT

## 2024-06-19 PROCEDURE — 2580000003 HC RX 258: Performed by: STUDENT IN AN ORGANIZED HEALTH CARE EDUCATION/TRAINING PROGRAM

## 2024-06-19 PROCEDURE — 36415 COLL VENOUS BLD VENIPUNCTURE: CPT

## 2024-06-19 PROCEDURE — 99232 SBSQ HOSP IP/OBS MODERATE 35: CPT | Performed by: INTERNAL MEDICINE

## 2024-06-19 PROCEDURE — 80048 BASIC METABOLIC PNL TOTAL CA: CPT

## 2024-06-19 PROCEDURE — 6360000002 HC RX W HCPCS: Performed by: STUDENT IN AN ORGANIZED HEALTH CARE EDUCATION/TRAINING PROGRAM

## 2024-06-19 PROCEDURE — 99232 SBSQ HOSP IP/OBS MODERATE 35: CPT | Performed by: STUDENT IN AN ORGANIZED HEALTH CARE EDUCATION/TRAINING PROGRAM

## 2024-06-19 PROCEDURE — 2060000000 HC ICU INTERMEDIATE R&B

## 2024-06-19 PROCEDURE — 6370000000 HC RX 637 (ALT 250 FOR IP): Performed by: STUDENT IN AN ORGANIZED HEALTH CARE EDUCATION/TRAINING PROGRAM

## 2024-06-19 RX ORDER — SODIUM CHLORIDE 450 MG/100ML
INJECTION, SOLUTION INTRAVENOUS CONTINUOUS
Status: DISCONTINUED | OUTPATIENT
Start: 2024-06-19 | End: 2024-06-19

## 2024-06-19 RX ORDER — CETIRIZINE HYDROCHLORIDE 10 MG/1
5 TABLET ORAL DAILY
Status: DISCONTINUED | OUTPATIENT
Start: 2024-06-19 | End: 2024-06-22 | Stop reason: HOSPADM

## 2024-06-19 RX ADMIN — SODIUM CHLORIDE, PRESERVATIVE FREE 10 ML: 5 INJECTION INTRAVENOUS at 21:24

## 2024-06-19 RX ADMIN — DILTIAZEM HYDROCHLORIDE 30 MG: 30 TABLET, FILM COATED ORAL at 13:55

## 2024-06-19 RX ADMIN — SODIUM CHLORIDE: 4.5 INJECTION, SOLUTION INTRAVENOUS at 10:02

## 2024-06-19 RX ADMIN — CETIRIZINE HYDROCHLORIDE 5 MG: 10 TABLET, FILM COATED ORAL at 21:24

## 2024-06-19 RX ADMIN — ATORVASTATIN CALCIUM 20 MG: 20 TABLET, FILM COATED ORAL at 09:03

## 2024-06-19 RX ADMIN — BRIMONIDINE TARTRATE 1 DROP: 2 SOLUTION OPHTHALMIC at 09:09

## 2024-06-19 RX ADMIN — CARVEDILOL 25 MG: 12.5 TABLET, FILM COATED ORAL at 09:03

## 2024-06-19 RX ADMIN — FENOFIBRATE 160 MG: 160 TABLET ORAL at 09:02

## 2024-06-19 RX ADMIN — LANSOPRAZOLE 30 MG: 15 TABLET, ORALLY DISINTEGRATING ORAL at 05:31

## 2024-06-19 RX ADMIN — LISINOPRIL 40 MG: 20 TABLET ORAL at 09:02

## 2024-06-19 RX ADMIN — DILTIAZEM HYDROCHLORIDE 30 MG: 30 TABLET, FILM COATED ORAL at 21:23

## 2024-06-19 RX ADMIN — BRIMONIDINE TARTRATE 1 DROP: 2 SOLUTION OPHTHALMIC at 21:25

## 2024-06-19 RX ADMIN — DEXAMETHASONE 40 MG: 4 TABLET ORAL at 09:02

## 2024-06-19 RX ADMIN — DILTIAZEM HYDROCHLORIDE 30 MG: 30 TABLET, FILM COATED ORAL at 05:31

## 2024-06-19 NOTE — PROGRESS NOTES
Today's Date: 6/19/2024  Patient Name: Bruno Morales  Date of admission: 6/15/2024  3:29 PM  Patient's age: 73 y.o., 1950  Admission Dx: Bigeminy [I49.8]  Thrombocytopenia (HCC) [D69.6]    Reason for Consult: management recommendations  Requesting Physician: Rk Be MD    CHIEF COMPLAINT: Severe thrombocytopenia    SUBJECTIVE:  .  The patient was seen and examined.  He is clinically stable.  No active bleeding.  Platelets are up to 25,000.  He is feeling much better.  Tolerating Decadron very well.        Interval history:    Patient seen and examined  Labs vitals reviewed  Platelet count dropped to 15,000.  Patient denies noticing any bleeding.  WBC count 2.9 ANC 2.5.  Patient scheduled to sleeve day #4 of Decadron 40 mg.      BRIEF CASE HISTORY:    The patient is a 73 y.o.   male who is admitted to the hospital for petechiae and was found to have severe thrombocytopenia which is new onset.  The patient describes fatigue, mucosal bleeding from the mouth.  No hemorrhagic diatheses otherwise.  No hematuria or blood in the stool.  Upon evaluation, platelets were down to 30,000.  Evaluation of blood smear was unremarkable.  No evidence of hemolysis based on basic workup.  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  Past Medical History:   has a past medical history of Hyperlipidemia and Hypertension.    Past Surgical History:   has a past surgical history that includes Tonsillectomy.     Medications:    Reviewed in Epic     Allergies:  Asa [aspirin], Fish-derived products, Penicillins, and Tetracyclines & related    Social History:   reports that he has never smoked. He has never used smokeless tobacco. He reports that he does not drink alcohol and does not use drugs.     Family History: family history is not on file.    REVIEW OF SYSTEMS:    Constitutional: No fever or chills. No night sweats, no weight loss   Eyes: No eye discharge, double vision, or eye pain

## 2024-06-19 NOTE — PLAN OF CARE
Problem: Discharge Planning  Goal: Discharge to home or other facility with appropriate resources  6/19/2024 0200 by Victor Hugo Oliva RN  Outcome: Progressing  Flowsheets (Taken 6/19/2024 0200)  Discharge to home or other facility with appropriate resources:   Identify barriers to discharge with patient and caregiver   Identify discharge learning needs (meds, wound care, etc)     Problem: Skin/Tissue Integrity  Goal: Absence of new skin breakdown  Description: 1.  Monitor for areas of redness and/or skin breakdown  2.  Assess vascular access sites hourly  3.  Every 4-6 hours minimum:  Change oxygen saturation probe site  4.  Every 4-6 hours:  If on nasal continuous positive airway pressure, respiratory therapy assess nares and determine need for appliance change or resting period.  6/19/2024 0200 by Victor Hugo Oliva RN  Outcome: Progressing     Problem: ABCDS Injury Assessment  Goal: Absence of physical injury  6/19/2024 0200 by Victor Hugo Oliva RN  Outcome: Progressing  Flowsheets (Taken 6/19/2024 0200)  Absence of Physical Injury: Implement safety measures based on patient assessment     Problem: Safety - Adult  Goal: Free from fall injury  6/19/2024 0200 by Victor Hugo Oliva RN  Outcome: Progressing     Problem: Cardiovascular - Adult  Goal: Maintains optimal cardiac output and hemodynamic stability  6/19/2024 0200 by Victor Hugo Oliva RN  Outcome: Progressing     Problem: Cardiovascular - Adult  Goal: Absence of cardiac dysrhythmias or at baseline  6/19/2024 0200 by Victor Hugo Oliva RN  Outcome: Progressing     Problem: Genitourinary - Adult  Goal: Absence of urinary retention  6/19/2024 0200 by Victor Hugo Oliva RN  Outcome: Progressing     Problem: Hematologic - Adult  Goal: Maintains hematologic stability  6/19/2024 0200 by Victor Hugo Oliva RN  Outcome: Progressing

## 2024-06-19 NOTE — CARE COORDINATION
[facility] Quality Flow/Interdisciplinary Rounds Progress Note    Quality Flow Rounds held on June 19, 2024 at 0930    Disciplines Attending:  Bedside Nurse, , , and Nursing Unit Leadership    Barriers to Discharge: Clinical status    Anticipated Discharge Date:   6/20/24    Anticipated Discharge Disposition: Home    Readmission Risk              Risk of Unplanned Readmission:  17           Discussed patient goal for the day, patient clinical progression, and barriers to discharge.  Oncology following. Steroids to be completed today. RN reports possible discharge tomorrow. Patient plans to return home independently at discharge. No needs.    Lucille Pedraza RN  June 19, 2024

## 2024-06-19 NOTE — PROGRESS NOTES
[facility]    [facility]   IN-PATIENT SERVICE   [facility]    Progress Note    6/19/2024    1:49 PM    Name:   Bruno Morales  MRN:     [id number]     Acct:      [id number]   Room:   333/333-01   Day:  4  Admit Date:  6/15/2024  3:29 PM    PCP:   Margarette Vallejo MD  Code Status:  Full Code    Subjective:     C/C: No chief complaint on file.  Thrombocytopenia, petechiae  Interval History Status: improved.     Seen at bedside, hemodynamically stable  No acute events overnight, no new complaints  Continues to be on steroids, platelets dropped again today to 15, no concern for active bleed,  Rest of the labs reviewed.    Brief History:     73-year-old male with history of essential hypertension, hyperlipidemia, stroke, CKD, anemia who was admitted because of thrombocytopenia/petechiae, CT imaging showed multiple enlarged lymph nodes in mediastinum, bandar, several nodules scattered throughout lungs, splenomegaly, prostatic enlargement, mesenteric lymph nodes , heme-onc was consulted, patient was treated with steroids for concern of ITP, cardiology service was consulted for concern of frequent PVCs/bigeminy, patient was started on low-dose diltiazem, echocardiogram ordered, showed EF of 55 to 50%, with normal wall thickness and normal wall motion, abnormal diastolic function, mild to moderate MR, mild to moderate TR, mild pulmonary hypertension with right ventricular dilation, left atrial dilation, trivial pericardial effusion with no concern for tamponade    Medications:     Allergies:    Allergies   Allergen Reactions    Asa [Aspirin]     Fish-Derived Products     Penicillins     Tetracyclines & Related        Current Meds:   Scheduled Meds:    cetirizine  5 mg Oral Daily    dexAMETHasone  40 mg Oral Daily    dilTIAZem  30 mg Oral 3 times per day    insulin lispro  0-4 Units SubCUTAneous TID WC    insulin lispro  0-4 Units SubCUTAneous Nightly    lansoprazole  30 mg Oral QAM AC

## 2024-06-19 NOTE — PLAN OF CARE
Problem: Discharge Planning  Goal: Discharge to home or other facility with appropriate resources  Outcome: Progressing     Problem: Safety - Adult  Goal: Free from fall injury  Outcome: Progressing     Problem: Neurosensory - Adult  Goal: Achieves maximal functionality and self care  Outcome: Progressing     Problem: Cardiovascular - Adult  Goal: Absence of cardiac dysrhythmias or at baseline  Outcome: Progressing     Problem: Metabolic/Fluid and Electrolytes - Adult  Goal: Electrolytes maintained within normal limits  Outcome: Progressing

## 2024-06-20 LAB
ANION GAP SERPL CALCULATED.3IONS-SCNC: 9 MMOL/L (ref 9–17)
BASOPHILS # BLD: 0 K/UL (ref 0–0.2)
BASOPHILS # BLD: 0 K/UL (ref 0–0.2)
BASOPHILS NFR BLD: 0 % (ref 0–2)
BASOPHILS NFR BLD: 0 % (ref 0–2)
BUN SERPL-MCNC: 51 MG/DL (ref 8–23)
CALCIUM SERPL-MCNC: 7.9 MG/DL (ref 8.6–10.4)
CHLORIDE SERPL-SCNC: 116 MMOL/L (ref 98–107)
CO2 SERPL-SCNC: 20 MMOL/L (ref 20–31)
CREAT SERPL-MCNC: 1.5 MG/DL (ref 0.7–1.2)
EOSINOPHIL # BLD: 0 K/UL (ref 0–0.4)
EOSINOPHIL # BLD: 0 K/UL (ref 0–0.4)
EOSINOPHILS RELATIVE PERCENT: 0 % (ref 1–4)
EOSINOPHILS RELATIVE PERCENT: 0 % (ref 1–4)
ERYTHROCYTE [DISTWIDTH] IN BLOOD BY AUTOMATED COUNT: 13.9 % (ref 12.5–15.4)
ERYTHROCYTE [DISTWIDTH] IN BLOOD BY AUTOMATED COUNT: 14.3 % (ref 12.5–15.4)
GFR, ESTIMATED: 49 ML/MIN/1.73M2
GLUCOSE BLD-MCNC: 122 MG/DL (ref 75–110)
GLUCOSE BLD-MCNC: 136 MG/DL (ref 75–110)
GLUCOSE BLD-MCNC: 164 MG/DL (ref 75–110)
GLUCOSE BLD-MCNC: 199 MG/DL (ref 75–110)
GLUCOSE SERPL-MCNC: 131 MG/DL (ref 70–99)
HCT VFR BLD AUTO: 29.9 % (ref 41–53)
HCT VFR BLD AUTO: 31.9 % (ref 41–53)
HGB BLD-MCNC: 10.3 G/DL (ref 13.5–17.5)
HGB BLD-MCNC: 10.7 G/DL (ref 13.5–17.5)
LYMPHOCYTES NFR BLD: 0.21 K/UL (ref 1–4.8)
LYMPHOCYTES NFR BLD: 0.39 K/UL (ref 1–4.8)
LYMPHOCYTES RELATIVE PERCENT: 15 % (ref 24–44)
LYMPHOCYTES RELATIVE PERCENT: 6 % (ref 24–44)
MCH RBC QN AUTO: 29 PG (ref 26–34)
MCH RBC QN AUTO: 29.8 PG (ref 26–34)
MCHC RBC AUTO-ENTMCNC: 33.6 G/DL (ref 31–37)
MCHC RBC AUTO-ENTMCNC: 34.4 G/DL (ref 31–37)
MCV RBC AUTO: 86.5 FL (ref 80–100)
MCV RBC AUTO: 86.5 FL (ref 80–100)
MONOCYTES NFR BLD: 0.04 K/UL (ref 0.1–0.8)
MONOCYTES NFR BLD: 0.1 K/UL (ref 0.1–0.8)
MONOCYTES NFR BLD: 1 % (ref 1–7)
MONOCYTES NFR BLD: 4 % (ref 1–7)
MORPHOLOGY: NORMAL
MORPHOLOGY: NORMAL
NEUTROPHILS NFR BLD: 81 % (ref 36–66)
NEUTROPHILS NFR BLD: 93 % (ref 36–66)
NEUTS SEG NFR BLD: 2.11 K/UL (ref 1.8–7.7)
NEUTS SEG NFR BLD: 3.25 K/UL (ref 1.8–7.7)
PLATELET # BLD AUTO: 12 K/UL (ref 140–450)
PLATELET # BLD AUTO: 19 K/UL (ref 140–450)
PMV BLD AUTO: 10.8 FL (ref 6–12)
PMV BLD AUTO: 11.1 FL (ref 6–12)
POTASSIUM SERPL-SCNC: 4.5 MMOL/L (ref 3.7–5.3)
RBC # BLD AUTO: 3.46 M/UL (ref 4.5–5.9)
RBC # BLD AUTO: 3.69 M/UL (ref 4.5–5.9)
SODIUM SERPL-SCNC: 145 MMOL/L (ref 135–144)
WBC OTHER # BLD: 2.6 K/UL (ref 3.5–11)
WBC OTHER # BLD: 3.5 K/UL (ref 3.5–11)

## 2024-06-20 PROCEDURE — 6370000000 HC RX 637 (ALT 250 FOR IP): Performed by: INTERNAL MEDICINE

## 2024-06-20 PROCEDURE — 2580000003 HC RX 258: Performed by: STUDENT IN AN ORGANIZED HEALTH CARE EDUCATION/TRAINING PROGRAM

## 2024-06-20 PROCEDURE — 80048 BASIC METABOLIC PNL TOTAL CA: CPT

## 2024-06-20 PROCEDURE — 99231 SBSQ HOSP IP/OBS SF/LOW 25: CPT | Performed by: INTERNAL MEDICINE

## 2024-06-20 PROCEDURE — 36415 COLL VENOUS BLD VENIPUNCTURE: CPT

## 2024-06-20 PROCEDURE — 82947 ASSAY GLUCOSE BLOOD QUANT: CPT

## 2024-06-20 PROCEDURE — 6370000000 HC RX 637 (ALT 250 FOR IP): Performed by: STUDENT IN AN ORGANIZED HEALTH CARE EDUCATION/TRAINING PROGRAM

## 2024-06-20 PROCEDURE — 99232 SBSQ HOSP IP/OBS MODERATE 35: CPT | Performed by: INTERNAL MEDICINE

## 2024-06-20 PROCEDURE — 6360000002 HC RX W HCPCS: Performed by: STUDENT IN AN ORGANIZED HEALTH CARE EDUCATION/TRAINING PROGRAM

## 2024-06-20 PROCEDURE — 85025 COMPLETE CBC W/AUTO DIFF WBC: CPT

## 2024-06-20 PROCEDURE — 88184 FLOWCYTOMETRY/ TC 1 MARKER: CPT

## 2024-06-20 PROCEDURE — 2060000000 HC ICU INTERMEDIATE R&B

## 2024-06-20 PROCEDURE — 88185 FLOWCYTOMETRY/TC ADD-ON: CPT

## 2024-06-20 RX ADMIN — BRIMONIDINE TARTRATE 1 DROP: 2 SOLUTION OPHTHALMIC at 20:56

## 2024-06-20 RX ADMIN — CARVEDILOL 25 MG: 12.5 TABLET, FILM COATED ORAL at 08:01

## 2024-06-20 RX ADMIN — DILTIAZEM HYDROCHLORIDE 30 MG: 30 TABLET, FILM COATED ORAL at 14:22

## 2024-06-20 RX ADMIN — BRIMONIDINE TARTRATE 1 DROP: 2 SOLUTION OPHTHALMIC at 08:07

## 2024-06-20 RX ADMIN — SODIUM CHLORIDE, PRESERVATIVE FREE 10 ML: 5 INJECTION INTRAVENOUS at 20:56

## 2024-06-20 RX ADMIN — LANSOPRAZOLE 30 MG: 15 TABLET, ORALLY DISINTEGRATING ORAL at 06:16

## 2024-06-20 RX ADMIN — FENOFIBRATE 160 MG: 160 TABLET ORAL at 07:58

## 2024-06-20 RX ADMIN — LISINOPRIL 40 MG: 20 TABLET ORAL at 08:00

## 2024-06-20 RX ADMIN — CARVEDILOL 25 MG: 12.5 TABLET, FILM COATED ORAL at 17:14

## 2024-06-20 RX ADMIN — SODIUM CHLORIDE, PRESERVATIVE FREE 10 ML: 5 INJECTION INTRAVENOUS at 08:02

## 2024-06-20 RX ADMIN — ATORVASTATIN CALCIUM 20 MG: 20 TABLET, FILM COATED ORAL at 08:01

## 2024-06-20 RX ADMIN — DILTIAZEM HYDROCHLORIDE 30 MG: 30 TABLET, FILM COATED ORAL at 20:56

## 2024-06-20 RX ADMIN — DEXAMETHASONE 40 MG: 4 TABLET ORAL at 07:58

## 2024-06-20 RX ADMIN — CETIRIZINE HYDROCHLORIDE 5 MG: 10 TABLET, FILM COATED ORAL at 20:56

## 2024-06-20 ASSESSMENT — PAIN SCALES - GENERAL
PAINLEVEL_OUTOF10: 0

## 2024-06-20 NOTE — PLAN OF CARE
Nutrition Problem #1: Altered nutrition-related lab values  Intervention: Food and/or Nutrient Delivery: Continue Current Diet  Nutritional Goal: At least 50% PO intake prior to discharge

## 2024-06-20 NOTE — PROGRESS NOTES
Comprehensive Nutrition Assessment    Type and Reason for Visit:  RD Nutrition Re-Screen/LOS    Nutrition Recommendations/Plan:   Continue current diet, encourage PO intake  Monitor weight, intake, labs, skin     Malnutrition Assessment:  Malnutrition Status:  No malnutrition (06/20/24 1427)    Context:  Acute Illness     Findings of the 6 clinical characteristics of malnutrition:  Energy Intake:  No significant decrease in energy intake  Weight Loss:  Greater than 7.5% over 3 months     Body Fat Loss:  Unable to assess     Muscle Mass Loss:  No significant muscle mass loss    Fluid Accumulation:  No significant fluid accumulation     Strength:  Not Performed    Nutrition Assessment:    Pt admitted with thrombocytopenia. Length of stay nutrition assessment. Noted pt has fish allergy listed, writer verified allergy was also listed in [employer] menu system. No report of recent weight loss or poor appetite. Weight is stable per EMR. No wounds noted. PO intake % during admission.    Nutrition Related Findings:    No edema noted. BUN 51, Cr 1.5, GFR 49, Glu 131, Ca 7.9, Alb 2.9. All other labs/meds reviewed. Wound Type: None       Current Nutrition Intake & Therapies:    Average Meal Intake: %  Average Supplements Intake: None Ordered  ADULT DIET; Regular    Anthropometric Measures:  Height: 177.8 cm (5' 10\")  Ideal Body Weight (IBW): 166 lbs (75 kg)    Current Body Weight: 75 kg (165 lb 5.5 oz), 99.6 % IBW.    Current BMI (kg/m2): 23.7  BMI Categories: Underweight (BMI less than 22) age over 65    Estimated Daily Nutrient Needs:  Energy Requirements Based On: Kcal/kg  Weight Used for Energy Requirements: Current  Energy (kcal/day): 1796-3610 kcal/day (26-29 kcal/kg)  Weight Used for Protein Requirements: Current  Protein (g/day): 112-150 g/day (1.5-2.0 g/kg CBW)  Method Used for Fluid Requirements: 1 ml/kcal  Fluid (ml/day): 7850-7517 ml    Nutrition Diagnosis:   Altered nutrition-related lab values related

## 2024-06-20 NOTE — PROGRESS NOTES
Today's Date: 6/20/2024  Patient Name: Bruno Morales  Date of admission: 6/15/2024  3:29 PM  Patient's age: 73 y.o., 1950  Admission Dx: Bigeminy [I49.8]  Thrombocytopenia (HCC) [D69.6]    Reason for Consult: management recommendations  Requesting Physician: Rk Be MD    CHIEF COMPLAINT: Severe thrombocytopenia      Interval history:    Patient seen and examined  Labs vitals reviewed  Platelets are stable around 90,000.  He denies any active bleeding.  I noticed his white count is dropping as well.    BRIEF CASE HISTORY:    The patient is a 73 y.o.   male who is admitted to the hospital for petechiae and was found to have severe thrombocytopenia which is new onset.  The patient describes fatigue, mucosal bleeding from the mouth.  No hemorrhagic diatheses otherwise.  No hematuria or blood in the stool.  Upon evaluation, platelets were down to 30,000.  Evaluation of blood smear was unremarkable.  No evidence of hemolysis based on basic workup.  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  Past Medical History:   has a past medical history of Hyperlipidemia and Hypertension.    Past Surgical History:   has a past surgical history that includes Tonsillectomy.     Medications:    Reviewed in Epic     Allergies:  Asa [aspirin], Fish-derived products, Penicillins, and Tetracyclines & related    Social History:   reports that he has never smoked. He has never used smokeless tobacco. He reports that he does not drink alcohol and does not use drugs.     Family History: family history is not on file.    REVIEW OF SYSTEMS:    Constitutional: No fever or chills. No night sweats, no weight loss   Eyes: No eye discharge, double vision, or eye pain   HEENT: negative for sore mouth, sore throat, hoarseness and voice change   Respiratory: negative for cough , sputum, dyspnea, wheezing, hemoptysis, chest pain   Cardiovascular: negative for chest pain, dyspnea, palpitations,

## 2024-06-20 NOTE — PROGRESS NOTES
[facility]  Office: [phone]  Phani Andino DO, Carlos A Hernandez DO, Sandor Rivera DO, Dre Marroquin, DO, Cecelia Murillo MD, Rosie Robbins MD, Castro Hope MD, Becka Sharma MD,  Wolf Reardon MD, Edna Truong MD, Jamee Padilla MD,  Bia Weir DO, Jacob Riley MD, Rk Be MD, Diego Andino DO, Viviana Marroquin MD,  Jesse Mahan DO, Lauryn Rsoe MD, Paula Ramsay MD, Alyssia Henley MD, Chari Pastor MD,  Reynold Lennon MD, Luca Eckert MD, Speedy Durham MD, Devyn Moreno MD, Heber Valenzuela MD, David Paiz MD, Chava Nicole DO, Silverio Barrett DO, Lana Ahuja MD,  Sebastian Braswell MD, Shirley Waterhouse, CNP,  Darlyn Savage CNP, Eagle Yoon, CNP,  Luzmaria Mitchell, DNP, Tonja Vizcaino, CNP, Tiff Hamilton, CNP, Glenna Taylor, CNP, Lili Cool, CNP, Amina Harrell, PA-C, Cheyenne Parker, PA-C, Letitia Lynch, CNP, Jacque Lorenzo, CNP, Violette Romo, CNP, Kelly Minor, CNP, Marilynn Acuña, CNP, Lluvia Anand, CNS, Nerissa Resendiz, CNP, Ashley Benítez CNP, Tracy Schwab, CNP         [facility]   IN-PATIENT SERVICE   [facility]    Progress Note    6/20/2024    8:42 AM    Name:   Bruno Morales  MRN:     [id number]     Acct:      [id number]   Room:   333/333-01   Day:  5  Admit Date:  6/15/2024  3:29 PM    PCP:   Margarette Vallejo MD  Code Status:  Full Code    Subjective:     C/C: Petechiae, low platelets    Interval History Status: not changed.     Patient concerned about fluid retention and increased urination while being on steroids, no active bleeding, platelet level-19.  Wife is present.    Brief History:     Per my partner:  \"73-year-old male with history of essential hypertension, hyperlipidemia, stroke, CKD, anemia who was admitted because of thrombocytopenia/petechiae, CT imaging showed multiple enlarged lymph nodes in mediastinum, bandar, several nodules scattered throughout lungs, splenomegaly, prostatic enlargement,  polyethylene glycol, acetaminophen **OR** acetaminophen    Data:     Past Medical History:   has a past medical history of Hyperlipidemia and Hypertension.    Social History:   reports that he has never smoked. He has never used smokeless tobacco. He reports that he does not drink alcohol and does not use drugs.     Family History: No family history on file.    Vitals:  BP (!) 119/56   Pulse 52   Temp 97.7 °F (36.5 °C) (Oral)   Resp 16   Ht 1.778 m (5' 10\")   Wt 75 kg (165 lb 5.5 oz)   SpO2 96%   BMI 23.72 kg/m²   Temp (24hrs), Av.7 °F (36.5 °C), Min:97.5 °F (36.4 °C), Max:98.1 °F (36.7 °C)    Recent Labs     24  1215 24  1716 24  2104 24  0748   POCGLU 176* 147* 263* 122*       I/O (24Hr):    Intake/Output Summary (Last 24 hours) at 2024 0842  Last data filed at 2024 1414  Gross per 24 hour   Intake 4184.59 ml   Output --   Net 4184.59 ml       Labs:  Hematology:  Recent Labs     24  0601 24  1552 24  0723   WBC 2.9* 3.7 2.6*   RBC 3.18* 3.47* 3.46*   HGB 9.4* 10.2* 10.3*   HCT 27.8* 30.1* 29.9*   MCV 87.4 86.7 86.5   MCH 29.5 29.4 29.8   MCHC 33.8 33.9 34.4   RDW 13.9 14.0 13.9   PLT 15* 18* 19*   MPV 11.3 11.2 10.8     Chemistry:  Recent Labs     24  0638 24  0601 24  0723    145* 145*   K 4.2 4.2 4.5   * 116* 116*   CO2 18* 18* 20   GLUCOSE 157* 151* 131*   BUN 46* 51* 51*   CREATININE 1.5* 1.6* 1.5*   ANIONGAP 10 11 9   LABGLOM 49* 45* 49*   CALCIUM 8.1* 7.7* 7.9*     Recent Labs     24  0638 24  0825 24  2107 24  0854 24  1215 24  1716 24  2104 24  0748   LABA1C 4.2  --   --   --   --   --   --   --    POCGLU  --    < > 186* 164* 176* 147* 263* 122*    < > = values in this interval not displayed.     ABG:No results found for: \"POCPH\", \"PHART\", \"PH\", \"POCPCO2\", \"RCG2IYY\", \"PCO2\", \"POCPO2\", \"PO2ART\", \"PO2\", \"POCHCO3\", \"NNJ6RVI\", \"HCO3\", \"NBEA\", \"PBEA\", \"BEART\", \"BE\",

## 2024-06-20 NOTE — CARE COORDINATION
[facility] Quality Flow/Interdisciplinary Rounds Progress Note    Quality Flow Rounds held on June 20, 2024 at 0930    Disciplines Attending:  Bedside Nurse, , , and Nursing Unit Leadership    Barriers to Discharge: Clinical status    Anticipated Discharge Date:   6/21/24    Anticipated Discharge Disposition: Home    Readmission Risk              Risk of Unplanned Readmission:  17           Discussed patient goal for the day, patient clinical progression, and barriers to discharge.  Possible discharge later today if oncology is ok with discharge.  Lucille Pedraza RN  June 20, 2024

## 2024-06-20 NOTE — PLAN OF CARE
Problem: Discharge Planning  Goal: Discharge to home or other facility with appropriate resources  Outcome: Progressing     Problem: Skin/Tissue Integrity  Goal: Absence of new skin breakdown  Description: 1.  Monitor for areas of redness and/or skin breakdown  2.  Assess vascular access sites hourly  3.  Every 4-6 hours minimum:  Change oxygen saturation probe site  4.  Every 4-6 hours:  If on nasal continuous positive airway pressure, respiratory therapy assess nares and determine need for appliance change or resting period.  Outcome: Progressing     Problem: ABCDS Injury Assessment  Goal: Absence of physical injury  Outcome: Progressing     Problem: Safety - Adult  Goal: Free from fall injury  Outcome: Progressing     Problem: Neurosensory - Adult  Goal: Achieves stable or improved neurological status  Outcome: Progressing

## 2024-06-21 LAB
ANION GAP SERPL CALCULATED.3IONS-SCNC: 11 MMOL/L (ref 9–17)
BASOPHILS # BLD: 0 K/UL (ref 0–0.2)
BASOPHILS NFR BLD: 0 % (ref 0–2)
BUN SERPL-MCNC: 45 MG/DL (ref 8–23)
CALCIUM SERPL-MCNC: 8 MG/DL (ref 8.6–10.4)
CHLORIDE SERPL-SCNC: 116 MMOL/L (ref 98–107)
CO2 SERPL-SCNC: 20 MMOL/L (ref 20–31)
CREAT SERPL-MCNC: 1.3 MG/DL (ref 0.7–1.2)
EOSINOPHIL # BLD: 0 K/UL (ref 0–0.4)
EOSINOPHILS RELATIVE PERCENT: 0 % (ref 1–4)
ERYTHROCYTE [DISTWIDTH] IN BLOOD BY AUTOMATED COUNT: 13.9 % (ref 12.5–15.4)
GFR, ESTIMATED: 58 ML/MIN/1.73M2
GLUCOSE BLD-MCNC: 124 MG/DL (ref 75–110)
GLUCOSE BLD-MCNC: 142 MG/DL (ref 75–110)
GLUCOSE BLD-MCNC: 169 MG/DL (ref 75–110)
GLUCOSE BLD-MCNC: 197 MG/DL (ref 75–110)
GLUCOSE SERPL-MCNC: 139 MG/DL (ref 70–99)
HCT VFR BLD AUTO: 30.3 % (ref 41–53)
HGB BLD-MCNC: 10.3 G/DL (ref 13.5–17.5)
LYMPHOCYTES NFR BLD: 0.26 K/UL (ref 1–4.8)
LYMPHOCYTES RELATIVE PERCENT: 12 % (ref 24–44)
MCH RBC QN AUTO: 29.3 PG (ref 26–34)
MCHC RBC AUTO-ENTMCNC: 34 G/DL (ref 31–37)
MCV RBC AUTO: 86.2 FL (ref 80–100)
MONOCYTES NFR BLD: 0.15 K/UL (ref 0.1–0.8)
MONOCYTES NFR BLD: 7 % (ref 1–7)
MORPHOLOGY: ABNORMAL
NEUTROPHILS NFR BLD: 81 % (ref 36–66)
NEUTS SEG NFR BLD: 1.79 K/UL (ref 1.8–7.7)
NUCLEATED RED BLOOD CELLS: 2 PER 100 WBC
PLATELET # BLD AUTO: 11 K/UL (ref 140–450)
PMV BLD AUTO: 11.7 FL (ref 6–12)
POTASSIUM SERPL-SCNC: 4.4 MMOL/L (ref 3.7–5.3)
RBC # BLD AUTO: 3.52 M/UL (ref 4.5–5.9)
SODIUM SERPL-SCNC: 147 MMOL/L (ref 135–144)
SURGICAL PATHOLOGY REPORT: NORMAL
WBC OTHER # BLD: 2.2 K/UL (ref 3.5–11)

## 2024-06-21 PROCEDURE — 2060000000 HC ICU INTERMEDIATE R&B

## 2024-06-21 PROCEDURE — 6370000000 HC RX 637 (ALT 250 FOR IP): Performed by: INTERNAL MEDICINE

## 2024-06-21 PROCEDURE — 82947 ASSAY GLUCOSE BLOOD QUANT: CPT

## 2024-06-21 PROCEDURE — 80048 BASIC METABOLIC PNL TOTAL CA: CPT

## 2024-06-21 PROCEDURE — 85025 COMPLETE CBC W/AUTO DIFF WBC: CPT

## 2024-06-21 PROCEDURE — 6370000000 HC RX 637 (ALT 250 FOR IP): Performed by: STUDENT IN AN ORGANIZED HEALTH CARE EDUCATION/TRAINING PROGRAM

## 2024-06-21 PROCEDURE — 2580000003 HC RX 258: Performed by: STUDENT IN AN ORGANIZED HEALTH CARE EDUCATION/TRAINING PROGRAM

## 2024-06-21 PROCEDURE — 99232 SBSQ HOSP IP/OBS MODERATE 35: CPT | Performed by: INTERNAL MEDICINE

## 2024-06-21 PROCEDURE — 6360000002 HC RX W HCPCS: Performed by: STUDENT IN AN ORGANIZED HEALTH CARE EDUCATION/TRAINING PROGRAM

## 2024-06-21 PROCEDURE — 36415 COLL VENOUS BLD VENIPUNCTURE: CPT

## 2024-06-21 PROCEDURE — 6360000002 HC RX W HCPCS: Performed by: INTERNAL MEDICINE

## 2024-06-21 RX ADMIN — LISINOPRIL 40 MG: 20 TABLET ORAL at 08:51

## 2024-06-21 RX ADMIN — ATORVASTATIN CALCIUM 20 MG: 20 TABLET, FILM COATED ORAL at 08:51

## 2024-06-21 RX ADMIN — CARVEDILOL 25 MG: 12.5 TABLET, FILM COATED ORAL at 16:42

## 2024-06-21 RX ADMIN — BRIMONIDINE TARTRATE 1 DROP: 2 SOLUTION OPHTHALMIC at 22:22

## 2024-06-21 RX ADMIN — CETIRIZINE HYDROCHLORIDE 5 MG: 10 TABLET, FILM COATED ORAL at 22:22

## 2024-06-21 RX ADMIN — SODIUM CHLORIDE, PRESERVATIVE FREE 10 ML: 5 INJECTION INTRAVENOUS at 08:52

## 2024-06-21 RX ADMIN — LANSOPRAZOLE 30 MG: 15 TABLET, ORALLY DISINTEGRATING ORAL at 06:09

## 2024-06-21 RX ADMIN — SODIUM CHLORIDE, PRESERVATIVE FREE 10 ML: 5 INJECTION INTRAVENOUS at 22:22

## 2024-06-21 RX ADMIN — DEXAMETHASONE 40 MG: 4 TABLET ORAL at 08:52

## 2024-06-21 RX ADMIN — CARVEDILOL 25 MG: 12.5 TABLET, FILM COATED ORAL at 08:51

## 2024-06-21 RX ADMIN — FENOFIBRATE 160 MG: 160 TABLET ORAL at 08:51

## 2024-06-21 RX ADMIN — DILTIAZEM HYDROCHLORIDE 30 MG: 30 TABLET, FILM COATED ORAL at 13:45

## 2024-06-21 RX ADMIN — IMMUNE GLOBULIN (HUMAN) 75000 MG: 10 INJECTION INTRAVENOUS; SUBCUTANEOUS at 12:45

## 2024-06-21 RX ADMIN — BRIMONIDINE TARTRATE 1 DROP: 2 SOLUTION OPHTHALMIC at 08:52

## 2024-06-21 NOTE — PROGRESS NOTES
Updated Dr. Jaime via secure message on pt status and infusion of gamunex C; asked when he would like repeat labs for measuring platelets. He stated he will place orders.

## 2024-06-21 NOTE — PROGRESS NOTES
SPIRITUAL CARE DEPARTMENT [facility]  PROGRESS NOTE    Room # 333/333-01   Name: Bruno Morales            Islam:Buddhism    Reason for visit: Follow up    I visited the patient.    Admit Date & Time: 6/15/2024  3:29 PM    Assessment:  Bruno Morales is a 73 y.o. male in the hospital because Thrombocytopenia. Upon entering the room Pt was open to conversation. Pt was concerned about health issues and solution.      Intervention:  I introduced myself and my title as  I offered space for PT  to express feelings, needs, and concerns and provided a ministry presence.  provided safety in discussing concerns about health.  provided support and care to Pt.    Outcome:  Pt expressed concerns about spouse and her health' Pt was thankful for visit by     Plan:  Chaplains will remain available to offer spiritual and emotional support as needed.    Electronically signed by Chaplain DREW, on 6/20/2024 at 8:41 PM.  Spiritual Care Department  [facility]      06/20/24 2039   Encounter Summary   Encounter Overview/Reason Spiritual/Emotional Needs;Grief, Loss, and Adjustments   Service Provided For Patient   Support System Spouse;Children   Last Encounter  06/15/24   Complexity of Encounter Moderate   Begin Time 2020   End Time  2030   Total Time Calculated 10 min   Crisis   Type Emotional distress   Spiritual/Emotional needs   Type Spiritual Support   Grief, Loss, and Adjustments   Type Life Adjustments   Assessment/Intervention/Outcome   Assessment Calm;Coping   Intervention Active listening;Discussed illness injury and it’s impact   Outcome Encouraged;Coping   Plan and Referrals   Plan/Referrals Continue to visit, (comment)

## 2024-06-21 NOTE — CONSULTS
Nutrition Note    Consult received. Pt has fish allergy. Writer verified Ensure [employer] allergens do not include fish.    DAVID Thomas, RDN, LD  Registered Dietitian  [facility]  [phone]

## 2024-06-21 NOTE — PROGRESS NOTES
acetaminophen    Data:     Past Medical History:   has a past medical history of Hyperlipidemia and Hypertension.    Social History:   reports that he has never smoked. He has never used smokeless tobacco. He reports that he does not drink alcohol and does not use drugs.     Family History: No family history on file.    Vitals:  BP (!) 144/65   Pulse 58   Temp 97.5 °F (36.4 °C) (Oral)   Resp 20   Ht 1.778 m (5' 10\")   Wt 75 kg (165 lb 5.5 oz)   SpO2 93%   BMI 23.72 kg/m²   Temp (24hrs), Av °F (36.7 °C), Min:97.3 °F (36.3 °C), Max:98.9 °F (37.2 °C)    Recent Labs     24  0748 24  1110 24  1626 24  2031   POCGLU 122* 199* 136* 164*       I/O (24Hr):  No intake or output data in the 24 hours ending 24 0804      Labs:  Hematology:  Recent Labs     24  0723 24  1807 24  0643   WBC 2.6* 3.5 2.2*   RBC 3.46* 3.69* 3.52*   HGB 10.3* 10.7* 10.3*   HCT 29.9* 31.9* 30.3*   MCV 86.5 86.5 86.2   MCH 29.8 29.0 29.3   MCHC 34.4 33.6 34.0   RDW 13.9 14.3 13.9   PLT 19* 12* 11*   MPV 10.8 11.1 11.7     Chemistry:  Recent Labs     24  0601 24  0723 24  0643   * 145* 147*   K 4.2 4.5 4.4   * 116* 116*   CO2 18* 20 20   GLUCOSE 151* 131* 139*   BUN 51* 51* 45*   CREATININE 1.6* 1.5* 1.3*   ANIONGAP 11 9 11   LABGLOM 45* 49* 58*   CALCIUM 7.7* 7.9* 8.0*     Recent Labs     24  1716 24  2104 24  0748 24  1110 24  1626 24   POCGLU 147* 263* 122* 199* 136* 164*     ABG:No results found for: \"POCPH\", \"PHART\", \"PH\", \"POCPCO2\", \"ALZ0KHN\", \"PCO2\", \"POCPO2\", \"PO2ART\", \"PO2\", \"POCHCO3\", \"TOI0TCU\", \"HCO3\", \"NBEA\", \"PBEA\", \"BEART\", \"BE\", \"THGBART\", \"THB\", \"NRP7RMU\", \"SVHT6YXV\", \"Y9BXILIT\", \"O2SAT\", \"FIO2\"  Lab Results   Component Value Date/Time    SPECIAL  10/23/2015 10:05 AM     LEFT ARM 20 ML Performed at [facility], [address] [place], [place] [address]  10/23/2015 10:05 AM     Lab Results   Component Value Date/Time    CULTURE NO GROWTH 6 DAYS 10/23/2015 10:05 AM    CULTURE  10/23/2015 10:05 AM     [facility] [address] [place], [place] [address] [phone]       Radiology:  CT CHEST ABDOMEN PELVIS WO CONTRAST Additional Contrast? None    Result Date: 6/15/2024  1. Multiple enlarged lymph nodes seen in the middle mediastinum and bandar, multiple which are partially calcified. 2. Several subcentimeter noncalcified nodules scattered throughout the lungs. Metastatic disease cannot be excluded.  Mild bibasal bronchiectasis more notably on the right. 3. Splenomegaly. 4. Cholelithiasis. 5. 6 mm nonobstructing left renal stone. 6. Prostatic enlargement and calculi. 7. Nonspecific stranding seen in the mesentery most notably in the retroperitoneum. Multiple calcified mesenteric lymph nodes. 8. Trace pericardial effusion. 9. Limited exam due to lack of IV contrast       Physical Examination:       General appearance:  alert, cooperative and no distress  Mental Status:  oriented to person, place and time and normal affect  Lungs:  clear to auscultation bilaterally, normal effort  Heart:  regular rate and rhythm, no murmur  Abdomen:  soft, nontender, nondistended, normal bowel sounds, no masses, hepatomegaly, splenomegaly  Extremities:  no edema, redness, tenderness in the calves  Skin: Small areas of petechiae on right neck, arms, right leg    Assessment:     Hospital Problems             Last Modified POA    * (Principal) Thrombocytopenia (HCC) 6/17/2024 Yes    Primary hypertension 6/15/2024 Yes    Pure hypercholesterolemia 6/15/2024 Yes    CKD stage 3b, GFR 30-44 ml/min ([facility]) 6/15/2024 Yes    H/O: stroke 6/18/2024 Yes    PVC's (premature ventricular contractions) 6/18/2024 Yes    Lung nodules 6/18/2024 Yes    Lymphadenopathy 6/18/2024 Yes       Plan:     Severe thrombocytopenia-concern for ITP, patient given IVIG, on Decadron and given Immune globulin today by Hem/onc,

## 2024-06-21 NOTE — PROGRESS NOTES
Today's Date: 6/21/2024  Patient Name: Bruno Morales  Date of admission: 6/15/2024  3:29 PM  Patient's age: 73 y.o., 1950  Admission Dx: Bigeminy [I49.8]  Thrombocytopenia (HCC) [D69.6]    Reason for Consult: management recommendations  Requesting Physician: Rk Be MD    CHIEF COMPLAINT: Severe thrombocytopenia      Interval history:    Patient seen and examined  Labs vitals reviewed  The patient denies any active bleeding but his platelets are dropping.  Finishing the last dose of steroid today.      BRIEF CASE HISTORY:    The patient is a 73 y.o.   male who is admitted to the hospital for petechiae and was found to have severe thrombocytopenia which is new onset.  The patient describes fatigue, mucosal bleeding from the mouth.  No hemorrhagic diatheses otherwise.  No hematuria or blood in the stool.  Upon evaluation, platelets were down to 30,000.  Evaluation of blood smear was unremarkable.  No evidence of hemolysis based on basic workup.  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  Past Medical History:   has a past medical history of Hyperlipidemia and Hypertension.    Past Surgical History:   has a past surgical history that includes Tonsillectomy.     Medications:    Reviewed in Epic     Allergies:  Asa [aspirin], Fish-derived products, Penicillins, and Tetracyclines & related    Social History:   reports that he has never smoked. He has never used smokeless tobacco. He reports that he does not drink alcohol and does not use drugs.     Family History: family history is not on file.    REVIEW OF SYSTEMS:    Constitutional: No fever or chills. No night sweats, no weight loss   Eyes: No eye discharge, double vision, or eye pain   HEENT: negative for sore mouth, sore throat, hoarseness and voice change   Respiratory: negative for cough , sputum, dyspnea, wheezing, hemoptysis, chest pain   Cardiovascular: negative for chest pain, dyspnea, palpitations,

## 2024-06-21 NOTE — PLAN OF CARE
Problem: Discharge Planning  Goal: Discharge to home or other facility with appropriate resources  6/20/2024 2125 by Nataly Perez, RN  Outcome: Progressing  Flowsheets (Taken 6/20/2024 2125)  Discharge to home or other facility with appropriate resources:   Identify barriers to discharge with patient and caregiver   Identify discharge learning needs (meds, wound care, etc)   Refer to discharge planning if patient needs post-hospital services based on physician order or complex needs related to functional status, cognitive ability or social support system     Problem: Safety - Adult  Goal: Free from fall injury  6/20/2024 2125 by Nataly Perez, RN  Outcome: Progressing  Flowsheets (Taken 6/20/2024 2125)  Free From Fall Injury:   Instruct family/caregiver on patient safety   Based on caregiver fall risk screen, instruct family/caregiver to ask for assistance with transferring infant if caregiver noted to have fall risk factors

## 2024-06-21 NOTE — PLAN OF CARE
Problem: Discharge Planning  Goal: Discharge to home or other facility with appropriate resources  Outcome: Progressing  Flowsheets (Taken 6/21/2024 0855)  Discharge to home or other facility with appropriate resources: Identify barriers to discharge with patient and caregiver     Problem: Skin/Tissue Integrity  Goal: Absence of new skin breakdown  Description: 1.  Monitor for areas of redness and/or skin breakdown  2.  Assess vascular access sites hourly  3.  Every 4-6 hours minimum:  Change oxygen saturation probe site  4.  Every 4-6 hours:  If on nasal continuous positive airway pressure, respiratory therapy assess nares and determine need for appliance change or resting period.  Outcome: Progressing     Problem: ABCDS Injury Assessment  Goal: Absence of physical injury  Outcome: Progressing     Problem: Safety - Adult  Goal: Free from fall injury  Outcome: Progressing     Problem: Neurosensory - Adult  Goal: Achieves stable or improved neurological status  Outcome: Progressing  Flowsheets (Taken 6/21/2024 0855)  Achieves stable or improved neurological status: Assess for and report changes in neurological status  Goal: Achieves maximal functionality and self care  Outcome: Progressing  Flowsheets (Taken 6/21/2024 0855)  Achieves maximal functionality and self care: Monitor swallowing and airway patency with patient fatigue and changes in neurological status     Problem: Respiratory - Adult  Goal: Achieves optimal ventilation and oxygenation  Outcome: Progressing  Flowsheets (Taken 6/21/2024 0855)  Achieves optimal ventilation and oxygenation: Assess for changes in respiratory status     Problem: Cardiovascular - Adult  Goal: Maintains optimal cardiac output and hemodynamic stability  Outcome: Progressing  Flowsheets (Taken 6/21/2024 0855)  Maintains optimal cardiac output and hemodynamic stability: Monitor blood pressure and heart rate  Goal: Absence of cardiac dysrhythmias or at baseline  Outcome:  Progressing  Flowsheets (Taken 6/21/2024 0855)  Absence of infection at discharge: Assess and monitor for signs and symptoms of infection     Problem: Metabolic/Fluid and Electrolytes - Adult  Goal: Electrolytes maintained within normal limits  Outcome: Progressing  Flowsheets (Taken 6/21/2024 0855)  Electrolytes maintained within normal limits: Administer electrolyte replacement as ordered  Goal: Hemodynamic stability and optimal renal function maintained  Outcome: Progressing  Flowsheets (Taken 6/21/2024 0855)  Hemodynamic stability and optimal renal function maintained: Monitor labs and assess for signs and symptoms of volume excess or deficit     Problem: Hematologic - Adult  Goal: Maintains hematologic stability  Outcome: Progressing  Flowsheets (Taken 6/21/2024 0855)  Maintains hematologic stability: Assess for signs and symptoms of bleeding or hemorrhage     Problem: Nutrition Deficit:  Goal: Optimize nutritional status  Outcome: Progressing     Problem: Pain  Goal: Verbalizes/displays adequate comfort level or baseline comfort level  Outcome: Progressing  Flowsheets (Taken 6/21/2024 0851)  Verbalizes/displays adequate comfort level or baseline comfort level: Encourage patient to monitor pain and request assistance   Patient is pain and injury free. Patient is progressing towards goals

## 2024-06-22 VITALS
DIASTOLIC BLOOD PRESSURE: 63 MMHG | BODY MASS INDEX: 23.67 KG/M2 | WEIGHT: 165.34 LBS | OXYGEN SATURATION: 97 % | TEMPERATURE: 97.5 F | RESPIRATION RATE: 16 BRPM | HEART RATE: 65 BPM | SYSTOLIC BLOOD PRESSURE: 166 MMHG | HEIGHT: 70 IN

## 2024-06-22 PROBLEM — D61.818 PANCYTOPENIA (HCC): Status: ACTIVE | Noted: 2024-06-22

## 2024-06-22 LAB
ANION GAP SERPL CALCULATED.3IONS-SCNC: 8 MMOL/L (ref 9–17)
BASOPHILS # BLD: 0 K/UL (ref 0–0.2)
BASOPHILS NFR BLD: 0 % (ref 0–2)
BUN SERPL-MCNC: 50 MG/DL (ref 8–23)
CALCIUM SERPL-MCNC: 7.7 MG/DL (ref 8.6–10.4)
CHLORIDE SERPL-SCNC: 112 MMOL/L (ref 98–107)
CO2 SERPL-SCNC: 23 MMOL/L (ref 20–31)
CREAT SERPL-MCNC: 1.3 MG/DL (ref 0.7–1.2)
EOSINOPHIL # BLD: 0 K/UL (ref 0–0.4)
EOSINOPHILS RELATIVE PERCENT: 0 % (ref 1–4)
ERYTHROCYTE [DISTWIDTH] IN BLOOD BY AUTOMATED COUNT: 14 % (ref 12.5–15.4)
FLOW CYTOMETRY BL: NORMAL
GFR, ESTIMATED: 58 ML/MIN/1.73M2
GLUCOSE BLD-MCNC: 118 MG/DL (ref 75–110)
GLUCOSE BLD-MCNC: 164 MG/DL (ref 75–110)
GLUCOSE SERPL-MCNC: 135 MG/DL (ref 70–99)
HCT VFR BLD AUTO: 29.3 % (ref 41–53)
HGB BLD-MCNC: 9.9 G/DL (ref 13.5–17.5)
LYMPHOCYTES NFR BLD: 0.3 K/UL (ref 1–4.8)
LYMPHOCYTES RELATIVE PERCENT: 15 % (ref 24–44)
MCH RBC QN AUTO: 29.2 PG (ref 26–34)
MCHC RBC AUTO-ENTMCNC: 33.7 G/DL (ref 31–37)
MCV RBC AUTO: 86.6 FL (ref 80–100)
METAMYELOCYTES ABSOLUTE COUNT: 0.04 K/UL
METAMYELOCYTES: 2 %
MONOCYTES NFR BLD: 0.02 K/UL (ref 0.1–0.8)
MONOCYTES NFR BLD: 1 % (ref 1–7)
MORPHOLOGY: NORMAL
NEUTROPHILS NFR BLD: 82 % (ref 36–66)
NEUTS SEG NFR BLD: 1.64 K/UL (ref 1.8–7.7)
PLATELET # BLD AUTO: 46 K/UL (ref 140–450)
PMV BLD AUTO: 11.7 FL (ref 6–12)
POTASSIUM SERPL-SCNC: 4.2 MMOL/L (ref 3.7–5.3)
RBC # BLD AUTO: 3.39 M/UL (ref 4.5–5.9)
SODIUM SERPL-SCNC: 143 MMOL/L (ref 135–144)
WBC OTHER # BLD: 2 K/UL (ref 3.5–11)

## 2024-06-22 PROCEDURE — 85025 COMPLETE CBC W/AUTO DIFF WBC: CPT

## 2024-06-22 PROCEDURE — 6370000000 HC RX 637 (ALT 250 FOR IP): Performed by: INTERNAL MEDICINE

## 2024-06-22 PROCEDURE — 99239 HOSP IP/OBS DSCHRG MGMT >30: CPT | Performed by: INTERNAL MEDICINE

## 2024-06-22 PROCEDURE — 6360000002 HC RX W HCPCS: Performed by: STUDENT IN AN ORGANIZED HEALTH CARE EDUCATION/TRAINING PROGRAM

## 2024-06-22 PROCEDURE — 36415 COLL VENOUS BLD VENIPUNCTURE: CPT

## 2024-06-22 PROCEDURE — 82947 ASSAY GLUCOSE BLOOD QUANT: CPT

## 2024-06-22 PROCEDURE — 80048 BASIC METABOLIC PNL TOTAL CA: CPT

## 2024-06-22 PROCEDURE — 6370000000 HC RX 637 (ALT 250 FOR IP): Performed by: STUDENT IN AN ORGANIZED HEALTH CARE EDUCATION/TRAINING PROGRAM

## 2024-06-22 PROCEDURE — 2580000003 HC RX 258: Performed by: STUDENT IN AN ORGANIZED HEALTH CARE EDUCATION/TRAINING PROGRAM

## 2024-06-22 RX ORDER — DILTIAZEM HYDROCHLORIDE 60 MG/1
60 TABLET, FILM COATED ORAL EVERY 8 HOURS SCHEDULED
Qty: 120 TABLET | Refills: 1 | Status: SHIPPED | OUTPATIENT
Start: 2024-06-22

## 2024-06-22 RX ORDER — OMEPRAZOLE 40 MG/1
40 CAPSULE, DELAYED RELEASE ORAL DAILY
Qty: 30 CAPSULE | Refills: 1 | Status: SHIPPED | OUTPATIENT
Start: 2024-06-22

## 2024-06-22 RX ADMIN — LISINOPRIL 40 MG: 20 TABLET ORAL at 09:08

## 2024-06-22 RX ADMIN — DILTIAZEM HYDROCHLORIDE 60 MG: 30 TABLET, FILM COATED ORAL at 13:17

## 2024-06-22 RX ADMIN — FENOFIBRATE 160 MG: 160 TABLET ORAL at 09:09

## 2024-06-22 RX ADMIN — CARVEDILOL 25 MG: 12.5 TABLET, FILM COATED ORAL at 09:08

## 2024-06-22 RX ADMIN — SODIUM CHLORIDE, PRESERVATIVE FREE 10 ML: 5 INJECTION INTRAVENOUS at 09:10

## 2024-06-22 RX ADMIN — LANSOPRAZOLE 30 MG: 15 TABLET, ORALLY DISINTEGRATING ORAL at 05:56

## 2024-06-22 RX ADMIN — BRIMONIDINE TARTRATE 1 DROP: 2 SOLUTION OPHTHALMIC at 09:10

## 2024-06-22 RX ADMIN — DEXAMETHASONE 40 MG: 4 TABLET ORAL at 09:09

## 2024-06-22 RX ADMIN — ATORVASTATIN CALCIUM 20 MG: 20 TABLET, FILM COATED ORAL at 09:08

## 2024-06-22 ASSESSMENT — PAIN SCALES - GENERAL: PAINLEVEL_OUTOF10: 0

## 2024-06-22 NOTE — PLAN OF CARE
Problem: Discharge Planning  Goal: Discharge to home or other facility with appropriate resources  6/21/2024 2016 by Nataly Perez, RN  Outcome: Progressing  Flowsheets (Taken 6/21/2024 2016)  Discharge to home or other facility with appropriate resources:   Identify barriers to discharge with patient and caregiver   Arrange for needed discharge resources and transportation as appropriate   Identify discharge learning needs (meds, wound care, etc)     Problem: ABCDS Injury Assessment  Goal: Absence of physical injury  6/21/2024 2016 by Nataly Perez, RN  Outcome: Progressing  Flowsheets (Taken 6/21/2024 2016)  Absence of Physical Injury: Implement safety measures based on patient assessment

## 2024-06-22 NOTE — PLAN OF CARE
Problem: Discharge Planning  Goal: Discharge to home or other facility with appropriate resources  Outcome: Adequate for Discharge  Flowsheets (Taken 6/22/2024 0803)  Discharge to home or other facility with appropriate resources:   Identify barriers to discharge with patient and caregiver   Identify discharge learning needs (meds, wound care, etc)   Arrange for needed discharge resources and transportation as appropriate     Problem: Skin/Tissue Integrity  Goal: Absence of new skin breakdown  Description: 1.  Monitor for areas of redness and/or skin breakdown  2.  Assess vascular access sites hourly  3.  Every 4-6 hours minimum:  Change oxygen saturation probe site  4.  Every 4-6 hours:  If on nasal continuous positive airway pressure, respiratory therapy assess nares and determine need for appliance change or resting period.  Outcome: Adequate for Discharge     Problem: ABCDS Injury Assessment  Goal: Absence of physical injury  Outcome: Adequate for Discharge  Flowsheets (Taken 6/22/2024 0908)  Absence of Physical Injury: Implement safety measures based on patient assessment     Problem: Safety - Adult  Goal: Free from fall injury  Outcome: Adequate for Discharge     Problem: Neurosensory - Adult  Goal: Achieves stable or improved neurological status  Outcome: Adequate for Discharge  Flowsheets (Taken 6/22/2024 0803)  Achieves stable or improved neurological status:   Assess for and report changes in neurological status   Initiate measures to prevent increased intracranial pressure   Monitor temperature, glucose, and sodium. Initiate appropriate interventions as ordered  Goal: Achieves maximal functionality and self care  Outcome: Adequate for Discharge  Flowsheets (Taken 6/22/2024 0803)  Achieves maximal functionality and self care: Encourage and assist patient to increase activity and self care with guidance from physical therapy/occupational therapy     Problem: Respiratory - Adult  Goal: Achieves optimal

## 2024-06-22 NOTE — DISCHARGE SUMMARY
[facility]  Office: [phone]  Phani Andino DO, Carlos A Hernandez DO, Sandor Rivera DO, Dre Marroquin DO, Cecelia Murillo MD, Rosie Robbins MD, Castro Hope MD, Becka Sharma MD,  Wolf Reardon MD, Edna Truong MD, Jamee Padilla MD,  Bia Weir DO, Jacob Riley MD, Rk Be MD, Diego Andino DO, Viviana Marroquin MD,  Jesse Mahan DO, Lauryn Rose MD, Paula Ramsay MD, Alyssia Henley MD, Chari Pastor MD,  Reynold Lennon MD, Luca Eckert MD, Speedy Durham MD, Devyn Moreno MD, Heber Valenzuela MD, David Paiz MD, Chava Nicole DO, Silverio Barrett DO, Lana Ahuja MD,  Sebastian Braswell MD, Shirley Waterhouse, CNP,  Darlyn Savage CNP, Eagle Yoon, CNP,  Luzmaria Mitchell, DNP, Tonja Vizcaino, CNP, Tiff Hamilton, CNP, Glenna Taylor CNP, Lili Cool, CNP, Amina Harrell, PA-C, Cheyenne Parker PA-C, Letitia Lynch, CNP, Jacque Lorenzo, CNP, Violette Romo, CNP, Kelly Minor, CNP, Marilynn Acuña, CNP, Lluvia Anand, CNS, Nerissa Resendiz, CNP, Ashley Benítez CNP, Tracy Schwab, CNP         [facility]   IN-PATIENT SERVICE   [facility]    Discharge Summary     Patient ID: Bruno Morales  :  1950   MRN: [id number]     ACCOUNT:  [id number]   Patient's PCP: Margarette Vallejo MD  Admit Date: 6/15/2024   Discharge Date: 2024      Length of Stay: 7  Code Status:  Full Code  Admitting Physician: Rk Be MD  Discharge Physician: Rosie Robbins MD     Active Discharge Diagnoses:     Hospital Problem Lists:  Principal Problem:    Thrombocytopenia (HCC)  Active Problems:    Primary hypertension    Pure hypercholesterolemia    CKD stage 3b, GFR 30-44 ml/min (HCC)    H/O: stroke    PVC's (premature ventricular contractions)    Lung nodules    Lymphadenopathy    Pancytopenia (HCC)  Resolved Problems:    * No resolved hospital problems. *      Admission Condition:  poor     Discharged Condition: fair    Hospital Stay:     Hospital       brimonidine 0.1 % Soln  Commonly known as: ALPHAGAN P     carvedilol 12.5 MG tablet  Commonly known as: COREG     fenofibrate 160 MG tablet  Commonly known as: TRIGLIDE     lisinopril 40 MG tablet  Commonly known as: PRINIVIL;ZESTRIL     loratadine 10 MG capsule  Commonly known as: CLARITIN     simvastatin 40 MG tablet  Commonly known as: ZOCOR            STOP taking these medications      clopidogrel 75 MG tablet  Commonly known as: PLAVIX     valsartan-hydroCHLOROthiazide 320-25 MG per tablet  Commonly known as: DIOVAN-HCT               Where to Get Your Medications        These medications were sent to [id number] - [place], [place] - [facility] [phone] - F [phone]  [facility] [address]      Phone: [phone]   dilTIAZem 60 MG tablet  omeprazole 40 MG delayed release capsule         Discharge Procedure Orders   CBC with Auto Differential   Standing Status: Future Standing Exp. Date: 06/22/25   Order Comments: Copy results to Dr. Jaime       Time Spent on discharge is  32 mins in patient examination, evaluation, counseling as well as medication reconciliation, prescriptions for required medications, discharge plan and follow up.    Electronically signed by   Rosie Robbins MD  6/22/2024  11:52 AM      Thank you Margarette Cruz MD for the opportunity to be involved in this patient's care.

## 2024-06-22 NOTE — PROGRESS NOTES
acetaminophen **OR** acetaminophen    Data:     Past Medical History:   has a past medical history of Hyperlipidemia and Hypertension.    Social History:   reports that he has never smoked. He has never used smokeless tobacco. He reports that he does not drink alcohol and does not use drugs.     Family History: No family history on file.    Vitals:  BP (!) 168/74   Pulse (!) 42   Temp 97.8 °F (36.6 °C) (Oral)   Resp 16   Ht 1.778 m (5' 10\")   Wt 75 kg (165 lb 5.5 oz)   SpO2 94%   BMI 23.72 kg/m²   Temp (24hrs), Av °F (36.7 °C), Min:97.7 °F (36.5 °C), Max:98.2 °F (36.8 °C)    Recent Labs     24  1129 24  1615 24  2038 24  0750   POCGLU 197* 142* 169* 118*       I/O (24Hr):    Intake/Output Summary (Last 24 hours) at 2024 0759  Last data filed at 2024 1243  Gross per 24 hour   Intake 360 ml   Output --   Net 360 ml         Labs:  Hematology:  Recent Labs     24  1807 24  0643 24  0622   WBC 3.5 2.2* 2.0*   RBC 3.69* 3.52* 3.39*   HGB 10.7* 10.3* 9.9*   HCT 31.9* 30.3* 29.3*   MCV 86.5 86.2 86.6   MCH 29.0 29.3 29.2   MCHC 33.6 34.0 33.7   RDW 14.3 13.9 14.0   PLT 12* 11* 46*   MPV 11.1 11.7 11.7     Chemistry:  Recent Labs     24  0723 24  0643 24  06   * 147* 143   K 4.5 4.4 4.2   * 116* 112*   CO2    GLUCOSE 131* 139* 135*   BUN 51* 45* 50*   CREATININE 1.5* 1.3* 1.3*   ANIONGAP 9 11 8*   LABGLOM 49* 58* 58*   CALCIUM 7.9* 8.0* 7.7*     Recent Labs     24  0826 24  1129 24  1615 24  0750   POCGLU 164* 124* 197* 142* 169* 118*     ABG:No results found for: \"POCPH\", \"PHART\", \"PH\", \"POCPCO2\", \"RML5ZMN\", \"PCO2\", \"POCPO2\", \"PO2ART\", \"PO2\", \"POCHCO3\", \"ZDW1KJB\", \"HCO3\", \"NBEA\", \"PBEA\", \"BEART\", \"BE\", \"THGBART\", \"THB\", \"HNX9RYW\", \"TGWF8AZD\", \"Y3AAMECQ\", \"O2SAT\", \"FIO2\"  Lab Results   Component Value Date/Time    SPECIAL  10/23/2015 10:05 AM     LEFT ARM 20 ML Performed  at [facility], [id number]    [place] [address] 10/23/2015 10:05 AM     Lab Results   Component Value Date/Time    CULTURE NO GROWTH 6 DAYS 10/23/2015 10:05 AM    CULTURE  10/23/2015 10:05 AM     [address] [address] [phone]       Radiology:  CT CHEST ABDOMEN PELVIS WO CONTRAST Additional Contrast? None    Result Date: 6/15/2024  1. Multiple enlarged lymph nodes seen in the middle mediastinum and bandar, multiple which are partially calcified. 2. Several subcentimeter noncalcified nodules scattered throughout the lungs. Metastatic disease cannot be excluded.  Mild bibasal bronchiectasis more notably on the right. 3. Splenomegaly. 4. Cholelithiasis. 5. 6 mm nonobstructing left renal stone. 6. Prostatic enlargement and calculi. 7. Nonspecific stranding seen in the mesentery most notably in the retroperitoneum. Multiple calcified mesenteric lymph nodes. 8. Trace pericardial effusion. 9. Limited exam due to lack of IV contrast       Physical Examination:       General appearance:  alert, cooperative and no distress  Mental Status:  oriented to person, place and time and normal affect  Lungs:  clear to auscultation bilaterally, normal effort  Heart:  regular rate and rhythm, no murmur  Abdomen:  soft, nontender, nondistended, normal bowel sounds, no masses, hepatomegaly, splenomegaly  Extremities:  no edema, redness, tenderness in the calves  Skin: Small areas of petechiae on right neck, arms, right leg    Assessment:     Hospital Problems             Last Modified POA    * (Principal) Thrombocytopenia (HCC) 6/17/2024 Yes    Primary hypertension 6/15/2024 Yes    Pure hypercholesterolemia 6/15/2024 Yes    CKD stage 3b, GFR 30-44 ml/min (HCC) 6/15/2024 Yes    H/O: stroke 6/18/2024 Yes    PVC's (premature ventricular contractions) 6/18/2024 Yes    Lung nodules 6/18/2024 Yes    Lymphadenopathy 6/18/2024 Yes       Plan:     Severe

## 2024-06-24 ENCOUNTER — HOSPITAL ENCOUNTER (OUTPATIENT)
Age: 74
Discharge: HOME | End: 2024-06-24
Payer: MEDICARE

## 2024-06-24 DIAGNOSIS — D69.6 THROMBOCYTOPENIA (HCC): ICD-10-CM

## 2024-06-24 LAB
BASOPHILS # BLD: 0 K/UL (ref 0–0.2)
BASOPHILS NFR BLD: 0 % (ref 0–2)
EOSINOPHIL # BLD: 0 K/UL (ref 0–0.4)
EOSINOPHILS RELATIVE PERCENT: 0 % (ref 1–4)
ERYTHROCYTE [DISTWIDTH] IN BLOOD BY AUTOMATED COUNT: 13.7 % (ref 11.8–14.4)
HCT VFR BLD AUTO: 34 % (ref 40.7–50.3)
HGB BLD-MCNC: 11 G/DL (ref 13–17)
IMM GRANULOCYTES # BLD AUTO: 0.2 K/UL (ref 0–0.3)
IMM GRANULOCYTES NFR BLD: 3 %
LYMPHOCYTES NFR BLD: 0.46 K/UL (ref 1–4.8)
LYMPHOCYTES RELATIVE PERCENT: 7 % (ref 24–44)
MCH RBC QN AUTO: 28.7 PG (ref 25.2–33.5)
MCHC RBC AUTO-ENTMCNC: 32.4 G/DL (ref 28.4–34.8)
MCV RBC AUTO: 88.8 FL (ref 82.6–102.9)
MONOCYTES NFR BLD: 0.39 K/UL (ref 0.1–0.8)
MONOCYTES NFR BLD: 6 % (ref 1–7)
MORPHOLOGY: NORMAL
NEUTROPHILS NFR BLD: 84 % (ref 36–66)
NEUTS SEG NFR BLD: 5.45 K/UL (ref 1.8–7.7)
NRBC BLD-RTO: 0 PER 100 WBC
PLATELET # BLD AUTO: ABNORMAL K/UL (ref 138–453)
PLATELET, FLUORESCENCE: ABNORMAL K/UL (ref 138–453)
RBC # BLD AUTO: 3.83 M/UL (ref 4.21–5.77)
WBC OTHER # BLD: 6.5 K/UL (ref 3.5–11.3)

## 2024-06-24 PROCEDURE — 85055 RETICULATED PLATELET ASSAY: CPT

## 2024-06-24 PROCEDURE — 85025 COMPLETE CBC W/AUTO DIFF WBC: CPT

## 2024-06-24 PROCEDURE — 36415 COLL VENOUS BLD VENIPUNCTURE: CPT

## 2024-07-01 ENCOUNTER — HOSPITAL ENCOUNTER (INPATIENT)
Age: 74
LOS: 1 days | Discharge: ANOTHER HOSPITAL | DRG: 813 | End: 2024-07-02
Attending: EMERGENCY MEDICINE | Admitting: STUDENT IN AN ORGANIZED HEALTH CARE EDUCATION/TRAINING PROGRAM
Payer: MEDICARE

## 2024-07-01 DIAGNOSIS — D69.6 THROMBOCYTOPENIA (HCC): Primary | ICD-10-CM

## 2024-07-01 PROCEDURE — 30233S1 TRANSFUSION OF NONAUTOLOGOUS GLOBULIN INTO PERIPHERAL VEIN, PERCUTANEOUS APPROACH: ICD-10-PCS | Performed by: INTERNAL MEDICINE

## 2024-07-01 PROCEDURE — 99285 EMERGENCY DEPT VISIT HI MDM: CPT

## 2024-07-01 PROCEDURE — 96365 THER/PROPH/DIAG IV INF INIT: CPT

## 2024-07-01 PROCEDURE — 30233R1 TRANSFUSION OF NONAUTOLOGOUS PLATELETS INTO PERIPHERAL VEIN, PERCUTANEOUS APPROACH: ICD-10-PCS | Performed by: INTERNAL MEDICINE

## 2024-07-01 ASSESSMENT — PAIN - FUNCTIONAL ASSESSMENT: PAIN_FUNCTIONAL_ASSESSMENT: NONE - DENIES PAIN

## 2024-07-02 ENCOUNTER — HOSPITAL ENCOUNTER (INPATIENT)
Age: 74
LOS: 3 days | Discharge: HOME | DRG: 813 | End: 2024-07-05
Attending: STUDENT IN AN ORGANIZED HEALTH CARE EDUCATION/TRAINING PROGRAM | Admitting: STUDENT IN AN ORGANIZED HEALTH CARE EDUCATION/TRAINING PROGRAM
Payer: MEDICARE

## 2024-07-02 ENCOUNTER — APPOINTMENT (OUTPATIENT)
Dept: CT IMAGING | Age: 74
DRG: 813 | End: 2024-07-02
Payer: MEDICARE

## 2024-07-02 VITALS
WEIGHT: 155 LBS | DIASTOLIC BLOOD PRESSURE: 86 MMHG | HEART RATE: 89 BPM | RESPIRATION RATE: 17 BRPM | HEIGHT: 70 IN | TEMPERATURE: 97.9 F | SYSTOLIC BLOOD PRESSURE: 168 MMHG | BODY MASS INDEX: 22.19 KG/M2 | OXYGEN SATURATION: 97 %

## 2024-07-02 DIAGNOSIS — D69.3 ACUTE IDIOPATHIC THROMBOCYTOPENIC PURPURA (HCC): Primary | ICD-10-CM

## 2024-07-02 PROBLEM — R16.1 SPLENOMEGALY: Status: ACTIVE | Noted: 2024-07-02

## 2024-07-02 PROBLEM — R59.0 HILAR ADENOPATHY: Status: ACTIVE | Noted: 2024-07-02

## 2024-07-02 PROBLEM — D64.9 ANEMIA, NORMOCYTIC NORMOCHROMIC: Status: ACTIVE | Noted: 2024-07-02

## 2024-07-02 PROBLEM — R59.0 MEDIASTINAL ADENOPATHY: Status: ACTIVE | Noted: 2024-07-02

## 2024-07-02 PROBLEM — D69.6 THROMBOCYTOPENIA (HCC): Status: ACTIVE | Noted: 2024-07-02

## 2024-07-02 LAB
BASOPHILS # BLD: 0 K/UL (ref 0–0.2)
BASOPHILS NFR BLD: 0 % (ref 0–2)
EOSINOPHIL # BLD: 0 K/UL (ref 0–0.4)
EOSINOPHIL # BLD: 0 K/UL (ref 0–0.4)
EOSINOPHIL # BLD: 0.06 K/UL (ref 0–0.4)
EOSINOPHILS RELATIVE PERCENT: 0 % (ref 1–4)
EOSINOPHILS RELATIVE PERCENT: 0 % (ref 1–4)
EOSINOPHILS RELATIVE PERCENT: 1 % (ref 1–4)
ERYTHROCYTE [DISTWIDTH] IN BLOOD BY AUTOMATED COUNT: 14.2 % (ref 12.5–15.4)
ERYTHROCYTE [DISTWIDTH] IN BLOOD BY AUTOMATED COUNT: 14.3 % (ref 12.5–15.4)
ERYTHROCYTE [DISTWIDTH] IN BLOOD BY AUTOMATED COUNT: 14.3 % (ref 12.5–15.4)
HCT VFR BLD AUTO: 29.2 % (ref 41–53)
HCT VFR BLD AUTO: 32 % (ref 41–53)
HCT VFR BLD AUTO: 33.4 % (ref 41–53)
HGB BLD-MCNC: 10.7 G/DL (ref 13.5–17.5)
HGB BLD-MCNC: 11.3 G/DL (ref 13.5–17.5)
HGB BLD-MCNC: 9.7 G/DL (ref 13.5–17.5)
LYMPHOCYTES NFR BLD: 0.18 K/UL (ref 1–4.8)
LYMPHOCYTES NFR BLD: 0.22 K/UL (ref 1–4.8)
LYMPHOCYTES NFR BLD: 0.31 K/UL (ref 1–4.8)
LYMPHOCYTES RELATIVE PERCENT: 4 % (ref 24–44)
LYMPHOCYTES RELATIVE PERCENT: 5 % (ref 24–44)
LYMPHOCYTES RELATIVE PERCENT: 9 % (ref 24–44)
MCH RBC QN AUTO: 28.7 PG (ref 26–34)
MCH RBC QN AUTO: 28.8 PG (ref 26–34)
MCH RBC QN AUTO: 29.1 PG (ref 26–34)
MCHC RBC AUTO-ENTMCNC: 33.1 G/DL (ref 31–37)
MCHC RBC AUTO-ENTMCNC: 33.4 G/DL (ref 31–37)
MCHC RBC AUTO-ENTMCNC: 33.9 G/DL (ref 31–37)
MCV RBC AUTO: 85.8 FL (ref 80–100)
MCV RBC AUTO: 86.4 FL (ref 80–100)
MCV RBC AUTO: 86.7 FL (ref 80–100)
METAMYELOCYTES ABSOLUTE COUNT: 0.03 K/UL
METAMYELOCYTES: 1 %
MONOCYTES NFR BLD: 0 % (ref 1–7)
MONOCYTES NFR BLD: 0 K/UL (ref 0.1–0.8)
MONOCYTES NFR BLD: 0.07 K/UL (ref 0.1–0.8)
MONOCYTES NFR BLD: 0.17 K/UL (ref 0.1–0.8)
MONOCYTES NFR BLD: 2 % (ref 1–7)
MONOCYTES NFR BLD: 3 % (ref 1–7)
MORPHOLOGY: NORMAL
MYELOCYTES ABSOLUTE COUNT: 0.07 K/UL
MYELOCYTES: 2 %
NEUTROPHILS NFR BLD: 86 % (ref 36–66)
NEUTROPHILS NFR BLD: 92 % (ref 36–66)
NEUTROPHILS NFR BLD: 95 % (ref 36–66)
NEUTS SEG NFR BLD: 2.92 K/UL (ref 1.8–7.7)
NEUTS SEG NFR BLD: 3.42 K/UL (ref 1.8–7.7)
NEUTS SEG NFR BLD: 5.15 K/UL (ref 1.8–7.7)
PLATELET # BLD AUTO: 10 K/UL (ref 140–450)
PLATELET # BLD AUTO: 3 K/UL (ref 140–450)
PLATELET # BLD AUTO: 4 K/UL (ref 140–450)
PMV BLD AUTO: 11.2 FL (ref 6–12)
PMV BLD AUTO: 12.5 FL (ref 6–12)
PMV BLD AUTO: 12.5 FL (ref 6–12)
RBC # BLD AUTO: 3.37 M/UL (ref 4.5–5.9)
RBC # BLD AUTO: 3.71 M/UL (ref 4.5–5.9)
RBC # BLD AUTO: 3.9 M/UL (ref 4.5–5.9)
WBC OTHER # BLD: 3.4 K/UL (ref 3.5–11)
WBC OTHER # BLD: 3.6 K/UL (ref 3.5–11)
WBC OTHER # BLD: 5.6 K/UL (ref 3.5–11)

## 2024-07-02 PROCEDURE — 6360000002 HC RX W HCPCS: Performed by: NURSE PRACTITIONER

## 2024-07-02 PROCEDURE — 2060000000 HC ICU INTERMEDIATE R&B

## 2024-07-02 PROCEDURE — 079T3ZX DRAINAGE OF BONE MARROW, PERCUTANEOUS APPROACH, DIAGNOSTIC: ICD-10-PCS | Performed by: RADIOLOGY

## 2024-07-02 PROCEDURE — 6370000000 HC RX 637 (ALT 250 FOR IP): Performed by: INTERNAL MEDICINE

## 2024-07-02 PROCEDURE — 36415 COLL VENOUS BLD VENIPUNCTURE: CPT

## 2024-07-02 PROCEDURE — 6370000000 HC RX 637 (ALT 250 FOR IP): Performed by: NURSE PRACTITIONER

## 2024-07-02 PROCEDURE — 6360000002 HC RX W HCPCS: Performed by: EMERGENCY MEDICINE

## 2024-07-02 PROCEDURE — 99222 1ST HOSP IP/OBS MODERATE 55: CPT | Performed by: INTERNAL MEDICINE

## 2024-07-02 PROCEDURE — 2580000003 HC RX 258: Performed by: INTERNAL MEDICINE

## 2024-07-02 PROCEDURE — 6360000004 HC RX CONTRAST MEDICATION: Performed by: INTERNAL MEDICINE

## 2024-07-02 PROCEDURE — 99223 1ST HOSP IP/OBS HIGH 75: CPT | Performed by: INTERNAL MEDICINE

## 2024-07-02 PROCEDURE — 6360000002 HC RX W HCPCS: Performed by: INTERNAL MEDICINE

## 2024-07-02 PROCEDURE — 71260 CT THORAX DX C+: CPT

## 2024-07-02 PROCEDURE — 07DR3ZX EXTRACTION OF ILIAC BONE MARROW, PERCUTANEOUS APPROACH, DIAGNOSTIC: ICD-10-PCS | Performed by: RADIOLOGY

## 2024-07-02 PROCEDURE — 80048 BASIC METABOLIC PNL TOTAL CA: CPT

## 2024-07-02 PROCEDURE — 2580000003 HC RX 258: Performed by: NURSE PRACTITIONER

## 2024-07-02 PROCEDURE — 85025 COMPLETE CBC W/AUTO DIFF WBC: CPT

## 2024-07-02 PROCEDURE — 85055 RETICULATED PLATELET ASSAY: CPT

## 2024-07-02 PROCEDURE — 2580000003 HC RX 258: Performed by: EMERGENCY MEDICINE

## 2024-07-02 RX ORDER — ATORVASTATIN CALCIUM 20 MG/1
20 TABLET, FILM COATED ORAL DAILY
Status: CANCELLED | OUTPATIENT
Start: 2024-07-03

## 2024-07-02 RX ORDER — SODIUM CHLORIDE 0.9 % (FLUSH) 0.9 %
5-40 SYRINGE (ML) INJECTION PRN
OUTPATIENT
Start: 2024-07-23

## 2024-07-02 RX ORDER — ACETAMINOPHEN 325 MG/1
650 TABLET ORAL
OUTPATIENT
Start: 2024-07-02

## 2024-07-02 RX ORDER — DIPHENHYDRAMINE HYDROCHLORIDE 50 MG/ML
25 INJECTION INTRAMUSCULAR; INTRAVENOUS ONCE
OUTPATIENT
Start: 2024-07-23 | End: 2024-07-23

## 2024-07-02 RX ORDER — DIPHENHYDRAMINE HYDROCHLORIDE 50 MG/ML
50 INJECTION INTRAMUSCULAR; INTRAVENOUS
OUTPATIENT
Start: 2024-07-16

## 2024-07-02 RX ORDER — SODIUM CHLORIDE 0.9 % (FLUSH) 0.9 %
10 SYRINGE (ML) INJECTION PRN
Status: DISCONTINUED | OUTPATIENT
Start: 2024-07-02 | End: 2024-07-02 | Stop reason: HOSPADM

## 2024-07-02 RX ORDER — SODIUM CHLORIDE 9 MG/ML
INJECTION, SOLUTION INTRAVENOUS CONTINUOUS
Status: CANCELLED | OUTPATIENT
Start: 2024-07-02

## 2024-07-02 RX ORDER — SODIUM CHLORIDE 0.9 % (FLUSH) 0.9 %
5-40 SYRINGE (ML) INJECTION PRN
Status: DISCONTINUED | OUTPATIENT
Start: 2024-07-02 | End: 2024-07-05 | Stop reason: HOSPADM

## 2024-07-02 RX ORDER — ALBUTEROL SULFATE 90 UG/1
4 AEROSOL, METERED RESPIRATORY (INHALATION) PRN
OUTPATIENT
Start: 2024-07-09

## 2024-07-02 RX ORDER — SODIUM CHLORIDE 0.9 % (FLUSH) 0.9 %
5-40 SYRINGE (ML) INJECTION PRN
Status: CANCELLED | OUTPATIENT
Start: 2024-07-02

## 2024-07-02 RX ORDER — HEPARIN 100 UNIT/ML
500 SYRINGE INTRAVENOUS PRN
OUTPATIENT
Start: 2024-07-09

## 2024-07-02 RX ORDER — DIPHENHYDRAMINE HYDROCHLORIDE 50 MG/ML
50 INJECTION INTRAMUSCULAR; INTRAVENOUS
OUTPATIENT
Start: 2024-07-09

## 2024-07-02 RX ORDER — HEPARIN 100 UNIT/ML
500 SYRINGE INTRAVENOUS PRN
OUTPATIENT
Start: 2024-07-16

## 2024-07-02 RX ORDER — SODIUM CHLORIDE 9 MG/ML
INJECTION, SOLUTION INTRAVENOUS CONTINUOUS
OUTPATIENT
Start: 2024-07-02

## 2024-07-02 RX ORDER — SODIUM CHLORIDE 9 MG/ML
5-250 INJECTION, SOLUTION INTRAVENOUS PRN
OUTPATIENT
Start: 2024-07-09

## 2024-07-02 RX ORDER — ACETAMINOPHEN 325 MG/1
650 TABLET ORAL
OUTPATIENT
Start: 2024-07-16

## 2024-07-02 RX ORDER — SODIUM CHLORIDE 9 MG/ML
INJECTION, SOLUTION INTRAVENOUS CONTINUOUS
OUTPATIENT
Start: 2024-07-16

## 2024-07-02 RX ORDER — ONDANSETRON 2 MG/ML
8 INJECTION INTRAMUSCULAR; INTRAVENOUS
OUTPATIENT
Start: 2024-07-09

## 2024-07-02 RX ORDER — SODIUM CHLORIDE 9 MG/ML
INJECTION, SOLUTION INTRAVENOUS CONTINUOUS
Status: DISCONTINUED | OUTPATIENT
Start: 2024-07-02 | End: 2024-07-02 | Stop reason: HOSPADM

## 2024-07-02 RX ORDER — FENOFIBRATE 160 MG/1
160 TABLET ORAL DAILY
Status: CANCELLED | OUTPATIENT
Start: 2024-07-03

## 2024-07-02 RX ORDER — LISINOPRIL 20 MG/1
40 TABLET ORAL DAILY
Status: DISCONTINUED | OUTPATIENT
Start: 2024-07-03 | End: 2024-07-05 | Stop reason: HOSPADM

## 2024-07-02 RX ORDER — ACETAMINOPHEN 325 MG/1
650 TABLET ORAL EVERY 6 HOURS PRN
Status: DISCONTINUED | OUTPATIENT
Start: 2024-07-02 | End: 2024-07-02 | Stop reason: HOSPADM

## 2024-07-02 RX ORDER — SODIUM CHLORIDE 0.9 % (FLUSH) 0.9 %
5-40 SYRINGE (ML) INJECTION EVERY 12 HOURS SCHEDULED
Status: DISCONTINUED | OUTPATIENT
Start: 2024-07-02 | End: 2024-07-02 | Stop reason: HOSPADM

## 2024-07-02 RX ORDER — ONDANSETRON 2 MG/ML
4 INJECTION INTRAMUSCULAR; INTRAVENOUS EVERY 6 HOURS PRN
Status: DISCONTINUED | OUTPATIENT
Start: 2024-07-02 | End: 2024-07-02 | Stop reason: HOSPADM

## 2024-07-02 RX ORDER — ACETAMINOPHEN 325 MG/1
650 TABLET ORAL
OUTPATIENT
Start: 2024-07-23

## 2024-07-02 RX ORDER — SODIUM CHLORIDE 9 MG/ML
INJECTION, SOLUTION INTRAVENOUS CONTINUOUS
OUTPATIENT
Start: 2024-07-23

## 2024-07-02 RX ORDER — CETIRIZINE HYDROCHLORIDE 10 MG/1
10 TABLET ORAL NIGHTLY
Status: DISCONTINUED | OUTPATIENT
Start: 2024-07-02 | End: 2024-07-05 | Stop reason: HOSPADM

## 2024-07-02 RX ORDER — ACETAMINOPHEN 325 MG/1
650 TABLET ORAL EVERY 6 HOURS PRN
Status: CANCELLED | OUTPATIENT
Start: 2024-07-02

## 2024-07-02 RX ORDER — ACETAMINOPHEN 325 MG/1
650 TABLET ORAL ONCE
OUTPATIENT
Start: 2024-07-16 | End: 2024-07-16

## 2024-07-02 RX ORDER — EPINEPHRINE 1 MG/ML
0.3 INJECTION, SOLUTION, CONCENTRATE INTRAVENOUS PRN
OUTPATIENT
Start: 2024-07-02

## 2024-07-02 RX ORDER — SODIUM CHLORIDE 9 MG/ML
INJECTION, SOLUTION INTRAVENOUS PRN
Status: DISCONTINUED | OUTPATIENT
Start: 2024-07-02 | End: 2024-07-05 | Stop reason: HOSPADM

## 2024-07-02 RX ORDER — SODIUM CHLORIDE 0.9 % (FLUSH) 0.9 %
5-40 SYRINGE (ML) INJECTION PRN
OUTPATIENT
Start: 2024-07-16

## 2024-07-02 RX ORDER — ONDANSETRON 2 MG/ML
4 INJECTION INTRAMUSCULAR; INTRAVENOUS EVERY 6 HOURS PRN
Status: DISCONTINUED | OUTPATIENT
Start: 2024-07-02 | End: 2024-07-05 | Stop reason: HOSPADM

## 2024-07-02 RX ORDER — PANTOPRAZOLE SODIUM 40 MG/1
40 TABLET, DELAYED RELEASE ORAL
Status: DISCONTINUED | OUTPATIENT
Start: 2024-07-02 | End: 2024-07-02 | Stop reason: HOSPADM

## 2024-07-02 RX ORDER — FENOFIBRATE 160 MG/1
160 TABLET ORAL DAILY
Status: DISCONTINUED | OUTPATIENT
Start: 2024-07-03 | End: 2024-07-03

## 2024-07-02 RX ORDER — DIPHENHYDRAMINE HYDROCHLORIDE 50 MG/ML
50 INJECTION INTRAMUSCULAR; INTRAVENOUS
OUTPATIENT
Start: 2024-07-02

## 2024-07-02 RX ORDER — DIPHENHYDRAMINE HYDROCHLORIDE 50 MG/ML
25 INJECTION INTRAMUSCULAR; INTRAVENOUS ONCE
OUTPATIENT
Start: 2024-07-16 | End: 2024-07-16

## 2024-07-02 RX ORDER — LISINOPRIL 20 MG/1
40 TABLET ORAL DAILY
Status: DISCONTINUED | OUTPATIENT
Start: 2024-07-02 | End: 2024-07-02 | Stop reason: HOSPADM

## 2024-07-02 RX ORDER — SODIUM CHLORIDE 0.9 % (FLUSH) 0.9 %
5-40 SYRINGE (ML) INJECTION EVERY 12 HOURS SCHEDULED
Status: CANCELLED | OUTPATIENT
Start: 2024-07-02

## 2024-07-02 RX ORDER — HEPARIN 100 UNIT/ML
500 SYRINGE INTRAVENOUS PRN
OUTPATIENT
Start: 2024-07-02

## 2024-07-02 RX ORDER — CARVEDILOL 25 MG/1
25 TABLET ORAL 2 TIMES DAILY WITH MEALS
Status: DISCONTINUED | OUTPATIENT
Start: 2024-07-03 | End: 2024-07-05 | Stop reason: HOSPADM

## 2024-07-02 RX ORDER — PANTOPRAZOLE SODIUM 40 MG/1
40 TABLET, DELAYED RELEASE ORAL
Status: CANCELLED | OUTPATIENT
Start: 2024-07-03

## 2024-07-02 RX ORDER — EPINEPHRINE 1 MG/ML
0.3 INJECTION, SOLUTION, CONCENTRATE INTRAVENOUS PRN
OUTPATIENT
Start: 2024-07-09

## 2024-07-02 RX ORDER — ALBUTEROL SULFATE 90 UG/1
4 AEROSOL, METERED RESPIRATORY (INHALATION) PRN
OUTPATIENT
Start: 2024-07-23

## 2024-07-02 RX ORDER — LISINOPRIL 20 MG/1
40 TABLET ORAL DAILY
Status: CANCELLED | OUTPATIENT
Start: 2024-07-03

## 2024-07-02 RX ORDER — ONDANSETRON 4 MG/1
4 TABLET, ORALLY DISINTEGRATING ORAL EVERY 8 HOURS PRN
Status: DISCONTINUED | OUTPATIENT
Start: 2024-07-02 | End: 2024-07-02 | Stop reason: HOSPADM

## 2024-07-02 RX ORDER — DIPHENHYDRAMINE HYDROCHLORIDE 50 MG/ML
25 INJECTION INTRAMUSCULAR; INTRAVENOUS ONCE
OUTPATIENT
Start: 2024-07-09 | End: 2024-07-09

## 2024-07-02 RX ORDER — HYDRALAZINE HYDROCHLORIDE 20 MG/ML
10 INJECTION INTRAMUSCULAR; INTRAVENOUS ONCE
Status: COMPLETED | OUTPATIENT
Start: 2024-07-02 | End: 2024-07-02

## 2024-07-02 RX ORDER — ATORVASTATIN CALCIUM 20 MG/1
20 TABLET, FILM COATED ORAL DAILY
Status: DISCONTINUED | OUTPATIENT
Start: 2024-07-02 | End: 2024-07-02 | Stop reason: HOSPADM

## 2024-07-02 RX ORDER — SODIUM CHLORIDE 9 MG/ML
INJECTION, SOLUTION INTRAVENOUS PRN
Status: DISCONTINUED | OUTPATIENT
Start: 2024-07-02 | End: 2024-07-02 | Stop reason: HOSPADM

## 2024-07-02 RX ORDER — HYDRALAZINE HYDROCHLORIDE 20 MG/ML
10 INJECTION INTRAMUSCULAR; INTRAVENOUS EVERY 4 HOURS PRN
Status: DISCONTINUED | OUTPATIENT
Start: 2024-07-02 | End: 2024-07-05 | Stop reason: HOSPADM

## 2024-07-02 RX ORDER — HEPARIN 100 UNIT/ML
500 SYRINGE INTRAVENOUS PRN
OUTPATIENT
Start: 2024-07-23

## 2024-07-02 RX ORDER — PANTOPRAZOLE SODIUM 40 MG/1
40 TABLET, DELAYED RELEASE ORAL
Status: DISCONTINUED | OUTPATIENT
Start: 2024-07-03 | End: 2024-07-05 | Stop reason: HOSPADM

## 2024-07-02 RX ORDER — CARVEDILOL 12.5 MG/1
25 TABLET ORAL 2 TIMES DAILY WITH MEALS
Status: CANCELLED | OUTPATIENT
Start: 2024-07-02

## 2024-07-02 RX ORDER — ONDANSETRON 4 MG/1
4 TABLET, ORALLY DISINTEGRATING ORAL EVERY 8 HOURS PRN
Status: CANCELLED | OUTPATIENT
Start: 2024-07-02

## 2024-07-02 RX ORDER — ONDANSETRON 2 MG/ML
4 INJECTION INTRAMUSCULAR; INTRAVENOUS EVERY 6 HOURS PRN
Status: CANCELLED | OUTPATIENT
Start: 2024-07-02

## 2024-07-02 RX ORDER — EPINEPHRINE 1 MG/ML
0.3 INJECTION, SOLUTION, CONCENTRATE INTRAVENOUS PRN
OUTPATIENT
Start: 2024-07-16

## 2024-07-02 RX ORDER — ACETAMINOPHEN 325 MG/1
650 TABLET ORAL ONCE
OUTPATIENT
Start: 2024-07-23 | End: 2024-07-23

## 2024-07-02 RX ORDER — ONDANSETRON 2 MG/ML
8 INJECTION INTRAMUSCULAR; INTRAVENOUS
OUTPATIENT
Start: 2024-07-23

## 2024-07-02 RX ORDER — ONDANSETRON 2 MG/ML
8 INJECTION INTRAMUSCULAR; INTRAVENOUS
OUTPATIENT
Start: 2024-07-16

## 2024-07-02 RX ORDER — SODIUM CHLORIDE 9 MG/ML
5-250 INJECTION, SOLUTION INTRAVENOUS PRN
OUTPATIENT
Start: 2024-07-23

## 2024-07-02 RX ORDER — DILTIAZEM HYDROCHLORIDE 60 MG/1
60 TABLET, FILM COATED ORAL EVERY 8 HOURS SCHEDULED
Status: DISCONTINUED | OUTPATIENT
Start: 2024-07-02 | End: 2024-07-05 | Stop reason: HOSPADM

## 2024-07-02 RX ORDER — 0.9 % SODIUM CHLORIDE 0.9 %
80 INTRAVENOUS SOLUTION INTRAVENOUS ONCE
Status: DISCONTINUED | OUTPATIENT
Start: 2024-07-02 | End: 2024-07-02 | Stop reason: HOSPADM

## 2024-07-02 RX ORDER — ACETAMINOPHEN 650 MG/1
650 SUPPOSITORY RECTAL EVERY 6 HOURS PRN
Status: DISCONTINUED | OUTPATIENT
Start: 2024-07-02 | End: 2024-07-02 | Stop reason: HOSPADM

## 2024-07-02 RX ORDER — ACETAMINOPHEN 325 MG/1
650 TABLET ORAL EVERY 6 HOURS PRN
Status: DISCONTINUED | OUTPATIENT
Start: 2024-07-02 | End: 2024-07-05 | Stop reason: HOSPADM

## 2024-07-02 RX ORDER — SODIUM CHLORIDE 9 MG/ML
INJECTION, SOLUTION INTRAVENOUS PRN
Status: CANCELLED | OUTPATIENT
Start: 2024-07-02

## 2024-07-02 RX ORDER — FENOFIBRATE 160 MG/1
160 TABLET ORAL DAILY
Status: DISCONTINUED | OUTPATIENT
Start: 2024-07-02 | End: 2024-07-02 | Stop reason: HOSPADM

## 2024-07-02 RX ORDER — EPINEPHRINE 1 MG/ML
0.3 INJECTION, SOLUTION, CONCENTRATE INTRAVENOUS PRN
OUTPATIENT
Start: 2024-07-23

## 2024-07-02 RX ORDER — ALBUTEROL SULFATE 90 UG/1
4 AEROSOL, METERED RESPIRATORY (INHALATION) PRN
OUTPATIENT
Start: 2024-07-16

## 2024-07-02 RX ORDER — DIPHENHYDRAMINE HYDROCHLORIDE 50 MG/ML
50 INJECTION INTRAMUSCULAR; INTRAVENOUS
OUTPATIENT
Start: 2024-07-23

## 2024-07-02 RX ORDER — SODIUM CHLORIDE 9 MG/ML
INJECTION, SOLUTION INTRAVENOUS CONTINUOUS
OUTPATIENT
Start: 2024-07-09

## 2024-07-02 RX ORDER — SODIUM CHLORIDE 9 MG/ML
5-250 INJECTION, SOLUTION INTRAVENOUS PRN
OUTPATIENT
Start: 2024-07-16

## 2024-07-02 RX ORDER — SODIUM CHLORIDE 0.9 % (FLUSH) 0.9 %
5-40 SYRINGE (ML) INJECTION PRN
Status: DISCONTINUED | OUTPATIENT
Start: 2024-07-02 | End: 2024-07-02 | Stop reason: HOSPADM

## 2024-07-02 RX ORDER — ACETAMINOPHEN 650 MG/1
650 SUPPOSITORY RECTAL EVERY 6 HOURS PRN
Status: DISCONTINUED | OUTPATIENT
Start: 2024-07-02 | End: 2024-07-05 | Stop reason: HOSPADM

## 2024-07-02 RX ORDER — ONDANSETRON 2 MG/ML
8 INJECTION INTRAMUSCULAR; INTRAVENOUS
OUTPATIENT
Start: 2024-07-02

## 2024-07-02 RX ORDER — CARVEDILOL 12.5 MG/1
25 TABLET ORAL 2 TIMES DAILY WITH MEALS
Status: DISCONTINUED | OUTPATIENT
Start: 2024-07-02 | End: 2024-07-02 | Stop reason: HOSPADM

## 2024-07-02 RX ORDER — CETIRIZINE HYDROCHLORIDE 10 MG/1
10 TABLET ORAL DAILY
Status: DISCONTINUED | OUTPATIENT
Start: 2024-07-03 | End: 2024-07-02

## 2024-07-02 RX ORDER — ACETAMINOPHEN 325 MG/1
650 TABLET ORAL ONCE
OUTPATIENT
Start: 2024-07-09 | End: 2024-07-09

## 2024-07-02 RX ORDER — ATORVASTATIN CALCIUM 20 MG/1
20 TABLET, FILM COATED ORAL DAILY
Status: DISCONTINUED | OUTPATIENT
Start: 2024-07-03 | End: 2024-07-05 | Stop reason: HOSPADM

## 2024-07-02 RX ORDER — SODIUM CHLORIDE 0.9 % (FLUSH) 0.9 %
5-40 SYRINGE (ML) INJECTION PRN
OUTPATIENT
Start: 2024-07-09

## 2024-07-02 RX ORDER — SODIUM CHLORIDE 0.9 % (FLUSH) 0.9 %
5-40 SYRINGE (ML) INJECTION EVERY 12 HOURS SCHEDULED
Status: DISCONTINUED | OUTPATIENT
Start: 2024-07-02 | End: 2024-07-05 | Stop reason: HOSPADM

## 2024-07-02 RX ORDER — ONDANSETRON 4 MG/1
4 TABLET, ORALLY DISINTEGRATING ORAL EVERY 8 HOURS PRN
Status: DISCONTINUED | OUTPATIENT
Start: 2024-07-02 | End: 2024-07-05 | Stop reason: HOSPADM

## 2024-07-02 RX ORDER — SODIUM CHLORIDE 9 MG/ML
INJECTION, SOLUTION INTRAVENOUS CONTINUOUS
Status: DISCONTINUED | OUTPATIENT
Start: 2024-07-02 | End: 2024-07-05 | Stop reason: HOSPADM

## 2024-07-02 RX ORDER — ACETAMINOPHEN 325 MG/1
650 TABLET ORAL
OUTPATIENT
Start: 2024-07-09

## 2024-07-02 RX ORDER — ALBUTEROL SULFATE 90 UG/1
4 AEROSOL, METERED RESPIRATORY (INHALATION) PRN
OUTPATIENT
Start: 2024-07-02

## 2024-07-02 RX ORDER — ACETAMINOPHEN 650 MG/1
650 SUPPOSITORY RECTAL EVERY 6 HOURS PRN
Status: CANCELLED | OUTPATIENT
Start: 2024-07-02

## 2024-07-02 RX ADMIN — DILTIAZEM HYDROCHLORIDE 60 MG: 30 TABLET, FILM COATED ORAL at 14:04

## 2024-07-02 RX ADMIN — DILTIAZEM HYDROCHLORIDE 60 MG: 60 TABLET, FILM COATED ORAL at 21:43

## 2024-07-02 RX ADMIN — SODIUM CHLORIDE, PRESERVATIVE FREE 10 ML: 5 INJECTION INTRAVENOUS at 21:47

## 2024-07-02 RX ADMIN — SODIUM CHLORIDE: 9 INJECTION, SOLUTION INTRAVENOUS at 04:23

## 2024-07-02 RX ADMIN — SODIUM CHLORIDE, PRESERVATIVE FREE 10 ML: 5 INJECTION INTRAVENOUS at 16:19

## 2024-07-02 RX ADMIN — PANTOPRAZOLE SODIUM 40 MG: 40 TABLET, DELAYED RELEASE ORAL at 06:23

## 2024-07-02 RX ADMIN — Medication 80 ML: at 16:19

## 2024-07-02 RX ADMIN — HYDRALAZINE HYDROCHLORIDE 10 MG: 20 INJECTION, SOLUTION INTRAMUSCULAR; INTRAVENOUS at 04:41

## 2024-07-02 RX ADMIN — IOPAMIDOL 75 ML: 755 INJECTION, SOLUTION INTRAVENOUS at 16:18

## 2024-07-02 RX ADMIN — SODIUM CHLORIDE: 9 INJECTION, SOLUTION INTRAVENOUS at 21:52

## 2024-07-02 RX ADMIN — LISINOPRIL 40 MG: 20 TABLET ORAL at 08:42

## 2024-07-02 RX ADMIN — IMMUNE GLOBULIN (HUMAN) 30000 MG: 10 INJECTION INTRAVENOUS; SUBCUTANEOUS at 15:21

## 2024-07-02 RX ADMIN — IMMUNE GLOBULIN (HUMAN) 30000 MG: 10 INJECTION INTRAVENOUS; SUBCUTANEOUS at 11:39

## 2024-07-02 RX ADMIN — CARVEDILOL 25 MG: 12.5 TABLET, FILM COATED ORAL at 17:25

## 2024-07-02 RX ADMIN — DEXAMETHASONE SODIUM PHOSPHATE 40 MG: 4 INJECTION INTRA-ARTICULAR; INTRALESIONAL; INTRAMUSCULAR; INTRAVENOUS; SOFT TISSUE at 02:39

## 2024-07-02 RX ADMIN — FENOFIBRATE 160 MG: 160 TABLET ORAL at 08:42

## 2024-07-02 RX ADMIN — CETIRIZINE HYDROCHLORIDE 10 MG: 10 TABLET ORAL at 22:20

## 2024-07-02 RX ADMIN — ATORVASTATIN CALCIUM 20 MG: 20 TABLET, FILM COATED ORAL at 08:42

## 2024-07-02 RX ADMIN — CARVEDILOL 25 MG: 12.5 TABLET, FILM COATED ORAL at 08:42

## 2024-07-02 RX ADMIN — DILTIAZEM HYDROCHLORIDE 60 MG: 30 TABLET, FILM COATED ORAL at 06:23

## 2024-07-02 ASSESSMENT — ENCOUNTER SYMPTOMS
SHORTNESS OF BREATH: 0
COUGH: 0
VOMITING: 0
ABDOMINAL PAIN: 0
NAUSEA: 0

## 2024-07-02 ASSESSMENT — PAIN SCALES - GENERAL: PAINLEVEL_OUTOF10: 0

## 2024-07-02 NOTE — CONSULTS
33.4* 32.0*   PLT 4* 3*     BMP:   No results for input(s): \"NA\", \"K\", \"CO2\", \"BUN\", \"CREATININE\", \"LABGLOM\", \"GLUCOSE\" in the last 72 hours.    PT/INR:   No results for input(s): \"PROTIME\", \"INR\" in the last 72 hours.      IMAGING DATA:  IMPRESSION:  1. Multiple enlarged lymph nodes seen in the middle mediastinum and bandar,  multiple which are partially calcified.  2. Several subcentimeter noncalcified nodules scattered throughout the lungs.  Metastatic disease cannot be excluded.  Mild bibasal bronchiectasis more  notably on the right.  3. Splenomegaly.  4. Cholelithiasis.  5. 6 mm nonobstructing left renal stone.  6. Prostatic enlargement and calculi.  7. Nonspecific stranding seen in the mesentery most notably in the  retroperitoneum. Multiple calcified mesenteric lymph nodes.  8. Trace pericardial effusion.  9. Limited exam due to lack of IV contrast    Primary Problem  Acute idiopathic thrombocytopenic purpura (HCC)    Active Hospital Problems    Diagnosis Date Noted    Anemia, normocytic normochromic [D64.9] 07/02/2024    Mediastinal adenopathy [R59.0] 07/02/2024    Hilar adenopathy [R59.0] 07/02/2024    Splenomegaly [R16.1] 07/02/2024    Thrombocytopenia (HCC) [D69.6] 07/02/2024    Lung nodules [R91.8] 06/18/2024    Lymphadenopathy [R59.1] 06/18/2024    Acute idiopathic thrombocytopenic purpura (HCC) [D69.3] 06/15/2024    Primary hypertension [I10] 05/16/2016         IMPRESSION:   Severe thrombocytopenia  Clinical workup suggests ITP  TTP and DIC and other consumptive thrombocytopenia is highly unlikely based on the workup done so far  Evaluation of the smear did not show many schistocytes  Concerning findings on CT scan with multiple enlarged lymph nodes and several subcentimeter noncalcified nodules in the lungs.  The picture is concerning for underlying lymphoproliferative disorders possibly lymphoma    RECOMMENDATIONS:  The patient did not respond to steroids  Plan IVIG today  Previously, he had very

## 2024-07-02 NOTE — CARE COORDINATION
Case Management Assessment  Initial Evaluation    Date/Time of Evaluation: 7/2/2024 11:05 AM  Assessment Completed by: IRIS Salcedo, RENETTAW    If patient is discharged prior to next notation, then this note serves as note for discharge by case management.    Patient Name: Bruno Morales                   YOB: 1950  Diagnosis: Thrombocytopenia (HCC) [D69.6]                   Date / Time: 7/1/2024 11:44 PM    Patient Admission Status: Inpatient   Readmission Risk (Low < 19, Mod (19-27), High > 27): Readmission Risk Score: 11.1    Current PCP: Margarette Vallejo MD  PCP verified by CM? Yes    Chart Reviewed: Yes      History Provided by: Patient  Patient Orientation: Alert and Oriented    Patient Cognition: Alert    Hospitalization in the last 30 days (Readmission):  Yes    If yes, Readmission Assessment in  Navigator will be completed.    Advance Directives:      Code Status: Full Code   Patient's Primary Decision Maker is:        Discharge Planning:    Patient lives with: Spouse/Significant Other Type of Home: House  Primary Care Giver: Self  Patient Support Systems include: Spouse/Significant Other, Family Members   Current Financial resources: Medicare  Current community resources: None  Current services prior to admission: None            Current DME:              Type of Home Care services:  None    ADLS  Prior functional level: Independent in ADLs/IADLs, Bathing, Dressing, Toileting, Feeding, Cooking, Housework, Shopping, Mobility  Current functional level: Mobility, Shopping, Housework, Cooking, Feeding, Toileting, Dressing, Bathing, Independent in ADLs/IADLs    PT AM-PAC:   /24  OT AM-PAC:   /24    Family can provide assistance at DC: Yes  Would you like Case Management to discuss the discharge plan with any other family members/significant others, and if so, who? Yes  Plans to Return to Present Housing: Yes  Other Identified Issues/Barriers to RETURNING to current housing: no barriers

## 2024-07-02 NOTE — CARE COORDINATION
Transport packet prepared in anticipation of patient transferring to [place] at 2130 tonight for continuation of medical oncology treatment.  Packet given to the nurse.

## 2024-07-02 NOTE — ED PROVIDER NOTES
[place] [id number]  Phone: [phone]      Pt Name: Bruno Morales  MRN:[id number]  Birthdate 1950  Date of evaluation: 7/1/2024      CHIEF COMPLAINT       Chief Complaint   Patient presents with    abnormal lab low platelets     Advised by PCP to come to ED for evaluation, platelet count very low \"4\"; hospitalized here 15-22 June for same       HISTORY OF PRESENT ILLNESS   Bruno Morales is a 73 y.o. male who presents for evaluation of thrombocytopenia.  The patient reports that he was recently admitted to the hospital for suspected ITP.  He was having bruising and was found to have thrombocytopenia on blood work.  He had an extensive workup and saw hematology oncology, Dr. Mcdaniel.  His platelet count got as low as 3.  He received a transfusion of 1 unit platelets, IVIG, and steroids with improvement.  He was told that his results did not support DIC or TTP.  The patient had improvement in platelets to 43 and he was discharged home.  The patient states that he has been feeling well and his bruising has improved.  He saw his PCP today for outpatient follow-up and had outpatient laboratory studies ordered.  He states that he was called just prior to arrival by his PCP because his platelet count came back at 4.  The patient was told to come back to the emergency department for evaluation.  He does not list any provoking or palliating factors.  The patient denies fever, chills, headache, vision changes, neck pain, back pain, chest pain, shortness of breath, abdominal pain, urinary/bowel symptoms, focal weakness, numbness, tingling, head injury, dizziness, lightheadedness, nausea, vomiting, recent injury or falls.    REVIEW OF SYSTEMS     Positive: Thrombocytopenia  Ten point review of systems was reviewed and is negative unless otherwise noted in the HPI    PAST MEDICAL HISTORY    has a past medical history of Hyperlipidemia and Hypertension.    SURGICAL HISTORY       ondansetron (ZOFRAN-ODT) disintegrating tablet 4 mg     ondansetron (ZOFRAN) injection 4 mg    OR Linked Order Group     acetaminophen (TYLENOL) tablet 650 mg     acetaminophen (TYLENOL) suppository 650 mg    hydrALAZINE (APRESOLINE) injection 10 mg        Vitals:    Vitals:    07/01/24 2346 07/02/24 0429   BP: (!) 167/76 (!) 186/86   Pulse: 65 58   Resp: 16 16   Temp: 97.7 °F (36.5 °C) 97.7 °F (36.5 °C)   TempSrc: Oral Oral   SpO2: 97% 98%   Weight: 70.3 kg (155 lb)    Height: 1.778 m (5' 10\")      -------------------------  BP: (!) 186/86, Temp: 97.7 °F (36.5 °C), Pulse: 58, Respirations: 16    CONSULTS:  IP CONSULT TO HEM/ONC    CRITICAL CARE:   None    PROCEDURES:  None    DIAGNOSIS/ MDM:   Bruno Morales is a 73 y.o. male who presents with thrombocytopenia.  The patient was recently hospitalized for suspected ITP.  His platelet count has dropped down to 4.  He will need to be admitted for further evaluation and treatment.  I contacted the hematologist on-call, Dr. Davila, who recommended that the patient be admitted and receive the same dose of IVIG and steroids that he received during last admission.  He does not feel that the patient needs to be transferred for his platelets unless there are signs of bleeding.  I subsequently ordered the patient IVIG and Decadron.  I discussed the case with the [facility], [place], at 12:39 AM and she agrees to admit the patient for further evaluation and treatment with hematology consult          FINAL IMPRESSION      1. Thrombocytopenia (HCC)          DISPOSITION/PLAN   DISPOSITION Admitted 07/02/2024 12:44:02 AM              (Please note that portions of this note were completed with a voice recognitionprogram.  Efforts were made to edit the dictations but occasionally words are mis-transcribed.)    Azalia Maxwell DO, FACEP  Emergency Physician Attending          Azalia Maxwell DO  07/02/24 4544

## 2024-07-02 NOTE — PLAN OF CARE
Problem: Discharge Planning  Goal: Discharge to home or other facility with appropriate resources  Outcome: Progressing     Problem: ABCDS Injury Assessment  Goal: Absence of physical injury  Outcome: Progressing     Problem: Safety - Adult  Goal: Free from fall injury  Outcome: Progressing   NO FALLS OR INJURIES THIS SHIFT, BED IN LOWEST POSITION, BRAKES ON, BED ALARM ON, CALL LIGHT IN REACH, SIDE RAILS UP X2.

## 2024-07-02 NOTE — H&P
[facility]  Office: [phone]  Phani Andino DO, Carlos A Hernandez DO, Sandor Rivera DO, Dre Marroquin DO, Cecelia Murillo MD, Rosie Robbins MD, Castro Hope MD, Becka Sharma MD,  Wolf Reardon MD, Edna Truong MD, Jamee Padilla MD,  Bia Weir DO, Jacob Riley MD, Rk Be MD, Diego Andino DO, Viviana Marroquin MD,  Jesse Mahan DO, Lauryn Rose MD, Paula Ramsay MD, Alyssia Henley MD, Chari Pastor MD,  Reynold Lennon MD, Luca Eckert MD, Speedy Durham MD, Devyn Moreno MD, Heber Valenzuela MD, David Paiz MD, Chava Nicole DO, Silverio Barrett DO, Lana Ahuja MD,  Sebastian Braswell MD, Shirley [place], CNP,  Darlyn Savage, CNP, Eagle Yoon, CNP,  Luzmaria Mitchell, DNP, Tonja Vizcaino, CNP, Tiff Hamilton, CNP, Glenna Taylor, CNP, Lili Cool, CNP, Amina Harrell, PA-C, Cheyenne Parker PA-C, Letitia Lynch, CNP, Jacque Lorenzo, CNP, Violette Romo, CNP, Kelly Minor, CNP, Marilynn Acuña, CNP, Lluvia Anand, CNS, Nerissa Resendiz, CNP, Ashley Benítez, CNP, Tracy Schwab, CNP         IN-PATIENT SERVICE  History and Physical  [facility]          Name: Bruno Morales  MRN: [id number]     Acct: [id number]  Room: 330/330-01    Admit Date: 7/1/2024  PCP: Margarette Vallejo MD    Chief Complaint:  Chief Complaint   Patient presents with    abnormal lab low platelets     Advised by PCP to come to ED for evaluation, platelet count very low \"4\"; hospitalized here 15-22 June for same        History of Present Illness:  Bruno Morales is a 73 y.o.  male who presents with abnormal lab low platelets (Advised by PCP to come to ED for evaluation, platelet count very low \"4\"; hospitalized here 15-22 June for same)    Patient was admitted thru ER with:  \"Bruno Morales is a 73 y.o. male who presents for evaluation of thrombocytopenia.  The patient reports that he was recently admitted to the hospital for suspected ITP.  He was having bruising and  deviation.   Cardiovascular:      Rate and Rhythm: Normal rate and regular rhythm.   Pulmonary:      Effort: Pulmonary effort is normal. No respiratory distress.      Breath sounds: Normal breath sounds. No wheezing or rales.   Chest:      Chest wall: No tenderness.   Abdominal:      General: There is no distension.      Palpations: Abdomen is soft.      Tenderness: There is no abdominal tenderness.   Musculoskeletal:         General: No tenderness.      Cervical back: Neck supple.   Skin:     General: Skin is warm and dry.      Findings: Bruising (No new ecchymoses) present.   Neurological:      Mental Status: He is alert and oriented to person, place, and time.           Data:  Laboratory Testing:  Recent Results (from the past 24 hour(s))   CBC with Auto Differential    Collection Time: 07/02/24  7:34 AM   Result Value Ref Range    WBC 5.6 3.5 - 11.0 k/uL    RBC 3.90 (L) 4.5 - 5.9 m/uL    Hemoglobin 11.3 (L) 13.5 - 17.5 g/dL    Hematocrit 33.4 (L) 41 - 53 %    MCV 85.8 80 - 100 fL    MCH 29.1 26 - 34 pg    MCHC 33.9 31 - 37 g/dL    RDW 14.3 12.5 - 15.4 %    Platelets 4 (LL) 140 - 450 k/uL    MPV 12.5 (H) 6.0 - 12.0 fL    Neutrophils % 92 (H) 36 - 66 %    Lymphocytes % 4 (L) 24 - 44 %    Monocytes % 3 1 - 7 %    Eosinophils % 1 1 - 4 %    Basophils % 0 0 - 2 %    Neutrophils Absolute 5.15 1.8 - 7.7 k/uL    Lymphocytes Absolute 0.22 (L) 1.0 - 4.8 k/uL    Monocytes Absolute 0.17 0.1 - 0.8 k/uL    Eosinophils Absolute 0.06 0.0 - 0.4 k/uL    Basophils Absolute 0.00 0.0 - 0.2 k/uL    Morphology Normal    CBC with Auto Differential    Collection Time: 07/02/24 12:34 PM   Result Value Ref Range    WBC 3.6 3.5 - 11.0 k/uL    RBC 3.71 (L) 4.5 - 5.9 m/uL    Hemoglobin 10.7 (L) 13.5 - 17.5 g/dL    Hematocrit 32.0 (L) 41 - 53 %    MCV 86.4 80 - 100 fL    MCH 28.8 26 - 34 pg    MCHC 33.4 31 - 37 g/dL    RDW 14.3 12.5 - 15.4 %    Platelets 3 (LL) 140 - 450 k/uL    MPV 12.5 (H) 6.0 - 12.0 fL    Neutrophils % 95 (H) 36 - 66 %

## 2024-07-02 NOTE — PLAN OF CARE
Problem: Discharge Planning  Goal: Discharge to home or other facility with appropriate resources  7/2/2024 1736 by Missy Blue RN  Outcome: Progressing  Flowsheets (Taken 7/2/2024 0750)  Discharge to home or other facility with appropriate resources:   Identify barriers to discharge with patient and caregiver   Identify discharge learning needs (meds, wound care, etc)  7/2/2024 0459 by Kelly Romo RN  Outcome: Progressing  Flowsheets (Taken 7/2/2024 0429)  Discharge to home or other facility with appropriate resources: Identify barriers to discharge with patient and caregiver     Problem: ABCDS Injury Assessment  Goal: Absence of physical injury  7/2/2024 1736 by Missy Blue RN  Outcome: Progressing  7/2/2024 0459 by Kelly Romo RN  Outcome: Progressing     Problem: Safety - Adult  Goal: Free from fall injury  7/2/2024 1736 by Missy Blue RN  Outcome: Progressing  7/2/2024 0500 by Kelly Romo RN  Outcome: Progressing

## 2024-07-03 ENCOUNTER — APPOINTMENT (OUTPATIENT)
Dept: CT IMAGING | Age: 74
DRG: 813 | End: 2024-07-03
Attending: STUDENT IN AN ORGANIZED HEALTH CARE EDUCATION/TRAINING PROGRAM
Payer: MEDICARE

## 2024-07-03 LAB
ANION GAP SERPL CALCULATED.3IONS-SCNC: 10 MMOL/L (ref 9–16)
ANION GAP SERPL CALCULATED.3IONS-SCNC: 9 MMOL/L (ref 9–16)
BASOPHILS # BLD: 0 K/UL (ref 0–0.2)
BASOPHILS NFR BLD: 0 % (ref 0–2)
BUN SERPL-MCNC: 25 MG/DL (ref 8–23)
BUN SERPL-MCNC: 25 MG/DL (ref 8–23)
CALCIUM SERPL-MCNC: 7.4 MG/DL (ref 8.6–10.4)
CALCIUM SERPL-MCNC: 7.7 MG/DL (ref 8.6–10.4)
CHLORIDE SERPL-SCNC: 110 MMOL/L (ref 98–107)
CHLORIDE SERPL-SCNC: 112 MMOL/L (ref 98–107)
CO2 SERPL-SCNC: 18 MMOL/L (ref 20–31)
CO2 SERPL-SCNC: 19 MMOL/L (ref 20–31)
CREAT SERPL-MCNC: 1.2 MG/DL (ref 0.7–1.2)
CREAT SERPL-MCNC: 1.3 MG/DL (ref 0.7–1.2)
EKG ATRIAL RATE: 70 BPM
EKG P AXIS: 64 DEGREES
EKG P-R INTERVAL: 214 MS
EKG Q-T INTERVAL: 474 MS
EKG QRS DURATION: 138 MS
EKG QTC CALCULATION (BAZETT): 511 MS
EKG R AXIS: -45 DEGREES
EKG T AXIS: 63 DEGREES
EKG VENTRICULAR RATE: 70 BPM
EOSINOPHIL # BLD: 0 K/UL (ref 0–0.4)
EOSINOPHIL # BLD: 0 K/UL (ref 0–0.4)
EOSINOPHIL # BLD: 0 K/UL (ref 0–0.44)
EOSINOPHIL # BLD: 0 K/UL (ref 0–0.44)
EOSINOPHILS RELATIVE PERCENT: 0 % (ref 1–4)
ERYTHROCYTE [DISTWIDTH] IN BLOOD BY AUTOMATED COUNT: 13.3 % (ref 11.8–14.4)
ERYTHROCYTE [DISTWIDTH] IN BLOOD BY AUTOMATED COUNT: 13.5 % (ref 11.8–14.4)
ERYTHROCYTE [DISTWIDTH] IN BLOOD BY AUTOMATED COUNT: 13.5 % (ref 11.8–14.4)
ERYTHROCYTE [DISTWIDTH] IN BLOOD BY AUTOMATED COUNT: 13.7 % (ref 11.8–14.4)
GFR, ESTIMATED: 59 ML/MIN/1.73M2
GFR, ESTIMATED: 63 ML/MIN/1.73M2
GLUCOSE SERPL-MCNC: 139 MG/DL (ref 74–99)
GLUCOSE SERPL-MCNC: 156 MG/DL (ref 74–99)
HCT VFR BLD AUTO: 29.8 % (ref 40.7–50.3)
HCT VFR BLD AUTO: 30.2 % (ref 40.7–50.3)
HCT VFR BLD AUTO: 31 % (ref 40.7–50.3)
HCT VFR BLD AUTO: 33.5 % (ref 40.7–50.3)
HGB BLD-MCNC: 10.1 G/DL (ref 13–17)
HGB BLD-MCNC: 10.8 G/DL (ref 13–17)
HGB BLD-MCNC: 9.8 G/DL (ref 13–17)
HGB BLD-MCNC: 9.9 G/DL (ref 13–17)
IMM GRANULOCYTES # BLD AUTO: 0.05 K/UL (ref 0–0.3)
IMM GRANULOCYTES # BLD AUTO: 0.09 K/UL (ref 0–0.3)
IMM GRANULOCYTES # BLD AUTO: 0.12 K/UL (ref 0–0.3)
IMM GRANULOCYTES # BLD AUTO: 0.14 K/UL (ref 0–0.3)
IMM GRANULOCYTES NFR BLD: 1 %
IMM GRANULOCYTES NFR BLD: 2 %
IMM GRANULOCYTES NFR BLD: 2 %
IMM GRANULOCYTES NFR BLD: 3 %
INR PPP: 1.2
IRON SATN MFR SERPL: 26 % (ref 20–55)
IRON SERPL-MCNC: 77 UG/DL (ref 61–157)
LYMPHOCYTES NFR BLD: 0.07 K/UL (ref 1–4.8)
LYMPHOCYTES NFR BLD: 0.24 K/UL (ref 1.1–3.7)
LYMPHOCYTES NFR BLD: 0.27 K/UL (ref 1.1–3.7)
LYMPHOCYTES NFR BLD: 0.27 K/UL (ref 1–4.8)
LYMPHOCYTES RELATIVE PERCENT: 1 % (ref 24–44)
LYMPHOCYTES RELATIVE PERCENT: 5 % (ref 24–44)
LYMPHOCYTES RELATIVE PERCENT: 6 % (ref 24–43)
LYMPHOCYTES RELATIVE PERCENT: 6 % (ref 24–43)
MAGNESIUM SERPL-MCNC: 2.3 MG/DL (ref 1.6–2.4)
MCH RBC QN AUTO: 28.6 PG (ref 25.2–33.5)
MCH RBC QN AUTO: 28.6 PG (ref 25.2–33.5)
MCH RBC QN AUTO: 28.9 PG (ref 25.2–33.5)
MCH RBC QN AUTO: 29 PG (ref 25.2–33.5)
MCHC RBC AUTO-ENTMCNC: 32.2 G/DL (ref 28.4–34.8)
MCHC RBC AUTO-ENTMCNC: 32.5 G/DL (ref 28.4–34.8)
MCHC RBC AUTO-ENTMCNC: 32.6 G/DL (ref 28.4–34.8)
MCHC RBC AUTO-ENTMCNC: 33.2 G/DL (ref 28.4–34.8)
MCV RBC AUTO: 86.9 FL (ref 82.6–102.9)
MCV RBC AUTO: 87.8 FL (ref 82.6–102.9)
MCV RBC AUTO: 88 FL (ref 82.6–102.9)
MCV RBC AUTO: 90.1 FL (ref 82.6–102.9)
MONOCYTES NFR BLD: 0.08 K/UL (ref 0.1–1.2)
MONOCYTES NFR BLD: 0.14 K/UL (ref 0.1–1.2)
MONOCYTES NFR BLD: 0.22 K/UL (ref 0.1–0.8)
MONOCYTES NFR BLD: 0.27 K/UL (ref 0.1–0.8)
MONOCYTES NFR BLD: 2 % (ref 3–12)
MONOCYTES NFR BLD: 3 % (ref 1–7)
MONOCYTES NFR BLD: 3 % (ref 3–12)
MONOCYTES NFR BLD: 5 % (ref 1–7)
MORPHOLOGY: ABNORMAL
MORPHOLOGY: NORMAL
NEUTROPHILS NFR BLD: 89 % (ref 36–65)
NEUTROPHILS NFR BLD: 89 % (ref 36–65)
NEUTROPHILS NFR BLD: 89 % (ref 36–66)
NEUTROPHILS NFR BLD: 94 % (ref 36–66)
NEUTS SEG NFR BLD: 3.56 K/UL (ref 1.5–8.1)
NEUTS SEG NFR BLD: 4 K/UL (ref 1.5–8.1)
NEUTS SEG NFR BLD: 4.81 K/UL (ref 1.8–7.7)
NEUTS SEG NFR BLD: 6.77 K/UL (ref 1.8–7.7)
NRBC BLD-RTO: 0 PER 100 WBC
PARTIAL THROMBOPLASTIN TIME: 29.2 SEC (ref 23–36.5)
PLATELET # BLD AUTO: ABNORMAL K/UL (ref 138–453)
PLATELET, FLUORESCENCE: 14 K/UL (ref 138–453)
PLATELET, FLUORESCENCE: 14 K/UL (ref 138–453)
PLATELET, FLUORESCENCE: 16 K/UL (ref 138–453)
PLATELET, FLUORESCENCE: 19 K/UL (ref 138–453)
PLATELETS.RETICULATED NFR BLD AUTO: 18 % (ref 1.1–10.3)
PLATELETS.RETICULATED NFR BLD AUTO: 20.5 % (ref 1.1–10.3)
PLATELETS.RETICULATED NFR BLD AUTO: 25.7 % (ref 1.1–10.3)
PLATELETS.RETICULATED NFR BLD AUTO: 27.5 % (ref 1.1–10.3)
POTASSIUM SERPL-SCNC: 3.5 MMOL/L (ref 3.7–5.3)
POTASSIUM SERPL-SCNC: 3.6 MMOL/L (ref 3.7–5.3)
PROTHROMBIN TIME: 15.3 SEC (ref 11.7–14.9)
RBC # BLD AUTO: 3.43 M/UL (ref 4.21–5.77)
RBC # BLD AUTO: 3.43 M/UL (ref 4.21–5.77)
RBC # BLD AUTO: 3.53 M/UL (ref 4.21–5.77)
RBC # BLD AUTO: 3.72 M/UL (ref 4.21–5.77)
SODIUM SERPL-SCNC: 138 MMOL/L (ref 136–145)
SODIUM SERPL-SCNC: 140 MMOL/L (ref 136–145)
TIBC SERPL-MCNC: 293 UG/DL (ref 250–450)
TROPONIN I SERPL HS-MCNC: 23 NG/L (ref 0–22)
UNSATURATED IRON BINDING CAPACITY: 216 UG/DL (ref 112–347)
WBC OTHER # BLD: 4 K/UL (ref 3.5–11.3)
WBC OTHER # BLD: 4.5 K/UL (ref 3.5–11.3)
WBC OTHER # BLD: 5.4 K/UL (ref 3.5–11.3)
WBC OTHER # BLD: 7.2 K/UL (ref 3.5–11.3)

## 2024-07-03 PROCEDURE — 2580000003 HC RX 258: Performed by: INTERNAL MEDICINE

## 2024-07-03 PROCEDURE — 6360000002 HC RX W HCPCS: Performed by: STUDENT IN AN ORGANIZED HEALTH CARE EDUCATION/TRAINING PROGRAM

## 2024-07-03 PROCEDURE — 84484 ASSAY OF TROPONIN QUANT: CPT

## 2024-07-03 PROCEDURE — 2060000000 HC ICU INTERMEDIATE R&B

## 2024-07-03 PROCEDURE — 6370000000 HC RX 637 (ALT 250 FOR IP): Performed by: INTERNAL MEDICINE

## 2024-07-03 PROCEDURE — 6360000002 HC RX W HCPCS: Performed by: NURSE PRACTITIONER

## 2024-07-03 PROCEDURE — 88264 CHROMOSOME ANALYSIS 20-25: CPT

## 2024-07-03 PROCEDURE — 85730 THROMBOPLASTIN TIME PARTIAL: CPT

## 2024-07-03 PROCEDURE — 88342 IMHCHEM/IMCYTCHM 1ST ANTB: CPT

## 2024-07-03 PROCEDURE — 83735 ASSAY OF MAGNESIUM: CPT

## 2024-07-03 PROCEDURE — 83540 ASSAY OF IRON: CPT

## 2024-07-03 PROCEDURE — 80048 BASIC METABOLIC PNL TOTAL CA: CPT

## 2024-07-03 PROCEDURE — 6370000000 HC RX 637 (ALT 250 FOR IP): Performed by: STUDENT IN AN ORGANIZED HEALTH CARE EDUCATION/TRAINING PROGRAM

## 2024-07-03 PROCEDURE — 88185 FLOWCYTOMETRY/TC ADD-ON: CPT

## 2024-07-03 PROCEDURE — 88237 TISSUE CULTURE BONE MARROW: CPT

## 2024-07-03 PROCEDURE — 85025 COMPLETE CBC W/AUTO DIFF WBC: CPT

## 2024-07-03 PROCEDURE — 88184 FLOWCYTOMETRY/ TC 1 MARKER: CPT

## 2024-07-03 PROCEDURE — 99232 SBSQ HOSP IP/OBS MODERATE 35: CPT | Performed by: INTERNAL MEDICINE

## 2024-07-03 PROCEDURE — 2580000003 HC RX 258: Performed by: STUDENT IN AN ORGANIZED HEALTH CARE EDUCATION/TRAINING PROGRAM

## 2024-07-03 PROCEDURE — 88305 TISSUE EXAM BY PATHOLOGIST: CPT

## 2024-07-03 PROCEDURE — 93005 ELECTROCARDIOGRAM TRACING: CPT | Performed by: NURSE PRACTITIONER

## 2024-07-03 PROCEDURE — 99222 1ST HOSP IP/OBS MODERATE 55: CPT | Performed by: INTERNAL MEDICINE

## 2024-07-03 PROCEDURE — 36415 COLL VENOUS BLD VENIPUNCTURE: CPT

## 2024-07-03 PROCEDURE — 2709999900 CT BIOPSY BONE MARROW

## 2024-07-03 PROCEDURE — 6370000000 HC RX 637 (ALT 250 FOR IP): Performed by: NURSE PRACTITIONER

## 2024-07-03 PROCEDURE — 99223 1ST HOSP IP/OBS HIGH 75: CPT | Performed by: STUDENT IN AN ORGANIZED HEALTH CARE EDUCATION/TRAINING PROGRAM

## 2024-07-03 PROCEDURE — 88280 CHROMOSOME KARYOTYPE STUDY: CPT

## 2024-07-03 PROCEDURE — 3E0330M INTRODUCTION OF MONOCLONAL ANTIBODY INTO PERIPHERAL VEIN, PERCUTANEOUS APPROACH: ICD-10-PCS | Performed by: INTERNAL MEDICINE

## 2024-07-03 PROCEDURE — 88311 DECALCIFY TISSUE: CPT

## 2024-07-03 PROCEDURE — 85055 RETICULATED PLATELET ASSAY: CPT

## 2024-07-03 PROCEDURE — 6360000002 HC RX W HCPCS: Performed by: RADIOLOGY

## 2024-07-03 PROCEDURE — 85610 PROTHROMBIN TIME: CPT

## 2024-07-03 PROCEDURE — 6360000002 HC RX W HCPCS: Performed by: INTERNAL MEDICINE

## 2024-07-03 PROCEDURE — 88313 SPECIAL STAINS GROUP 2: CPT

## 2024-07-03 PROCEDURE — 88341 IMHCHEM/IMCYTCHM EA ADD ANTB: CPT

## 2024-07-03 PROCEDURE — 83550 IRON BINDING TEST: CPT

## 2024-07-03 RX ORDER — ACETAMINOPHEN 325 MG/1
650 TABLET ORAL ONCE
Status: COMPLETED | OUTPATIENT
Start: 2024-07-03 | End: 2024-07-03

## 2024-07-03 RX ORDER — SODIUM CHLORIDE 9 MG/ML
5-250 INJECTION, SOLUTION INTRAVENOUS PRN
Status: ACTIVE | OUTPATIENT
Start: 2024-07-03 | End: 2024-07-04

## 2024-07-03 RX ORDER — DIPHENHYDRAMINE HYDROCHLORIDE 50 MG/ML
25 INJECTION INTRAMUSCULAR; INTRAVENOUS ONCE
Status: COMPLETED | OUTPATIENT
Start: 2024-07-03 | End: 2024-07-03

## 2024-07-03 RX ORDER — BRIMONIDINE TARTRATE 2 MG/ML
1 SOLUTION/ DROPS OPHTHALMIC 2 TIMES DAILY
Status: DISCONTINUED | OUTPATIENT
Start: 2024-07-03 | End: 2024-07-03

## 2024-07-03 RX ORDER — FENOFIBRATE 54 MG/1
50 TABLET ORAL DAILY
Status: DISCONTINUED | OUTPATIENT
Start: 2024-07-04 | End: 2024-07-05 | Stop reason: HOSPADM

## 2024-07-03 RX ORDER — FENTANYL CITRATE 50 UG/ML
INJECTION, SOLUTION INTRAMUSCULAR; INTRAVENOUS PRN
Status: COMPLETED | OUTPATIENT
Start: 2024-07-03 | End: 2024-07-03

## 2024-07-03 RX ORDER — ONDANSETRON 2 MG/ML
8 INJECTION INTRAMUSCULAR; INTRAVENOUS
Status: ACTIVE | OUTPATIENT
Start: 2024-07-03 | End: 2024-07-04

## 2024-07-03 RX ORDER — SODIUM CHLORIDE 0.9 % (FLUSH) 0.9 %
5-40 SYRINGE (ML) INJECTION PRN
Status: ACTIVE | OUTPATIENT
Start: 2024-07-03 | End: 2024-07-04

## 2024-07-03 RX ORDER — POTASSIUM CHLORIDE 20 MEQ/1
40 TABLET, EXTENDED RELEASE ORAL ONCE
Status: DISCONTINUED | OUTPATIENT
Start: 2024-07-03 | End: 2024-07-03

## 2024-07-03 RX ORDER — BRIMONIDINE TARTRATE 2 MG/ML
1 SOLUTION/ DROPS OPHTHALMIC 2 TIMES DAILY
Status: DISCONTINUED | OUTPATIENT
Start: 2024-07-03 | End: 2024-07-05 | Stop reason: HOSPADM

## 2024-07-03 RX ADMIN — PANTOPRAZOLE SODIUM 40 MG: 40 TABLET, DELAYED RELEASE ORAL at 06:46

## 2024-07-03 RX ADMIN — BRIMONIDINE TARTRATE 1 DROP: 2 SOLUTION/ DROPS OPHTHALMIC at 20:34

## 2024-07-03 RX ADMIN — FENTANYL CITRATE 50 MCG: 50 INJECTION, SOLUTION INTRAMUSCULAR; INTRAVENOUS at 14:17

## 2024-07-03 RX ADMIN — ACETAMINOPHEN 650 MG: 325 TABLET ORAL at 17:07

## 2024-07-03 RX ADMIN — POTASSIUM BICARBONATE 40 MEQ: 782 TABLET, EFFERVESCENT ORAL at 08:37

## 2024-07-03 RX ADMIN — DILTIAZEM HYDROCHLORIDE 60 MG: 60 TABLET, FILM COATED ORAL at 06:18

## 2024-07-03 RX ADMIN — DILTIAZEM HYDROCHLORIDE 60 MG: 60 TABLET, FILM COATED ORAL at 20:43

## 2024-07-03 RX ADMIN — BRIMONIDINE TARTRATE 1 DROP: 2 SOLUTION/ DROPS OPHTHALMIC at 10:00

## 2024-07-03 RX ADMIN — LISINOPRIL 40 MG: 20 TABLET ORAL at 08:37

## 2024-07-03 RX ADMIN — FENOFIBRATE 160 MG: 160 TABLET ORAL at 08:37

## 2024-07-03 RX ADMIN — WATER 125 MG: 1 INJECTION INTRAMUSCULAR; INTRAVENOUS; SUBCUTANEOUS at 17:07

## 2024-07-03 RX ADMIN — CETIRIZINE HYDROCHLORIDE 10 MG: 10 TABLET ORAL at 20:34

## 2024-07-03 RX ADMIN — HYDRALAZINE HYDROCHLORIDE 10 MG: 20 INJECTION INTRAMUSCULAR; INTRAVENOUS at 15:40

## 2024-07-03 RX ADMIN — DIPHENHYDRAMINE HYDROCHLORIDE 25 MG: 50 INJECTION INTRAMUSCULAR; INTRAVENOUS at 17:07

## 2024-07-03 RX ADMIN — HYDRALAZINE HYDROCHLORIDE 10 MG: 20 INJECTION INTRAMUSCULAR; INTRAVENOUS at 00:21

## 2024-07-03 RX ADMIN — SODIUM CHLORIDE, PRESERVATIVE FREE 10 ML: 5 INJECTION INTRAVENOUS at 20:38

## 2024-07-03 RX ADMIN — ATORVASTATIN CALCIUM 20 MG: 20 TABLET, FILM COATED ORAL at 20:34

## 2024-07-03 RX ADMIN — RITUXIMAB 700 MG: 10 INJECTION, SOLUTION INTRAVENOUS at 17:22

## 2024-07-03 NOTE — PROGRESS NOTES
Today's Date: 7/3/2024  Patient Name: Bruno Morales  Date of admission: 7/2/2024  9:02 PM  Patient's age: 73 y.o., 1950  Admission Dx: Idiopathic thrombocytopenic purpura (HCC) [D69.3]    Reason for Consult: management recommendations  Requesting Physician: Speedy Chaves Sra, MD    CHIEF COMPLAINT: Severe thrombocytopenia    History Obtained From:  patient  INTERVAL HISTORY:    Pt seen and examined'   He denied any bleeding symptoms   He had BM biopsy today   Plan for rituxan today  NO NV    HISTORY OF PRESENT ILLNESS:    The patient is a 73 y.o.   male who is admitted to the hospital for petechiae and was found to have severe thrombocytopenia which is new onset.  The patient describes fatigue, mucosal bleeding from the mouth.  No hemorrhagic diatheses otherwise.  No hematuria or blood in the stool.   The patient was here couple weeks ago with low platelets and was diagnosed with ITP.  We wanted to do a bone marrow aspiration and biopsy but could not be done at while in-house.  The patient was steroid refractory and responded briefly to IVIG.  Also CT scan showed multiple and lymphadenopathy concerning for lymphomatous masses.  Past Medical History:   has a past medical history of Hyperlipidemia, Hypertension, and Thrombocytopenia (HCC).    Past Surgical History:   has a past surgical history that includes Tonsillectomy and CT BIOPSY BONE MARROW (7/3/2024).     Medications:    Reviewed in Epic     Allergies:  Asa [aspirin], Fish-derived products, Penicillins, and Tetracyclines & related    Social History:   reports that he has never smoked. He has never used smokeless tobacco. He reports that he does not drink alcohol and does not use drugs.     Family History: family history includes Dementia in his mother; Parkinson's Disease in his father.    REVIEW OF SYSTEMS:    Constitutional: No fever or chills. No night sweats, no weight loss   Eyes: No eye discharge, double vision, or eye pain   HEENT: negative

## 2024-07-03 NOTE — CARE COORDINATION
.Case Management Assessment  Initial Evaluation    Date/Time of Evaluation: 7/3/2024 11:34 AM  Assessment Completed by: Glenna Resendiz RN    If patient is discharged prior to next notation, then this note serves as note for discharge by case management.    Patient Name: Bruno Morales                   YOB: 1950  Diagnosis: Idiopathic thrombocytopenic purpura (HCC) [D69.3]                   Date / Time: 7/2/2024  9:02 PM    Patient Admission Status: Inpatient   Readmission Risk (Low < 19, Mod (19-27), High > 27): Readmission Risk Score: 19.3    Current PCP: Margarette Vallejo MD  PCP verified by CM? (P) Yes    Chart Reviewed: Yes      History Provided by: Patient  Patient Orientation: Alert and Oriented    Patient Cognition: Alert    Hospitalization in the last 30 days (Readmission):  Yes    If yes, Readmission Assessment in  Navigator will be completed.    Advance Directives:      Code Status: Full Code   Patient's Primary Decision Maker is: (P) Legal Next of Kin    Primary Decision Maker: Kristine Morales - Spouse - [phone]    Secondary Decision Maker: ShuRob - Other - [phone]    Discharge Planning:    Patient lives with: (P) Spouse/Significant Other Type of Home: (P) House  Primary Care Giver: Self  Patient Support Systems include: Spouse/Significant Other   Current Financial resources: (P) Medicare  Current community resources: (P) None  Current services prior to admission: (P) None            Current DME: (P) Walker            Type of Home Care services:  (P) None    ADLS  Prior functional level: (P) Independent in ADLs/IADLs  Current functional level: (P) Independent in ADLs/IADLs    PT AM-PAC:   /24  OT AM-PAC:   /24    Family can provide assistance at DC: (P) Yes  Would you like Case Management to discuss the discharge plan with any other family members/significant others, and if so, who? (P) Yes  Plans to Return to Present Housing: (P) Yes  Other Identified Issues/Barriers to

## 2024-07-03 NOTE — H&P
[facility]  Office: [phone]  Phani Andino DO, Carlos A Hernandez DO, Snador Rivera DO, Dre Marroquin DO, Cecelia Murillo MD, Rosie Robbins MD, Castro Hope MD, Becka Sharma MD,  Wolf Reardon MD, Edna Truong MD, Silverio Barrett DO, Jamee Padilla MD,  Bia Weir DO, Jacob Riley MD, Rk Be MD, Diego Andino DO, Viviana Marroquin MD,  Jesse Mahan DO, Lauryn Rose MD, Paula Ramsay MD, Alyssia Henley MD, Chari Pastor MD,  Reynold Lennon MD, Luca Eckert MD, Speedy Durham MD, Chava Nicole DO, Lana Ahuja MD,  Sebastian Braswell MD, Shirley [place], CNP,  Darlyn Savage, CNP, Kelly Minor, CNP, Eagle Yoon, CNP,  Luzmaria Mitchell, DNP, Tonja Vizcaino, CNP, Tiff Hamilton, CNP, Marilynn Acuña, CNP, Glenna Taylor, CNP, Lili Cool, CNP, Aki Patterson PA-C, Lluvia Anand, CNS, Nerissa Resendiz, CNP, Ashley Benítez, CNP         [facility]   IN-PATIENT SERVICE   [facility]    HISTORY AND PHYSICAL EXAMINATION            Date:   7/3/2024  Patient name:  Bruno Morales  Date of admission:  7/2/2024  9:02 PM  MRN:   [id number]  Account:  [id number]  YOB: 1950  PCP:    Margarette Vallejo MD  Room:   0436/0436-01  Code Status:    Full Code      History Obtained From:     patient, spouse, electronic medical record    History of Present Illness:     Bruno Morales is a 73 y.o. Non- / non  male who presents with No chief complaint on file.   and is admitted to the hospital for the management of Idiopathic thrombocytopenic purpura (HCC).    73-year-old male transferred from [facility] where he presented for evaluation of petechiae and was found to have severe thrombocytopenia.  He does have a history of fatigue and mucosal bleeding.  He was recently diagnosed with ITP and follows up with heme-onc.  Patient was started on steroid but unfortunately was refractory and responded briefly to IVIG.   care not provided in a hospital setting.  Expected length of stay > 48 hours. Patient is admitted in the Med/Surge    Medical Decision Making: High    Heber Valenzuela MD  7/3/2024  4:36 PM    Copy sent to Margarette Cruz MD

## 2024-07-03 NOTE — CONSULTS
Cardiology Consultation         Date:   7/3/2024  Patient name: Bruno Morales  Date of admission:  7/2/2024  9:02 PM  MRN:   [id number]  YOB: 1950    Reason for Admission: severe thrombocytopenia     Chief Complaint:  abnormal labs     History of present illness:     73-year-old male with no pertinent cardiac history, recently admitted to [facility] with severe thrombocytopenia, clinical workup suggested ITP, also found to have multiple enlarged lymph nodes in the mediastinum and lungs, plan for biopsy.  He received IVIG and steroids with improvement in platelet count to 40s.  His platelet counts dropped back to 4000.  He is being readmitted.  He was evaluated for frequent PVCs at [place] and close started on low-dose Cardizem.  On admission to , he continues to have intermittent PVCs and intermittent ventricular bigeminy.  He denies any cardiac symptoms perceived     Past Medical History:   has a past medical history of Hyperlipidemia, Hypertension, and Thrombocytopenia (HCC).    Past Surgical History:   has a past surgical history that includes Tonsillectomy.     Home Medications:    Prior to Admission medications    Medication Sig Start Date End Date Taking? Authorizing Provider   omeprazole (PRILOSEC) 40 MG delayed release capsule Take 1 capsule by mouth daily 6/22/24   Rosie Robbins MD   dilTIAZem (CARDIZEM) 60 MG tablet Take 1 tablet by mouth every 8 hours 6/22/24   Rosie Robbins MD   lisinopril (PRINIVIL;ZESTRIL) 40 MG tablet Take 1 tablet by mouth daily    ProviderZoya MD   brimonidine (ALPHAGAN P) 0.1 % SOLN Place 1 drop into the left eye 2 times daily    Zoya Murray MD   fenofibrate 160 MG tablet Take 1 tablet by mouth daily    Zoya Murray MD   carvedilol (COREG) 12.5 MG tablet Take 2 tablets by mouth 2 times daily (with meals)    Zoya Murray MD   simvastatin (ZOCOR) 40 MG tablet Take 1 tablet by mouth nightly

## 2024-07-03 NOTE — BRIEF OP NOTE
Brief Postoperative Note for Bone Marrow Biopsy    Bruno Morales  YOB: 1950  [id number]    Pre-operative Diagnosis: ITP     Post-operative Diagnosis: Same     Procedure: Left posterior iliac bone marrow aspiration with and without heparin & biopsy     Anesthesia: 1% lidocaine and Fentanyl     Surgeons/Assistants: MD Eulogio     Estimated Blood Loss: less than 50      Complications: None     Specimens Were Obtained: from the Right posterior iliac spine using an Arrow Oncontrol Device 11 g x 4 in. 6 cc was collected with heparin and 7 cc was collected without heparin. A bone biopsy was also obtained. Specimens were received by hem/onc at the time of collection. Vital signs were reviewed and were stable after the procedure.     Electronically signed by LEONOR Osborne on 7/3/2024 at 2:26 PM

## 2024-07-03 NOTE — ACP (ADVANCE CARE PLANNING)
Advance Care Planning     Pt brought in AD paperwork completed by his .  made copy and put in paper chart.         Healthcare Decision Maker:   Primary Decision Maker: Kristine Morales - Spouse - [phone]    Secondary Decision Maker: Rob Ireland - Other - [phone]  Click here to complete Healthcare Decision Makers including selection of the Healthcare Decision Maker Relationship (ie \"Primary\").     Electronically signed by Chaplain Yesenia, on 7/3/2024 at 11:22 AM.  [facility]  [phone]

## 2024-07-03 NOTE — PLAN OF CARE
Problem: Discharge Planning  Goal: Discharge to home or other facility with appropriate resources  7/2/2024 2035 by Kelly Romo, RN  Outcome: Progressing  Flowsheets (Taken 7/2/2024 2033)  Discharge to home or other facility with appropriate resources: Identify barriers to discharge with patient and caregiver     Problem: ABCDS Injury Assessment  Goal: Absence of physical injury  7/2/2024 2035 by Kelly Romo, RN  Outcome: Progressing     Problem: Safety - Adult  Goal: Free from fall injury  7/2/2024 2035 by Kelly Romo, RN  Outcome: Progressing   NO FALLS OR INJURIES THIS SHIFT, BED IN LOWEST POSITION, BRAKES ON, BED ALARM ON, CALL LIGHT IN REACH, SIDE RAILS UP X2.

## 2024-07-03 NOTE — DISCHARGE SUMMARY
[facility]  Office: [phone]  Phani Andino DO, Carlos A Hernandez DO, Sandor Rivera DO, Dre Marroquin DO, Cecelia Murillo MD, Rosie Robbins MD, Castro Hope MD, Becka Sharma MD,  Wolf Reardon MD, Edna Truong MD, Jamee Padilla MD,  Bia Weir DO, Jacob Riley MD, Rk Be MD, Diego Andino DO, Viviana Marroquin MD,  Jesse Mahan DO, Lauryn Rose MD, Paula Ramsay MD, Alyssia Henley MD, Chari Pastor MD,  Reynold Lennon MD, Luca Eckert MD, Speedy Durham MD, Devyn Moreno MD, Heber Valenzuela MD, David Paiz MD, Chava Nicole DO, Silverio Barrett DO, Lana Ahuja MD,  Sebastian Braswell MD, Shirley [place], CNP,  Darlyn Savage CNP, Eagle Yoon, CNP,  Luzmaria Mitchell, DNP, Tonja Vizcaino, CNP, Tiff Hamilton, CNP, Glenna Taylor CNP, Lili Cool, CNP, Amina Harrell, PA-C, Cheyenne Parker PA-C, Letitia Lynch, CNP, Jacque Lorenzo, CNP, Violette Romo, CNP, Kelly Minor, CNP, Marilynn Acuña, CNP, Lluvia Anand, CNS, Nerissa Resendiz, CNP, Ashley Benítez CNP, Tracy Schwab, CNP           IN-PATIENT SERVICE  [facility]    Discharge Summary    Patient ID: Bruno Morales  :  1950   MRN: [id number]     ACCOUNT:  [id number]   Patient's PCP: Margarette Vallejo MD  Admit Date: 2024   Discharge Date: 2024    Discharge Physician: Carlos A Hernandez DO     The patient was seen and examined on day of discharge and this discharge summary is in conjunction with any daily progress note from day of discharge.    Active Discharge Diagnoses:     Primary Problem  Acute idiopathic thrombocytopenic purpura (HCC)      Hospital Problems  Active Hospital Problems    Diagnosis Date Noted    Anemia, normocytic normochromic [D64.9] 2024     Recommendations/Findings:   IP CONSULT TO INTERVENTIONAL RADIOLOGY        Discharged Condition:    Stable     Disposition: transferred to [facility]'s    Physician Follow Up:   No follow-up provider specified.     Activity:  activity as tolerated    Diet:  cardiac diet     Discharge Medications:      Medication List        ASK your doctor about these medications      brimonidine 0.1 % Soln  Commonly known as: ALPHAGAN P     carvedilol 12.5 MG tablet  Commonly known as: COREG     dilTIAZem 60 MG tablet  Commonly known as: CARDIZEM  Take 1 tablet by mouth every 8 hours     fenofibrate 160 MG tablet  Commonly known as: TRIGLIDE     lisinopril 40 MG tablet  Commonly known as: PRINIVIL;ZESTRIL     loratadine 10 MG capsule  Commonly known as: CLARITIN     omeprazole 40 MG delayed release capsule  Commonly known as: PRILOSEC  Take 1 capsule by mouth daily     simvastatin 40 MG tablet  Commonly known as: ZOCOR              Time Spent on discharge is  36 mins in patient examination, evaluation, counseling, medication reconciliation, discharge plan and follow up.    Electronically signed by   Carlos A Hernandez DO  7/3/2024  6:26 PM      Thank you Margarette Cruz MD for the opportunity to be involved in this patient's care.

## 2024-07-03 NOTE — CARE COORDINATION
07/03/24 1129   Readmission Assessment   Number of Days since last admission? 8-30 days   Previous Disposition Home with Family   Who is being Interviewed Patient   What was the patient's/caregiver's perception as to why they think they needed to return back to the hospital? Did not realize care needs would be so extensive;Not enough help at home   Did you visit your Primary Care Physician after you left the hospital, before you returned this time? Yes   Did you see a specialist, such as Cardiac, Pulmonary, Orthopedic Physician, etc. after you left the hospital? No   Who advised the patient to return to the hospital? Physician's Nurse/Office staff   Does the patient report anything that got in the way of taking their medications? No   In our efforts to provide the best possible care to you and others like you, can you think of anything that we could have done to help you after you left the hospital the first time, so that you might not have needed to return so soon? Arrange for more help when leaving the hospital;Identify patient's health literacy needs;Additional Community resources available for illness support;Education on how to continue taking medications upon discharge;Discharge instructions that are concise, clear, and non contradictory

## 2024-07-03 NOTE — PLAN OF CARE
Patient admitted for thrombocytopenia, with a history of HLD and HTN. A&Ox4 and ambulates independently. Right forearm IV running NS at 75 mL/hr. Current vitals are as follows: BP (!) 142/54   Pulse 70   Temp 97.9 °F (36.6 °C) (Oral)   Resp 16   Ht 1.778 m (5' 10\")   Wt 70.8 kg (156 lb)   SpO2 94%   BMI 22.38 kg/m² on room air. Plan of care ongoing. Plan for bone marrow biopsy and Rituximab.     Problem: Cardiovascular - Adult  Goal: Maintains optimal cardiac output and hemodynamic stability  Outcome: Progressing     Problem: Skin/Tissue Integrity - Adult  Goal: Skin integrity remains intact  Outcome: Progressing     Problem: Infection - Adult  Goal: Absence of infection at discharge  Outcome: Progressing  Absence of infection at discharge: Assess and monitor for signs and symptoms of infection     Problem: Hematologic - Adult  Goal: Maintains hematologic stability  Outcome: Progressing  Maintains hematologic stability: Assess for signs and symptoms of bleeding or hemorrhage     Problem: Cardiovascular - Adult  Goal: Absence of cardiac dysrhythmias or at baseline  Outcome: Not Progressing     Sienna Pimentel RN

## 2024-07-03 NOTE — PLAN OF CARE
Problem: ABCDS Injury Assessment  Goal: Absence of physical injury  Outcome: Progressing  Flowsheets (Taken 7/3/2024 0725)  Absence of Physical Injury: Implement safety measures based on patient assessment     Problem: Safety - Adult  Goal: Free from fall injury  Outcome: Progressing     Problem: Cardiovascular - Adult  Goal: Maintains optimal cardiac output and hemodynamic stability  7/3/2024 1722 by Rosa Perdue RN  Outcome: Progressing  Flowsheets (Taken 7/3/2024 0800)  Maintains optimal cardiac output and hemodynamic stability: Monitor blood pressure and heart rate  7/3/2024 0714 by Sienna Pimentel RN  Outcome: Progressing  Goal: Absence of cardiac dysrhythmias or at baseline  7/3/2024 1722 by Rosa Perdue RN  Outcome: Progressing  Flowsheets (Taken 7/3/2024 0800)  Absence of cardiac dysrhythmias or at baseline: Monitor cardiac rate and rhythm  7/3/2024 0714 by Sienna Pimentel RN  Outcome: Not Progressing     Problem: Cardiovascular - Adult  Goal: Absence of cardiac dysrhythmias or at baseline  7/3/2024 1722 by Rosa Perdue RN  Outcome: Progressing  Flowsheets (Taken 7/3/2024 0800)  Absence of cardiac dysrhythmias or at baseline: Monitor cardiac rate and rhythm  7/3/2024 0714 by Sienna Pimentel RN  Outcome: Not Progressing

## 2024-07-04 LAB
ANION GAP SERPL CALCULATED.3IONS-SCNC: 9 MMOL/L (ref 9–16)
BASOPHILS # BLD: 0 K/UL (ref 0–0.2)
BASOPHILS # BLD: 0 K/UL (ref 0–0.2)
BASOPHILS NFR BLD: 0 % (ref 0–2)
BASOPHILS NFR BLD: 0 % (ref 0–2)
BUN SERPL-MCNC: 28 MG/DL (ref 8–23)
CALCIUM SERPL-MCNC: 6.9 MG/DL (ref 8.6–10.4)
CHLORIDE SERPL-SCNC: 114 MMOL/L (ref 98–107)
CO2 SERPL-SCNC: 17 MMOL/L (ref 20–31)
CREAT SERPL-MCNC: 1.3 MG/DL (ref 0.7–1.2)
EOSINOPHIL # BLD: 0 K/UL (ref 0–0.4)
EOSINOPHIL # BLD: 0 K/UL (ref 0–0.4)
EOSINOPHILS RELATIVE PERCENT: 0 % (ref 1–4)
EOSINOPHILS RELATIVE PERCENT: 0 % (ref 1–4)
ERYTHROCYTE [DISTWIDTH] IN BLOOD BY AUTOMATED COUNT: 13.7 % (ref 11.8–14.4)
ERYTHROCYTE [DISTWIDTH] IN BLOOD BY AUTOMATED COUNT: 13.8 % (ref 11.8–14.4)
GFR, ESTIMATED: 59 ML/MIN/1.73M2
GLUCOSE SERPL-MCNC: 168 MG/DL (ref 74–99)
HAPTOGLOB SERPL-MCNC: 40 MG/DL (ref 30–200)
HCT VFR BLD AUTO: 31.1 % (ref 40.7–50.3)
HCT VFR BLD AUTO: 31.3 % (ref 40.7–50.3)
HGB BLD-MCNC: 10 G/DL (ref 13–17)
HGB BLD-MCNC: 9.8 G/DL (ref 13–17)
IMM GRANULOCYTES # BLD AUTO: 0.04 K/UL (ref 0–0.3)
IMM GRANULOCYTES # BLD AUTO: 0.05 K/UL (ref 0–0.3)
IMM GRANULOCYTES NFR BLD: 1 %
IMM GRANULOCYTES NFR BLD: 1 %
IMM RETICS NFR: 10.4 % (ref 2.7–18.3)
LDH SERPL-CCNC: 187 U/L (ref 135–225)
LYMPHOCYTES NFR BLD: 0.12 K/UL (ref 1–4.8)
LYMPHOCYTES NFR BLD: 0.24 K/UL (ref 1–4.8)
LYMPHOCYTES RELATIVE PERCENT: 3 % (ref 24–44)
LYMPHOCYTES RELATIVE PERCENT: 5 % (ref 24–44)
MCH RBC QN AUTO: 28.9 PG (ref 25.2–33.5)
MCH RBC QN AUTO: 29.3 PG (ref 25.2–33.5)
MCHC RBC AUTO-ENTMCNC: 31.3 G/DL (ref 28.4–34.8)
MCHC RBC AUTO-ENTMCNC: 32.2 G/DL (ref 28.4–34.8)
MCV RBC AUTO: 91.2 FL (ref 82.6–102.9)
MCV RBC AUTO: 92.3 FL (ref 82.6–102.9)
MONOCYTES NFR BLD: 0.04 K/UL (ref 0.1–0.8)
MONOCYTES NFR BLD: 0.09 K/UL (ref 0.1–0.8)
MONOCYTES NFR BLD: 1 % (ref 1–7)
MONOCYTES NFR BLD: 2 % (ref 1–7)
MORPHOLOGY: NORMAL
MORPHOLOGY: NORMAL
NEUTROPHILS NFR BLD: 92 % (ref 36–66)
NEUTROPHILS NFR BLD: 95 % (ref 36–66)
NEUTS SEG NFR BLD: 3.8 K/UL (ref 1.8–7.7)
NEUTS SEG NFR BLD: 4.32 K/UL (ref 1.8–7.7)
NRBC BLD-RTO: 0 PER 100 WBC
NRBC BLD-RTO: 0 PER 100 WBC
NUCLEATED RED BLOOD CELLS: 1 PER 100 WBC
PLATELET # BLD AUTO: ABNORMAL K/UL (ref 138–453)
PLATELET # BLD AUTO: ABNORMAL K/UL (ref 138–453)
PLATELET, FLUORESCENCE: 17 K/UL (ref 138–453)
PLATELET, FLUORESCENCE: 21 K/UL (ref 138–453)
PLATELETS.RETICULATED NFR BLD AUTO: 14.5 % (ref 1.1–10.3)
PLATELETS.RETICULATED NFR BLD AUTO: 15.6 % (ref 1.1–10.3)
POTASSIUM SERPL-SCNC: 4.1 MMOL/L (ref 3.7–5.3)
RBC # BLD AUTO: 3.39 M/UL (ref 4.21–5.77)
RBC # BLD AUTO: 3.41 M/UL (ref 4.21–5.77)
RETIC HEMOGLOBIN: 30.1 PG (ref 28.2–35.7)
RETICS # AUTO: 0.07 M/UL (ref 0.03–0.08)
RETICS/RBC NFR AUTO: 2.2 % (ref 0.5–1.9)
SODIUM SERPL-SCNC: 140 MMOL/L (ref 136–145)
WBC OTHER # BLD: 4 K/UL (ref 3.5–11.3)
WBC OTHER # BLD: 4.7 K/UL (ref 3.5–11.3)

## 2024-07-04 PROCEDURE — 85045 AUTOMATED RETICULOCYTE COUNT: CPT

## 2024-07-04 PROCEDURE — 99232 SBSQ HOSP IP/OBS MODERATE 35: CPT | Performed by: INTERNAL MEDICINE

## 2024-07-04 PROCEDURE — 83010 ASSAY OF HAPTOGLOBIN QUANT: CPT

## 2024-07-04 PROCEDURE — 6370000000 HC RX 637 (ALT 250 FOR IP): Performed by: INTERNAL MEDICINE

## 2024-07-04 PROCEDURE — 6370000000 HC RX 637 (ALT 250 FOR IP): Performed by: NURSE PRACTITIONER

## 2024-07-04 PROCEDURE — 80048 BASIC METABOLIC PNL TOTAL CA: CPT

## 2024-07-04 PROCEDURE — 6360000002 HC RX W HCPCS: Performed by: NURSE PRACTITIONER

## 2024-07-04 PROCEDURE — 99232 SBSQ HOSP IP/OBS MODERATE 35: CPT | Performed by: STUDENT IN AN ORGANIZED HEALTH CARE EDUCATION/TRAINING PROGRAM

## 2024-07-04 PROCEDURE — 85055 RETICULATED PLATELET ASSAY: CPT

## 2024-07-04 PROCEDURE — 6360000002 HC RX W HCPCS: Performed by: INTERNAL MEDICINE

## 2024-07-04 PROCEDURE — 2060000000 HC ICU INTERMEDIATE R&B

## 2024-07-04 PROCEDURE — 83615 LACTATE (LD) (LDH) ENZYME: CPT

## 2024-07-04 PROCEDURE — 2580000003 HC RX 258: Performed by: INTERNAL MEDICINE

## 2024-07-04 PROCEDURE — 85025 COMPLETE CBC W/AUTO DIFF WBC: CPT

## 2024-07-04 PROCEDURE — 6370000000 HC RX 637 (ALT 250 FOR IP): Performed by: STUDENT IN AN ORGANIZED HEALTH CARE EDUCATION/TRAINING PROGRAM

## 2024-07-04 PROCEDURE — 36415 COLL VENOUS BLD VENIPUNCTURE: CPT

## 2024-07-04 RX ORDER — ACETAMINOPHEN 500 MG
1000 TABLET ORAL ONCE
Status: COMPLETED | OUTPATIENT
Start: 2024-07-04 | End: 2024-07-04

## 2024-07-04 RX ORDER — DIPHENHYDRAMINE HCL 25 MG
50 TABLET ORAL ONCE
Status: COMPLETED | OUTPATIENT
Start: 2024-07-04 | End: 2024-07-04

## 2024-07-04 RX ADMIN — SODIUM CHLORIDE, PRESERVATIVE FREE 10 ML: 5 INJECTION INTRAVENOUS at 20:50

## 2024-07-04 RX ADMIN — DIPHENHYDRAMINE HYDROCHLORIDE 50 MG: 25 TABLET ORAL at 13:04

## 2024-07-04 RX ADMIN — FENOFIBRATE 54 MG: 54 TABLET ORAL at 09:00

## 2024-07-04 RX ADMIN — ATORVASTATIN CALCIUM 20 MG: 20 TABLET, FILM COATED ORAL at 20:48

## 2024-07-04 RX ADMIN — SODIUM CHLORIDE: 9 INJECTION, SOLUTION INTRAVENOUS at 19:24

## 2024-07-04 RX ADMIN — BRIMONIDINE TARTRATE 1 DROP: 2 SOLUTION/ DROPS OPHTHALMIC at 09:00

## 2024-07-04 RX ADMIN — SODIUM CHLORIDE: 9 INJECTION, SOLUTION INTRAVENOUS at 00:47

## 2024-07-04 RX ADMIN — DILTIAZEM HYDROCHLORIDE 60 MG: 60 TABLET, FILM COATED ORAL at 20:48

## 2024-07-04 RX ADMIN — BRIMONIDINE TARTRATE 1 DROP: 2 SOLUTION/ DROPS OPHTHALMIC at 20:51

## 2024-07-04 RX ADMIN — LISINOPRIL 40 MG: 20 TABLET ORAL at 09:00

## 2024-07-04 RX ADMIN — PANTOPRAZOLE SODIUM 40 MG: 40 TABLET, DELAYED RELEASE ORAL at 06:18

## 2024-07-04 RX ADMIN — ACETAMINOPHEN 1000 MG: 500 TABLET ORAL at 13:04

## 2024-07-04 RX ADMIN — IMMUNE GLOBULIN (HUMAN) 70000 MG: 10 INJECTION INTRAVENOUS; SUBCUTANEOUS at 14:59

## 2024-07-04 RX ADMIN — CETIRIZINE HYDROCHLORIDE 10 MG: 10 TABLET ORAL at 20:48

## 2024-07-04 RX ADMIN — DILTIAZEM HYDROCHLORIDE 60 MG: 60 TABLET, FILM COATED ORAL at 06:18

## 2024-07-04 RX ADMIN — HYDRALAZINE HYDROCHLORIDE 10 MG: 20 INJECTION INTRAMUSCULAR; INTRAVENOUS at 15:50

## 2024-07-04 ASSESSMENT — PAIN SCALES - GENERAL
PAINLEVEL_OUTOF10: 0
PAINLEVEL_OUTOF10: 0

## 2024-07-04 NOTE — PLAN OF CARE
Problem: ABCDS Injury Assessment  Goal: Absence of physical injury  7/4/2024 0415 by Lu Uriostegui RN  Outcome: Progressing  7/3/2024 1722 by Rosa Perdue RN  Outcome: Progressing  Flowsheets (Taken 7/3/2024 0725)  Absence of Physical Injury: Implement safety measures based on patient assessment     Problem: Safety - Adult  Goal: Free from fall injury  7/4/2024 0415 by Lu Uriostegui RN  Outcome: Progressing  7/3/2024 1722 by Rosa Perdue RN  Outcome: Progressing     Problem: Cardiovascular - Adult  Goal: Maintains optimal cardiac output and hemodynamic stability  7/4/2024 0415 by Lu Uriostegui RN  Outcome: Progressing  7/3/2024 1722 by Rosa Perdue RN  Outcome: Progressing  Flowsheets (Taken 7/3/2024 0800)  Maintains optimal cardiac output and hemodynamic stability: Monitor blood pressure and heart rate  Goal: Absence of cardiac dysrhythmias or at baseline  7/4/2024 0415 by Lu Uriostegui RN  Outcome: Progressing  7/3/2024 1722 by Rosa Perdue RN  Outcome: Progressing  Flowsheets (Taken 7/3/2024 0800)  Absence of cardiac dysrhythmias or at baseline: Monitor cardiac rate and rhythm     Problem: Skin/Tissue Integrity - Adult  Goal: Skin integrity remains intact  7/4/2024 0415 by Lu Uriostegui RN  Outcome: Progressing  7/3/2024 1722 by Rosa Perdue RN  Outcome: Progressing  Flowsheets  Taken 7/3/2024 0800  Skin Integrity Remains Intact: Monitor for areas of redness and/or skin breakdown  Taken 7/3/2024 0725  Skin Integrity Remains Intact: Monitor for areas of redness and/or skin breakdown     Problem: Infection - Adult  Goal: Absence of infection at discharge  7/4/2024 0415 by Lu Uriostegui RN  Outcome: Progressing  7/3/2024 1722 by Rosa Perdue RN  Outcome: Progressing  Flowsheets (Taken 7/3/2024 0800)  Absence of infection at discharge: Assess and monitor for signs and symptoms of infection     Problem: Hematologic - Adult  Goal: Maintains hematologic stability  7/4/2024 0415 by Moody

## 2024-07-04 NOTE — PROGRESS NOTES
HGB 11.3* 10.7* 9.7*   < > 9.8*   < > 10.8* 10.0* 9.8*   HCT 33.4* 32.0* 29.2*   < > 30.2*   < > 33.5* 31.1* 31.3*   MCV 85.8 86.4 86.7   < > 88.0   < > 90.1 91.2 92.3   MCH 29.1 28.8 28.7   < > 28.6   < > 29.0 29.3 28.9   MCHC 33.9 33.4 33.1   < > 32.5   < > 32.2 32.2 31.3   RDW 14.3 14.3 14.2   < > 13.5   < > 13.7 13.8 13.7   PLT 4* 3* 10*   < > See Reflexed IPF Result   < > See Reflexed IPF Result See Reflexed IPF Result See Reflexed IPF Result   MPV 12.5* 12.5* 11.2  --   --   --   --   --   --    INR  --   --   --   --  1.2  --   --   --   --     < > = values in this interval not displayed.     Chemistry:  Recent Labs     07/02/24  2319 07/03/24  0454 07/04/24  0420    140 140   K 3.6* 3.5* 4.1   * 112* 114*   CO2 19* 18* 17*   GLUCOSE 139* 156* 168*   BUN 25* 25* 28*   CREATININE 1.3* 1.2 1.3*   MG  --  2.3  --    ANIONGAP 9 10 9   LABGLOM 59* 63 59*   CALCIUM 7.7* 7.4* 6.9*   TROPHS  --  23*  --      Recent Labs     07/04/24  0420        ABG:No results found for: \"POCPH\", \"PHART\", \"PH\", \"POCPCO2\", \"BCB6MYJ\", \"PCO2\", \"POCPO2\", \"PO2ART\", \"PO2\", \"POCHCO3\", \"MQD8TQR\", \"HCO3\", \"NBEA\", \"PBEA\", \"BEART\", \"BE\", \"THGBART\", \"THB\", \"QPO2CSS\", \"PUZK7MPC\", \"Y6BGHLSB\", \"O2SAT\", \"FIO2\"  Lab Results   Component Value Date/Time    SPECIAL  10/23/2015 10:05 AM     LEFT ARM 20 ML Performed at [facility], [address] [place] [address] 10/23/2015 10:05 AM     Lab Results   Component Value Date/Time    CULTURE NO GROWTH 6 DAYS 10/23/2015 10:05 AM    CULTURE  10/23/2015 10:05 AM     [place] [id number] ([phone]       Radiology:  CT BIOPSY BONE MARROW    Result Date: 7/3/2024  Successful CT guided bone marrow aspiration and core biopsy of the iliac bone.     CT CHEST W CONTRAST    Result Date: 7/2/2024  1. Bilateral solid pulmonary nodules measuring up to 1 cm.  Recommendation below. 2. Coarsely calcified  mediastinal and hilar nodes, likely sequela of old granulomatous disease. 3. Mild nonspecific pulmonary fibrosis. 4. Mild coronary artery calcification. RECOMMENDATIONS: Multiple pulmonary nodules. Most significant: Solid pulmonary nodule measuring 10 mm. Per Fleischner Society Guidelines, consider a non-contrast Chest CT at 3 months, a PET/CT, or tissue sampling. These guidelines do not apply to immunocompromised patients and patients with cancer. Follow up in patients with significant comorbidities as clinically warranted. For lung cancer screening, adhere to Lung-RADS guidelines. Reference: Radiology. 2017; 284(1):228-43.       Physical Examination:        General appearance:  alert, cooperative and no distress  Mental Status:  oriented to person, place and time and normal affect  Lungs:  clear to auscultation bilaterally, normal effort  Heart:  regular rate and rhythm, no murmur  Abdomen:  soft, nontender, nondistended, normal bowel sounds, no masses, hepatomegaly, splenomegaly  Extremities:  no edema, redness, tenderness in the calves  Skin:  no gross lesions, rashes, induration    Assessment:        Hospital Problems             Last Modified POA    * (Principal) Idiopathic thrombocytopenic purpura (HCC) 7/2/2024 Yes    Primary hypertension 7/3/2024 Yes    CKD stage 3b, GFR 30-44 ml/min (HCC) 7/3/2024 Yes    PVC's (premature ventricular contractions) 7/3/2024 Yes    Lung nodules 7/3/2024 Yes       Plan:        Thrombocytopenia-with concern for ITP  S/p bone marrow biopsy, s/p dose of Rituxan, follow heme-onc service recommendations, continue to monitor .  Possible plan for IVIG today.    Pulmonary nodules/widespread ozdxclwotccavhb-joanwv-rh with heme-onc as outpatient    Frequent PVCs with intermittent ventricular bigeminy-  Essential hypertension  -Continue Coreg, Cardizem, lisinopril, magnesium oxide, appreciate cardiology service recommendations    Hyperlipidemia-continue Lipitor, fenofibrate  CKD-continue

## 2024-07-04 NOTE — PLAN OF CARE
Problem: ABCDS Injury Assessment  Goal: Absence of physical injury  7/4/2024 1636 by Rosa Perdue RN  Outcome: Progressing  Flowsheets (Taken 7/4/2024 0715)  Absence of Physical Injury: Implement safety measures based on patient assessment  7/4/2024 0415 by Lu Uriostegui RN  Outcome: Progressing     Problem: Safety - Adult  Goal: Free from fall injury  7/4/2024 1636 by Rosa Perdue RN  Outcome: Progressing  7/4/2024 0415 by Lu Uriostegui RN  Outcome: Progressing     Problem: Cardiovascular - Adult  Goal: Maintains optimal cardiac output and hemodynamic stability  7/4/2024 1636 by Rosa Perdue RN  Outcome: Progressing  Flowsheets (Taken 7/4/2024 0800)  Maintains optimal cardiac output and hemodynamic stability: Monitor blood pressure and heart rate  7/4/2024 0415 by Lu Uriostegui RN  Outcome: Progressing  Goal: Absence of cardiac dysrhythmias or at baseline  7/4/2024 1636 by Rosa Perdue RN  Outcome: Progressing  Flowsheets (Taken 7/4/2024 0800)  Absence of cardiac dysrhythmias or at baseline: Monitor cardiac rate and rhythm  7/4/2024 0415 by Lu Uriostegui RN  Outcome: Progressing

## 2024-07-05 VITALS
HEIGHT: 70 IN | TEMPERATURE: 97.9 F | DIASTOLIC BLOOD PRESSURE: 61 MMHG | BODY MASS INDEX: 22.33 KG/M2 | OXYGEN SATURATION: 95 % | HEART RATE: 62 BPM | SYSTOLIC BLOOD PRESSURE: 155 MMHG | RESPIRATION RATE: 20 BRPM | WEIGHT: 156 LBS

## 2024-07-05 PROBLEM — I49.8 BIGEMINAL RHYTHM: Status: ACTIVE | Noted: 2024-07-05

## 2024-07-05 LAB
ANION GAP SERPL CALCULATED.3IONS-SCNC: 6 MMOL/L (ref 9–16)
BASOPHILS # BLD: 0 K/UL (ref 0–0.2)
BASOPHILS NFR BLD: 0 % (ref 0–2)
BUN SERPL-MCNC: 34 MG/DL (ref 8–23)
CALCIUM SERPL-MCNC: 7.1 MG/DL (ref 8.6–10.4)
CHLORIDE SERPL-SCNC: 114 MMOL/L (ref 98–107)
CO2 SERPL-SCNC: 19 MMOL/L (ref 20–31)
CREAT SERPL-MCNC: 1.3 MG/DL (ref 0.7–1.2)
EOSINOPHIL # BLD: 0 K/UL (ref 0–0.44)
EOSINOPHILS RELATIVE PERCENT: 0 % (ref 1–4)
ERYTHROCYTE [DISTWIDTH] IN BLOOD BY AUTOMATED COUNT: 13.8 % (ref 11.8–14.4)
GFR, ESTIMATED: 57 ML/MIN/1.73M2
GLUCOSE SERPL-MCNC: 84 MG/DL (ref 74–99)
HCT VFR BLD AUTO: 32.8 % (ref 40.7–50.3)
HGB BLD-MCNC: 10.1 G/DL (ref 13–17)
IMM GRANULOCYTES # BLD AUTO: 0.04 K/UL (ref 0–0.3)
IMM GRANULOCYTES NFR BLD: 1 %
LYMPHOCYTES NFR BLD: 0.52 K/UL (ref 1.1–3.7)
LYMPHOCYTES RELATIVE PERCENT: 13 % (ref 24–43)
MCH RBC QN AUTO: 28.5 PG (ref 25.2–33.5)
MCHC RBC AUTO-ENTMCNC: 30.8 G/DL (ref 28.4–34.8)
MCV RBC AUTO: 92.7 FL (ref 82.6–102.9)
MONOCYTES NFR BLD: 0.36 K/UL (ref 0.1–1.2)
MONOCYTES NFR BLD: 9 % (ref 3–12)
MORPHOLOGY: NORMAL
NEUTROPHILS NFR BLD: 77 % (ref 36–65)
NEUTS SEG NFR BLD: 3.08 K/UL (ref 1.5–8.1)
NRBC BLD-RTO: 0 PER 100 WBC
PLATELET # BLD AUTO: ABNORMAL K/UL (ref 138–453)
PLATELET, FLUORESCENCE: 35 K/UL (ref 138–453)
PLATELETS.RETICULATED NFR BLD AUTO: 13.2 % (ref 1.1–10.3)
POTASSIUM SERPL-SCNC: 3.9 MMOL/L (ref 3.7–5.3)
RBC # BLD AUTO: 3.54 M/UL (ref 4.21–5.77)
SODIUM SERPL-SCNC: 139 MMOL/L (ref 136–145)
WBC OTHER # BLD: 4 K/UL (ref 3.5–11.3)

## 2024-07-05 PROCEDURE — 36415 COLL VENOUS BLD VENIPUNCTURE: CPT

## 2024-07-05 PROCEDURE — 6370000000 HC RX 637 (ALT 250 FOR IP): Performed by: STUDENT IN AN ORGANIZED HEALTH CARE EDUCATION/TRAINING PROGRAM

## 2024-07-05 PROCEDURE — 99232 SBSQ HOSP IP/OBS MODERATE 35: CPT | Performed by: INTERNAL MEDICINE

## 2024-07-05 PROCEDURE — 6370000000 HC RX 637 (ALT 250 FOR IP): Performed by: INTERNAL MEDICINE

## 2024-07-05 PROCEDURE — 80048 BASIC METABOLIC PNL TOTAL CA: CPT

## 2024-07-05 PROCEDURE — 85055 RETICULATED PLATELET ASSAY: CPT

## 2024-07-05 PROCEDURE — 99239 HOSP IP/OBS DSCHRG MGMT >30: CPT | Performed by: STUDENT IN AN ORGANIZED HEALTH CARE EDUCATION/TRAINING PROGRAM

## 2024-07-05 PROCEDURE — 85025 COMPLETE CBC W/AUTO DIFF WBC: CPT

## 2024-07-05 RX ORDER — LANOLIN ALCOHOL/MO/W.PET/CERES
400 CREAM (GRAM) TOPICAL DAILY
Status: DISCONTINUED | OUTPATIENT
Start: 2024-07-05 | End: 2024-07-05 | Stop reason: HOSPADM

## 2024-07-05 RX ORDER — MAGNESIUM OXIDE 400 MG/1
400 TABLET ORAL DAILY
Qty: 30 TABLET | Refills: 1 | Status: SHIPPED | OUTPATIENT
Start: 2024-07-05

## 2024-07-05 RX ADMIN — DILTIAZEM HYDROCHLORIDE 60 MG: 60 TABLET, FILM COATED ORAL at 05:58

## 2024-07-05 RX ADMIN — PANTOPRAZOLE SODIUM 40 MG: 40 TABLET, DELAYED RELEASE ORAL at 05:58

## 2024-07-05 RX ADMIN — BRIMONIDINE TARTRATE 1 DROP: 2 SOLUTION/ DROPS OPHTHALMIC at 08:06

## 2024-07-05 RX ADMIN — MAGNESIUM GLUCONATE 500 MG ORAL TABLET 400 MG: 500 TABLET ORAL at 13:27

## 2024-07-05 RX ADMIN — LISINOPRIL 40 MG: 20 TABLET ORAL at 08:03

## 2024-07-05 RX ADMIN — FENOFIBRATE 54 MG: 54 TABLET ORAL at 08:03

## 2024-07-05 RX ADMIN — DILTIAZEM HYDROCHLORIDE 60 MG: 60 TABLET, FILM COATED ORAL at 13:27

## 2024-07-05 RX ADMIN — CARVEDILOL 25 MG: 25 TABLET, FILM COATED ORAL at 08:03

## 2024-07-05 NOTE — PLAN OF CARE
Problem: ABCDS Injury Assessment  Goal: Absence of physical injury  7/5/2024 1641 by Nilam Dixon RN  Outcome: Progressing  Flowsheets (Taken 7/5/2024 1639)  Absence of Physical Injury: Implement safety measures based on patient assessment  7/5/2024 0442 by Lu Uriostegui RN  Outcome: Progressing     Problem: Safety - Adult  Goal: Free from fall injury  7/5/2024 1641 by Nilam Dixon RN  Outcome: Progressing  Flowsheets (Taken 7/5/2024 1639)  Free From Fall Injury: Instruct family/caregiver on patient safety  7/5/2024 0442 by Lu Uriostegui RN  Outcome: Progressing     Problem: Cardiovascular - Adult  Goal: Maintains optimal cardiac output and hemodynamic stability  7/5/2024 1641 by Nilam Dixon RN  Outcome: Progressing  Flowsheets (Taken 7/5/2024 0800)  Maintains optimal cardiac output and hemodynamic stability: Monitor blood pressure and heart rate  7/5/2024 0442 by Lu Uriostegui RN  Outcome: Progressing  Goal: Absence of cardiac dysrhythmias or at baseline  7/5/2024 1641 by Nilam Dixon RN  Outcome: Progressing  Flowsheets (Taken 7/5/2024 0800)  Absence of cardiac dysrhythmias or at baseline: Monitor cardiac rate and rhythm  7/5/2024 0442 by Lu Uriostegui RN  Outcome: Progressing     Problem: Skin/Tissue Integrity - Adult  Goal: Skin integrity remains intact  7/5/2024 1641 by Nilam Dixon RN  Outcome: Progressing  Flowsheets  Taken 7/5/2024 1639  Skin Integrity Remains Intact: Monitor for areas of redness and/or skin breakdown  Taken 7/5/2024 1200  Skin Integrity Remains Intact: Monitor for areas of redness and/or skin breakdown  Taken 7/5/2024 0800  Skin Integrity Remains Intact: Monitor for areas of redness and/or skin breakdown  7/5/2024 0442 by Lu Uriostegui RN  Outcome: Progressing     Problem: Infection - Adult  Goal: Absence of infection at discharge  7/5/2024 1641 by Nilam Dixon RN  Outcome: Progressing  Flowsheets  Taken 7/5/2024 1200  Absence of infection at

## 2024-07-05 NOTE — PROGRESS NOTES
Today's Date: 7/4/2024  Patient Name: Bruno Morales  Date of admission: 7/2/2024  9:02 PM  Patient's age: 73 y.o., 1950  Admission Dx: Idiopathic thrombocytopenic purpura (HCC) [D69.3]    Reason for Consult: management recommendations  Requesting Physician: Speedy Chaves Sra, MD    CHIEF COMPLAINT: Severe thrombocytopenia    History Obtained From:  patient    INTERVAL HISTORY:    Pt seen and examined'   No bleeding   NO NV  Plts 17K    HISTORY OF PRESENT ILLNESS:    The patient is a 73 y.o.   male who is admitted to the hospital for petechiae and was found to have severe thrombocytopenia which is new onset.  The patient describes fatigue, mucosal bleeding from the mouth.  No hemorrhagic diatheses otherwise.  No hematuria or blood in the stool.   The patient was here couple weeks ago with low platelets and was diagnosed with ITP.  We wanted to do a bone marrow aspiration and biopsy but could not be done at while in-house.  The patient was steroid refractory and responded briefly to IVIG.  Also CT scan showed multiple and lymphadenopathy concerning for lymphomatous masses.  Past Medical History:   has a past medical history of Hyperlipidemia, Hypertension, and Thrombocytopenia (HCC).    Past Surgical History:   has a past surgical history that includes Tonsillectomy and CT BIOPSY BONE MARROW (7/3/2024).     Medications:    Reviewed in Epic     Allergies:  Asa [aspirin], Fish-derived products, Penicillins, and Tetracyclines & related    Social History:   reports that he has never smoked. He has never used smokeless tobacco. He reports that he does not drink alcohol and does not use drugs.     Family History: family history includes Dementia in his mother; Parkinson's Disease in his father.    REVIEW OF SYSTEMS:    Constitutional: No fever or chills. No night sweats, no weight loss   Eyes: No eye discharge, double vision, or eye pain   HEENT: negative for sore mouth, sore throat, hoarseness and voice change  nodes and several subcentimeter noncalcified nodules in the lungs.  The picture is concerning for underlying lymphoproliferative disorders possibly lymphoma    RECOMMENDATIONS:  I reviewed the laboratory data, imaging studies, prior records, outside records, discussed diagnosis treatment recommendations   He tolerated bone marrow biopsy procedure well   He received Rituxan dose 1  yesterday and will be given once a week  Will plan to give IVIG 1 dose today  Monitor CBC daily   Reviewed the side effects, risk benefits of rituximab with patient and is agreeable to proceed with   Likley discharge tomorrow if plts stable  We will follow BM biopsy results  Patient and family's questions were sought and answered to their satisfaction       Thank you for asking us to see this patient.    Rickey Davila MD  Hematologist/Medical Oncologist      This note is created with the assistance of a speech recognition program.  While intending to generate a document that actually reflects the content of the visit, the document can still have some errors including those of syntax and sound a like substitutions which may escape proof reading.  It such instances, actual meaning can be extrapolated by contextual diversion.

## 2024-07-05 NOTE — PROGRESS NOTES
CLINICAL PHARMACY NOTE: MEDS TO BEDS    Total # of Prescriptions Filled: 1   The following medications were delivered to the patient:  Magnesium oxide 400 mg      Additional Documentation:   Delivered to pt +1 on 7/5 at 12:20 pm.  Pt had copay of $2.25 and paid with cash.

## 2024-07-05 NOTE — PLAN OF CARE
Problem: ABCDS Injury Assessment  Goal: Absence of physical injury  7/5/2024 0442 by Lu Uriostegui RN  Outcome: Progressing  7/4/2024 1636 by Rosa Perdue RN  Outcome: Progressing  Flowsheets (Taken 7/4/2024 0715)  Absence of Physical Injury: Implement safety measures based on patient assessment     Problem: Safety - Adult  Goal: Free from fall injury  7/5/2024 0442 by Lu Uriostegui RN  Outcome: Progressing  7/4/2024 1636 by Rosa Perdue RN  Outcome: Progressing     Problem: Cardiovascular - Adult  Goal: Maintains optimal cardiac output and hemodynamic stability  7/5/2024 0442 by Lu Uriostegui RN  Outcome: Progressing  7/4/2024 1636 by Rosa Perdue RN  Outcome: Progressing  Flowsheets (Taken 7/4/2024 0800)  Maintains optimal cardiac output and hemodynamic stability: Monitor blood pressure and heart rate  Goal: Absence of cardiac dysrhythmias or at baseline  7/5/2024 0442 by Lu Uriostegui RN  Outcome: Progressing  7/4/2024 1636 by Rosa Perdue RN  Outcome: Progressing  Flowsheets (Taken 7/4/2024 0800)  Absence of cardiac dysrhythmias or at baseline: Monitor cardiac rate and rhythm     Problem: Skin/Tissue Integrity - Adult  Goal: Skin integrity remains intact  7/5/2024 0442 by Lu Uriostegui RN  Outcome: Progressing  7/4/2024 1636 by Rosa Perdue RN  Outcome: Progressing  Flowsheets  Taken 7/4/2024 0800  Skin Integrity Remains Intact: Monitor for areas of redness and/or skin breakdown  Taken 7/4/2024 0715  Skin Integrity Remains Intact: Monitor for areas of redness and/or skin breakdown     Problem: Infection - Adult  Goal: Absence of infection at discharge  7/5/2024 0442 by Lu Uriostegui RN  Outcome: Progressing  7/4/2024 1636 by Rosa Perdue RN  Outcome: Progressing  Flowsheets (Taken 7/4/2024 0800)  Absence of infection at discharge: Assess and monitor for signs and symptoms of infection     Problem: Hematologic - Adult  Goal: Maintains hematologic stability  7/5/2024 0442 by Moody  Lu RN  Outcome: Progressing  7/4/2024 1636 by Rosa Perdue RN  Outcome: Progressing  Flowsheets (Taken 7/4/2024 0800)  Maintains hematologic stability: Assess for signs and symptoms of bleeding or hemorrhage

## 2024-07-05 NOTE — DISCHARGE SUMMARY
@[email or web address]@    [facility]   IN-PATIENT SERVICE   [facility]    Discharge Summary     Patient ID: Bruno Morales  :  1950   MRN: [id number]     ACCOUNT:  [id number]   Patient's PCP: Margarette Vallejo MD  Admit Date: 2024   Discharge Date: 2024   Length of Stay: 3  Code Status:  Full Code  Admitting Physician: Speedy Chaves Sra, MD  Discharge Physician: Speedy Chaves Sra, MD     Active Discharge Diagnoses:     Hospital Problem Lists:  Principal Problem:    Idiopathic thrombocytopenic purpura (HCC)  Active Problems:    Primary hypertension    CKD stage 3b, GFR 30-44 ml/min (HCC)    H/O: stroke    PVC's (premature ventricular contractions)    Lung nodules    Lymphadenopathy    Mediastinal adenopathy    Hilar adenopathy    Splenomegaly  Resolved Problems:    * No resolved hospital problems. *      Admission Condition:  stable     Discharged Condition: stable    Hospital Stay:     Hospital Course: 73-year-old male who was admitted for fluid thrombocytopenia with concern for mucosal bleeding from mouth, no bleeding otherwise, he was recently admitted for similar complaints of low platelets and was diagnosed with ITP, at that time plan was for bone marrow aspiration/biopsy, also has concern for extensive lymphadenopathy concerning for lymphoproliferative disorder,  This admission heme-onc was consulted patient underwent bone marrow biopsy,, patient was given dose of Rituxan, also given a dose of IVIG with improvement, cardiology was on board due to concern of PVCs and bigeminy, home dose Cardizem, Coreg resumed, also started on magnesium oxide, currently stable for discharge          Significant therapeutic interventions: As described above    Significant Diagnostic Studies:   Labs / Micro:  CBC:   Lab Results   Component Value Date/Time    WBC 4.0 2024 06:55 AM    RBC 3.54 2024 06:55 AM    HGB 10.1 2024 06:55 AM    HCT 32.8 2024 06:55 AM  Soft Tissues/Bones: There are hypertrophic degenerative changes in the thoracic spine. No acute osseous abnormalities. FINDINGS:. Organs: Liver is normal in size and density. No focal masses identified. No evidence of intrahepatic ductal dilatation.    Spleen is enlarged.  The gallbladder contains a small stone.  Normal gallbladder wall..  Both adrenal glands are normal.  Pancreas is normal in appearance.  6 mm nonobstructing left renal stone.  The kidneys are otherwise normal in size and attenuation without evidence of hydronephrosis GI/Bowel: The visualized bowel and mesentery show no mass lesions. Normal appendix.  Small hiatal hernia. Pelvis: Prostatic enlargement and calculi.  Bladder and rectum are intact. Peritoneum/Retroperitoneum: Nonspecific stranding seen in the mesentery most notably in the retroperitoneum.  Multiple calcified mesenteric lymph nodes. No free fluid. No suspicious lymphadenopathy.  No evidence of pneumoperitoneum. Bones/Soft Tissues: The abdominal and pelvic walls are unremarkable. Degenerative changes seen in the visualized spine .  No acute bony abnormalities. Vascular calcifications are seen compatible with atherosclerotic disease.     1. Multiple enlarged lymph nodes seen in the middle mediastinum and bandar, multiple which are partially calcified. 2. Several subcentimeter noncalcified nodules scattered throughout the lungs. Metastatic disease cannot be excluded.  Mild bibasal bronchiectasis more notably on the right. 3. Splenomegaly. 4. Cholelithiasis. 5. 6 mm nonobstructing left renal stone. 6. Prostatic enlargement and calculi. 7. Nonspecific stranding seen in the mesentery most notably in the retroperitoneum. Multiple calcified mesenteric lymph nodes. 8. Trace pericardial effusion. 9. Limited exam due to lack of IV contrast         Consultations:    Consults:     Final Specialist Recommendations/Findings:   IP CONSULT TO SPIRITUAL SERVICES  IP CONSULT TO HEM/ONC  IP CONSULT TO

## 2024-07-05 NOTE — PROGRESS NOTES
clubbing or cyanosis   Skin -  petechiae and extensive bruising    DATA:    Labs:   CBC:   Recent Labs     07/04/24  0420 07/05/24  0655   WBC 4.0 4.0   HGB 9.8* 10.1*   HCT 31.3* 32.8*   PLT See Reflexed IPF Result See Reflexed IPF Result       BMP:   Recent Labs     07/04/24  0420 07/05/24  0655    139   K 4.1 3.9   CO2 17* 19*   BUN 28* 34*   CREATININE 1.3* 1.3*   LABGLOM 59* 57*   GLUCOSE 168* 84         PT/INR:   Recent Labs     07/03/24  0454   PROTIME 15.3*   INR 1.2           IMAGING DATA:  IMPRESSION:  1. Multiple enlarged lymph nodes seen in the middle mediastinum and bandar,  multiple which are partially calcified.  2. Several subcentimeter noncalcified nodules scattered throughout the lungs.  Metastatic disease cannot be excluded.  Mild bibasal bronchiectasis more  notably on the right.  3. Splenomegaly.  4. Cholelithiasis.  5. 6 mm nonobstructing left renal stone.  6. Prostatic enlargement and calculi.  7. Nonspecific stranding seen in the mesentery most notably in the  retroperitoneum. Multiple calcified mesenteric lymph nodes.  8. Trace pericardial effusion.  9. Limited exam due to lack of IV contrast    Primary Problem  Idiopathic thrombocytopenic purpura (HCC)    Active Hospital Problems    Diagnosis Date Noted    Idiopathic thrombocytopenic purpura (HCC) [D69.3] 07/02/2024    Splenomegaly [R16.1] 07/02/2024    Mediastinal adenopathy [R59.0] 07/02/2024    Hilar adenopathy [R59.0] 07/02/2024    PVC's (premature ventricular contractions) [I49.3] 06/18/2024    Lung nodules [R91.8] 06/18/2024    H/O: stroke [Z86.73] 06/18/2024    Lymphadenopathy [R59.1] 06/18/2024    CKD stage 3b, GFR 30-44 ml/min (HCC) [N18.32] 06/15/2024    Primary hypertension [I10] 05/16/2016     IMPRESSION:   Severe thrombocytopenia  Clinical workup suggests ITP  TTP and DIC and other consumptive thrombocytopenia is highly unlikely based on the workup done so far  Evaluation of the smear did not show many

## 2024-07-05 NOTE — PROGRESS NOTES
Cardiology Progress Note                     Date:   7/5/2024  Patient name: Bruno Morales  Date of admission:  7/2/2024  9:02 PM  MRN:   [id number]  YOB: 1950  PCP: Margarette Vallejo MD    Reason for Admission:  severe thrombocytopenia     Subjective:       There were no acute events overnight, remained hemodynamically stable, denies chest pain, dyspnea, orthopnea or palpitations. He continues to have frequent PVCs. Had bone marrow biopsy. Platelets improved top 35 K today.       Scheduled Meds:   magnesium oxide  400 mg Oral Daily    fenofibrate  54 mg Oral Daily    brimonidine  1 drop Right Eye BID    carvedilol  25 mg Oral BID WC    dilTIAZem  60 mg Oral 3 times per day    lisinopril  40 mg Oral Daily    pantoprazole  40 mg Oral QAM AC    atorvastatin  20 mg Oral Daily    sodium chloride flush  5-40 mL IntraVENous 2 times per day    cetirizine  10 mg Oral Nightly       Continuous Infusions:   sodium chloride 75 mL/hr at 07/04/24 1924    sodium chloride         Labs:     CBC:   Recent Labs     07/04/24  0045 07/04/24  0420 07/05/24  0655   WBC 4.7 4.0 4.0   HGB 10.0* 9.8* 10.1*   PLT See Reflexed IPF Result See Reflexed IPF Result See Reflexed IPF Result     BMP:    Recent Labs     07/03/24  0454 07/04/24  0420 07/05/24  0655    140 139   K 3.5* 4.1 3.9   * 114* 114*   CO2 18* 17* 19*   BUN 25* 28* 34*   CREATININE 1.2 1.3* 1.3*   GLUCOSE 156* 168* 84     Hepatic: No results for input(s): \"AST\", \"ALT\", \"BILITOT\", \"ALKPHOS\" in the last 72 hours.    Invalid input(s): \"ALB\"  Troponin: No results for input(s): \"TROPONINI\" in the last 72 hours.  BNP: No results for input(s): \"BNP\" in the last 72 hours.  Lipids: No results for input(s): \"CHOL\", \"HDL\" in the last 72 hours.    Invalid input(s): \"LDLCALCU\"  INR:   Recent Labs     07/03/24  0454   INR 1.2         Objective:     Vitals: BP (!) 150/72   Pulse 60   Temp 97.5 °F (36.4 °C) (Oral)   Resp 17   Ht 1.778 m (5' 10\")

## 2024-07-08 ENCOUNTER — HOSPITAL ENCOUNTER (OUTPATIENT)
Age: 74
Discharge: HOME | End: 2024-07-08
Payer: MEDICARE

## 2024-07-08 DIAGNOSIS — D69.3 ACUTE IDIOPATHIC THROMBOCYTOPENIC PURPURA (HCC): Primary | ICD-10-CM

## 2024-07-08 DIAGNOSIS — D69.3 ACUTE IDIOPATHIC THROMBOCYTOPENIC PURPURA (HCC): ICD-10-CM

## 2024-07-08 LAB
ALBUMIN SERPL-MCNC: 3.1 G/DL (ref 3.5–5.2)
ALBUMIN/GLOB SERPL: 0.7 {RATIO} (ref 1–2.5)
ALP SERPL-CCNC: 46 U/L (ref 40–129)
ALT SERPL-CCNC: 16 U/L (ref 5–41)
ANION GAP SERPL CALCULATED.3IONS-SCNC: 7 MMOL/L (ref 9–17)
AST SERPL-CCNC: 19 U/L
BASOPHILS # BLD: 0 K/UL (ref 0–0.2)
BASOPHILS NFR BLD: 0 % (ref 0–2)
BILIRUB SERPL-MCNC: 0.3 MG/DL (ref 0.3–1.2)
BUN SERPL-MCNC: 24 MG/DL (ref 8–23)
CALCIUM SERPL-MCNC: 8.5 MG/DL (ref 8.6–10.4)
CHLORIDE SERPL-SCNC: 106 MMOL/L (ref 98–107)
CO2 SERPL-SCNC: 25 MMOL/L (ref 20–31)
CREAT SERPL-MCNC: 1.4 MG/DL (ref 0.7–1.2)
EOSINOPHIL # BLD: 0.18 K/UL (ref 0–0.4)
EOSINOPHILS RELATIVE PERCENT: 5 % (ref 1–4)
ERYTHROCYTE [DISTWIDTH] IN BLOOD BY AUTOMATED COUNT: 14.5 % (ref 12.5–15.4)
GFR, ESTIMATED: 53 ML/MIN/1.73M2
GLUCOSE SERPL-MCNC: 153 MG/DL (ref 70–99)
HBV SURFACE AG SERPL QL IA: NONREACTIVE
HCT VFR BLD AUTO: 34 % (ref 41–53)
HGB BLD-MCNC: 11.2 G/DL (ref 13.5–17.5)
LYMPHOCYTES NFR BLD: 0.21 K/UL (ref 1–4.8)
LYMPHOCYTES RELATIVE PERCENT: 6 % (ref 24–44)
MCH RBC QN AUTO: 28.5 PG (ref 26–34)
MCHC RBC AUTO-ENTMCNC: 32.8 G/DL (ref 31–37)
MCV RBC AUTO: 86.6 FL (ref 80–100)
MONOCYTES NFR BLD: 0.14 K/UL (ref 0.1–0.8)
MONOCYTES NFR BLD: 4 % (ref 1–7)
MORPHOLOGY: ABNORMAL
NEUTROPHILS NFR BLD: 85 % (ref 36–66)
NEUTS SEG NFR BLD: 2.97 K/UL (ref 1.8–7.7)
PLATELET # BLD AUTO: 56 K/UL (ref 140–450)
PMV BLD AUTO: 10.8 FL (ref 6–12)
POTASSIUM SERPL-SCNC: 3.8 MMOL/L (ref 3.7–5.3)
PROT SERPL-MCNC: 7.7 G/DL (ref 6.4–8.3)
RBC # BLD AUTO: 3.92 M/UL (ref 4.5–5.9)
SODIUM SERPL-SCNC: 138 MMOL/L (ref 135–144)
WBC OTHER # BLD: 3.5 K/UL (ref 3.5–11)

## 2024-07-08 PROCEDURE — 85025 COMPLETE CBC W/AUTO DIFF WBC: CPT

## 2024-07-08 PROCEDURE — 36415 COLL VENOUS BLD VENIPUNCTURE: CPT

## 2024-07-08 PROCEDURE — 80053 COMPREHEN METABOLIC PANEL: CPT

## 2024-07-08 PROCEDURE — 87340 HEPATITIS B SURFACE AG IA: CPT

## 2024-07-09 ENCOUNTER — TELEPHONE (OUTPATIENT)
Dept: ONCOLOGY | Age: 74
End: 2024-07-09

## 2024-07-09 ENCOUNTER — OFFICE VISIT (OUTPATIENT)
Dept: ONCOLOGY | Age: 74
End: 2024-07-09
Payer: MEDICARE

## 2024-07-09 VITALS
HEIGHT: 70 IN | OXYGEN SATURATION: 96 % | SYSTOLIC BLOOD PRESSURE: 158 MMHG | TEMPERATURE: 96.8 F | WEIGHT: 155.2 LBS | BODY MASS INDEX: 22.22 KG/M2 | HEART RATE: 60 BPM | RESPIRATION RATE: 18 BRPM | DIASTOLIC BLOOD PRESSURE: 88 MMHG

## 2024-07-09 DIAGNOSIS — D69.3 ACUTE IDIOPATHIC THROMBOCYTOPENIC PURPURA (HCC): Primary | ICD-10-CM

## 2024-07-09 LAB — BONE MARROW REPORT: NORMAL

## 2024-07-09 PROCEDURE — 99215 OFFICE O/P EST HI 40 MIN: CPT | Performed by: INTERNAL MEDICINE

## 2024-07-09 PROCEDURE — 3078F DIAST BP <80 MM HG: CPT | Performed by: INTERNAL MEDICINE

## 2024-07-09 PROCEDURE — 99211 OFF/OP EST MAY X REQ PHY/QHP: CPT | Performed by: INTERNAL MEDICINE

## 2024-07-09 PROCEDURE — 1123F ACP DISCUSS/DSCN MKR DOCD: CPT | Performed by: INTERNAL MEDICINE

## 2024-07-09 PROCEDURE — 3077F SYST BP >= 140 MM HG: CPT | Performed by: INTERNAL MEDICINE

## 2024-07-09 NOTE — PATIENT INSTRUCTIONS
Proceed with Rituxan once approved, need three weekly doses. Rv to see me with the last infusion  Need cbc weekly on rituxan days

## 2024-07-09 NOTE — TELEPHONE ENCOUNTER
Instructions   from Dr. Bia Jaime MD    Proceed with Rituxan once approved, need three weekly doses. Rv to see me with the last infusion  Need cbc weekly on rituxan days     Patient will be scheduled once approved.

## 2024-07-09 NOTE — PROGRESS NOTES
Respiratory: negative for cough , sputum, dyspnea, wheezing, hemoptysis, chest pain   Cardiovascular: negative for chest pain, dyspnea, palpitations, orthopnea, PND   Gastrointestinal: negative for nausea, vomiting, diarrhea, constipation, abdominal pain, Dysphagia, hematemesis and hematochezia   Genitourinary: negative for frequency, dysuria, nocturia, urinary incontinence, and hematuria   Integument: Petechiae and bruises as discussed  Hematologic/Lymphatic: negative for easy bruising, bleeding, lymphadenopathy, or petechiae   Endocrine: negative for heat or cold intolerance,weight changes, change in bowel habits and hair loss   Musculoskeletal: negative for myalgias, arthralgias, pain, joint swelling,and bone pain   Neurological: negative for headaches, dizziness, seizures, weakness, numbness    PHYSICAL EXAM:      BP (!) 158/88   Pulse 60   Temp 96.8 °F (36 °C) (Temporal)   Resp 18   Ht 1.778 m (5' 10\")   Wt 70.4 kg (155 lb 3.2 oz)   SpO2 96%   BMI 22.27 kg/m²    Temp (24hrs), Av.6 °F (36.4 °C), Min:97.5 °F (36.4 °C), Max:97.7 °F (36.5 °C)    General appearance - well appearing, no in pain or distress   Mental status - alert and cooperative   Eyes - pupils equal and reactive, extraocular eye movements intact   Ears - bilateral TM's and external ear canals normal   Mouth - mucous membranes moist, pharynx normal without lesions   Neck - supple, no significant adenopathy   Lymphatics - no palpable lymphadenopathy, no hepatosplenomegaly   Chest - clear to auscultation, no wheezes, rales or rhonchi, symmetric air entry   Heart - normal rate, regular rhythm, normal S1, S2, no murmurs  Abdomen - soft, nontender, nondistended, no masses or organomegaly   Neurological - alert, oriented, normal speech, no focal findings or movement disorder noted   Musculoskeletal - no joint tenderness, deformity or swelling   Extremities - peripheral pulses normal, no pedal edema, no clubbing or cyanosis   Skin -  petechiae

## 2024-07-11 LAB — FLOW CYTOMETRY, BM: NORMAL

## 2024-07-12 LAB — CHROMOSOME STUDY: NORMAL

## 2024-07-17 ENCOUNTER — CLINICAL DOCUMENTATION (OUTPATIENT)
Facility: HOSPITAL | Age: 74
End: 2024-07-17

## 2024-07-17 NOTE — PROGRESS NOTES
Patient Assistance    RUXIENCE reviewed; no assistance available at this time.

## 2024-07-18 ENCOUNTER — HOSPITAL ENCOUNTER (OUTPATIENT)
Dept: INFUSION THERAPY | Age: 74
Discharge: HOME | End: 2024-07-18
Payer: MEDICARE

## 2024-07-18 VITALS
WEIGHT: 155 LBS | TEMPERATURE: 97.6 F | SYSTOLIC BLOOD PRESSURE: 152 MMHG | RESPIRATION RATE: 16 BRPM | HEART RATE: 66 BPM | BODY MASS INDEX: 22.24 KG/M2 | DIASTOLIC BLOOD PRESSURE: 77 MMHG

## 2024-07-18 DIAGNOSIS — D69.3 ACUTE IDIOPATHIC THROMBOCYTOPENIC PURPURA (HCC): Primary | ICD-10-CM

## 2024-07-18 LAB
BASOPHILS # BLD: 0 K/UL (ref 0–0.2)
BASOPHILS NFR BLD: 0 % (ref 0–2)
EOSINOPHIL # BLD: 0.14 K/UL (ref 0–0.4)
EOSINOPHILS RELATIVE PERCENT: 4 % (ref 1–4)
ERYTHROCYTE [DISTWIDTH] IN BLOOD BY AUTOMATED COUNT: 13.8 % (ref 12.5–15.4)
HCT VFR BLD AUTO: 34.3 % (ref 41–53)
HGB BLD-MCNC: 11.4 G/DL (ref 13.5–17.5)
LYMPHOCYTES NFR BLD: 0.47 K/UL (ref 1–4.8)
LYMPHOCYTES RELATIVE PERCENT: 13 % (ref 24–44)
MCH RBC QN AUTO: 27.8 PG (ref 26–34)
MCHC RBC AUTO-ENTMCNC: 33.3 G/DL (ref 31–37)
MCV RBC AUTO: 83.5 FL (ref 80–100)
METAMYELOCYTES ABSOLUTE COUNT: 0.04 K/UL
METAMYELOCYTES: 1 %
MONOCYTES NFR BLD: 0.43 K/UL (ref 0.1–0.8)
MONOCYTES NFR BLD: 12 % (ref 1–7)
MORPHOLOGY: NORMAL
NEUTROPHILS NFR BLD: 70 % (ref 36–66)
NEUTS SEG NFR BLD: 2.52 K/UL (ref 1.8–7.7)
PLATELET # BLD AUTO: 27 K/UL (ref 140–450)
PMV BLD AUTO: 12.4 FL (ref 6–12)
RBC # BLD AUTO: 4.11 M/UL (ref 4.5–5.9)
WBC OTHER # BLD: 3.6 K/UL (ref 3.5–11)

## 2024-07-18 PROCEDURE — 96415 CHEMO IV INFUSION ADDL HR: CPT

## 2024-07-18 PROCEDURE — 6370000000 HC RX 637 (ALT 250 FOR IP): Performed by: INTERNAL MEDICINE

## 2024-07-18 PROCEDURE — 96413 CHEMO IV INFUSION 1 HR: CPT

## 2024-07-18 PROCEDURE — 6360000002 HC RX W HCPCS: Performed by: INTERNAL MEDICINE

## 2024-07-18 PROCEDURE — 36415 COLL VENOUS BLD VENIPUNCTURE: CPT

## 2024-07-18 PROCEDURE — 96375 TX/PRO/DX INJ NEW DRUG ADDON: CPT

## 2024-07-18 PROCEDURE — 2580000003 HC RX 258: Performed by: INTERNAL MEDICINE

## 2024-07-18 PROCEDURE — 85025 COMPLETE CBC W/AUTO DIFF WBC: CPT

## 2024-07-18 RX ORDER — DIPHENHYDRAMINE HYDROCHLORIDE 50 MG/ML
25 INJECTION INTRAMUSCULAR; INTRAVENOUS ONCE
Status: COMPLETED | OUTPATIENT
Start: 2024-07-18 | End: 2024-07-18

## 2024-07-18 RX ORDER — SODIUM CHLORIDE 9 MG/ML
5-250 INJECTION, SOLUTION INTRAVENOUS PRN
Status: DISCONTINUED | OUTPATIENT
Start: 2024-07-18 | End: 2024-07-19 | Stop reason: HOSPADM

## 2024-07-18 RX ORDER — ACETAMINOPHEN 325 MG/1
650 TABLET ORAL ONCE
Status: COMPLETED | OUTPATIENT
Start: 2024-07-18 | End: 2024-07-18

## 2024-07-18 RX ADMIN — METHYLPREDNISOLONE SODIUM SUCCINATE 125 MG: 125 INJECTION, POWDER, FOR SOLUTION INTRAMUSCULAR; INTRAVENOUS at 08:31

## 2024-07-18 RX ADMIN — SODIUM CHLORIDE 150 ML/HR: 9 INJECTION, SOLUTION INTRAVENOUS at 08:29

## 2024-07-18 RX ADMIN — ACETAMINOPHEN 650 MG: 325 TABLET ORAL at 08:33

## 2024-07-18 RX ADMIN — DIPHENHYDRAMINE HYDROCHLORIDE 25 MG: 50 INJECTION INTRAMUSCULAR; INTRAVENOUS at 08:29

## 2024-07-18 RX ADMIN — SODIUM CHLORIDE 700 MG: 9 INJECTION, SOLUTION INTRAVENOUS at 09:29

## 2024-07-18 NOTE — PROGRESS NOTES
Patient arrived for ruxience 1/3. Infusion completed without issue. Pt stable at discharge. Scheduled to return 7/25/24 for ruxience 2/3.

## 2024-07-25 ENCOUNTER — HOSPITAL ENCOUNTER (OUTPATIENT)
Dept: INFUSION THERAPY | Age: 74
Discharge: HOME | End: 2024-07-25
Payer: MEDICARE

## 2024-07-25 VITALS
HEART RATE: 56 BPM | RESPIRATION RATE: 16 BRPM | SYSTOLIC BLOOD PRESSURE: 149 MMHG | DIASTOLIC BLOOD PRESSURE: 73 MMHG | TEMPERATURE: 97.4 F

## 2024-07-25 DIAGNOSIS — D69.3 ACUTE IDIOPATHIC THROMBOCYTOPENIC PURPURA (HCC): Primary | ICD-10-CM

## 2024-07-25 LAB
BASOPHILS # BLD: 0 K/UL (ref 0–0.2)
BASOPHILS NFR BLD: 0 % (ref 0–2)
EOSINOPHIL # BLD: 0.06 K/UL (ref 0–0.4)
EOSINOPHILS RELATIVE PERCENT: 1 % (ref 1–4)
ERYTHROCYTE [DISTWIDTH] IN BLOOD BY AUTOMATED COUNT: 13.5 % (ref 12.5–15.4)
HCT VFR BLD AUTO: 37.3 % (ref 41–53)
HGB BLD-MCNC: 12.3 G/DL (ref 13.5–17.5)
LYMPHOCYTES NFR BLD: 0.57 K/UL (ref 1–4.8)
LYMPHOCYTES RELATIVE PERCENT: 9 % (ref 24–44)
MCH RBC QN AUTO: 27.8 PG (ref 26–34)
MCHC RBC AUTO-ENTMCNC: 33 G/DL (ref 31–37)
MCV RBC AUTO: 84.2 FL (ref 80–100)
MONOCYTES NFR BLD: 0.63 K/UL (ref 0.1–0.8)
MONOCYTES NFR BLD: 10 % (ref 1–7)
MORPHOLOGY: NORMAL
NEUTROPHILS NFR BLD: 80 % (ref 36–66)
NEUTS SEG NFR BLD: 5.04 K/UL (ref 1.8–7.7)
PLATELET # BLD AUTO: 36 K/UL (ref 140–450)
RBC # BLD AUTO: 4.43 M/UL (ref 4.5–5.9)
WBC OTHER # BLD: 6.3 K/UL (ref 3.5–11)

## 2024-07-25 PROCEDURE — 36415 COLL VENOUS BLD VENIPUNCTURE: CPT

## 2024-07-25 PROCEDURE — 96375 TX/PRO/DX INJ NEW DRUG ADDON: CPT

## 2024-07-25 PROCEDURE — 96415 CHEMO IV INFUSION ADDL HR: CPT

## 2024-07-25 PROCEDURE — 96413 CHEMO IV INFUSION 1 HR: CPT

## 2024-07-25 PROCEDURE — 85025 COMPLETE CBC W/AUTO DIFF WBC: CPT

## 2024-07-25 PROCEDURE — 6370000000 HC RX 637 (ALT 250 FOR IP): Performed by: INTERNAL MEDICINE

## 2024-07-25 PROCEDURE — 2580000003 HC RX 258: Performed by: INTERNAL MEDICINE

## 2024-07-25 PROCEDURE — 6360000002 HC RX W HCPCS: Performed by: INTERNAL MEDICINE

## 2024-07-25 RX ORDER — SODIUM CHLORIDE 9 MG/ML
5-250 INJECTION, SOLUTION INTRAVENOUS PRN
Status: DISCONTINUED | OUTPATIENT
Start: 2024-07-25 | End: 2024-07-26 | Stop reason: HOSPADM

## 2024-07-25 RX ORDER — ACETAMINOPHEN 325 MG/1
650 TABLET ORAL ONCE
Status: COMPLETED | OUTPATIENT
Start: 2024-07-25 | End: 2024-07-25

## 2024-07-25 RX ORDER — DIPHENHYDRAMINE HYDROCHLORIDE 50 MG/ML
25 INJECTION INTRAMUSCULAR; INTRAVENOUS ONCE
Status: COMPLETED | OUTPATIENT
Start: 2024-07-25 | End: 2024-07-25

## 2024-07-25 RX ADMIN — SODIUM CHLORIDE 700 MG: 9 INJECTION, SOLUTION INTRAVENOUS at 09:25

## 2024-07-25 RX ADMIN — SODIUM CHLORIDE 150 ML/HR: 9 INJECTION, SOLUTION INTRAVENOUS at 08:33

## 2024-07-25 RX ADMIN — DIPHENHYDRAMINE HYDROCHLORIDE 25 MG: 50 INJECTION INTRAMUSCULAR; INTRAVENOUS at 08:33

## 2024-07-25 RX ADMIN — METHYLPREDNISOLONE SODIUM SUCCINATE 125 MG: 125 INJECTION, POWDER, FOR SOLUTION INTRAMUSCULAR; INTRAVENOUS at 08:33

## 2024-07-25 RX ADMIN — ACETAMINOPHEN 650 MG: 325 TABLET ORAL at 08:32

## 2024-07-25 NOTE — PROGRESS NOTES
Patient arrived for rituxan 2 of 3.  Arrives ambulatory.  Denies any complaints.  Labs drawn, results reviewed.  Treatment tolerated without incident.  Discharged in stable condition.  Returns 8/01/2024 for office visit and rituxan 3 of 3.

## 2024-08-01 ENCOUNTER — HOSPITAL ENCOUNTER (OUTPATIENT)
Dept: INFUSION THERAPY | Age: 74
Discharge: HOME | End: 2024-08-01
Payer: MEDICARE

## 2024-08-01 ENCOUNTER — OFFICE VISIT (OUTPATIENT)
Dept: ONCOLOGY | Age: 74
End: 2024-08-01
Payer: MEDICARE

## 2024-08-01 ENCOUNTER — TELEPHONE (OUTPATIENT)
Dept: ONCOLOGY | Age: 74
End: 2024-08-01

## 2024-08-01 VITALS
HEART RATE: 58 BPM | SYSTOLIC BLOOD PRESSURE: 137 MMHG | RESPIRATION RATE: 18 BRPM | TEMPERATURE: 97 F | WEIGHT: 156.2 LBS | BODY MASS INDEX: 22.41 KG/M2 | DIASTOLIC BLOOD PRESSURE: 80 MMHG | OXYGEN SATURATION: 96 %

## 2024-08-01 DIAGNOSIS — D69.3 ACUTE IDIOPATHIC THROMBOCYTOPENIC PURPURA (HCC): Primary | ICD-10-CM

## 2024-08-01 DIAGNOSIS — R59.1 LYMPHADENOPATHY: ICD-10-CM

## 2024-08-01 DIAGNOSIS — R91.8 LUNG NODULES: ICD-10-CM

## 2024-08-01 LAB
BASOPHILS # BLD: 0.07 K/UL (ref 0–0.2)
BASOPHILS NFR BLD: 1 % (ref 0–2)
EOSINOPHIL # BLD: 0.28 K/UL (ref 0–0.4)
EOSINOPHILS RELATIVE PERCENT: 4 % (ref 1–4)
ERYTHROCYTE [DISTWIDTH] IN BLOOD BY AUTOMATED COUNT: 14.2 % (ref 12.5–15.4)
HCT VFR BLD AUTO: 35.8 % (ref 41–53)
HGB BLD-MCNC: 11.9 G/DL (ref 13.5–17.5)
LYMPHOCYTES NFR BLD: 0.64 K/UL (ref 1–4.8)
LYMPHOCYTES RELATIVE PERCENT: 9 % (ref 24–44)
MCH RBC QN AUTO: 27.4 PG (ref 26–34)
MCHC RBC AUTO-ENTMCNC: 33.1 G/DL (ref 31–37)
MCV RBC AUTO: 82.6 FL (ref 80–100)
MONOCYTES NFR BLD: 0.71 K/UL (ref 0.1–1.2)
MONOCYTES NFR BLD: 10 % (ref 2–11)
MORPHOLOGY: NORMAL
NEUTROPHILS NFR BLD: 76 % (ref 36–66)
NEUTS SEG NFR BLD: 5.4 K/UL (ref 1.8–7.7)
PLATELET # BLD AUTO: 41 K/UL (ref 140–450)
PMV BLD AUTO: 12.6 FL (ref 6–12)
RBC # BLD AUTO: 4.34 M/UL (ref 4.5–5.9)
WBC OTHER # BLD: 7.1 K/UL (ref 3.5–11)

## 2024-08-01 PROCEDURE — 2580000003 HC RX 258: Performed by: INTERNAL MEDICINE

## 2024-08-01 PROCEDURE — 1123F ACP DISCUSS/DSCN MKR DOCD: CPT | Performed by: INTERNAL MEDICINE

## 2024-08-01 PROCEDURE — 36415 COLL VENOUS BLD VENIPUNCTURE: CPT

## 2024-08-01 PROCEDURE — 6360000002 HC RX W HCPCS: Performed by: INTERNAL MEDICINE

## 2024-08-01 PROCEDURE — 99214 OFFICE O/P EST MOD 30 MIN: CPT | Performed by: INTERNAL MEDICINE

## 2024-08-01 PROCEDURE — 6370000000 HC RX 637 (ALT 250 FOR IP): Performed by: INTERNAL MEDICINE

## 2024-08-01 PROCEDURE — 96375 TX/PRO/DX INJ NEW DRUG ADDON: CPT

## 2024-08-01 PROCEDURE — 96413 CHEMO IV INFUSION 1 HR: CPT

## 2024-08-01 PROCEDURE — 99211 OFF/OP EST MAY X REQ PHY/QHP: CPT | Performed by: INTERNAL MEDICINE

## 2024-08-01 PROCEDURE — 3079F DIAST BP 80-89 MM HG: CPT | Performed by: INTERNAL MEDICINE

## 2024-08-01 PROCEDURE — 85025 COMPLETE CBC W/AUTO DIFF WBC: CPT

## 2024-08-01 PROCEDURE — 96415 CHEMO IV INFUSION ADDL HR: CPT

## 2024-08-01 PROCEDURE — 3075F SYST BP GE 130 - 139MM HG: CPT | Performed by: INTERNAL MEDICINE

## 2024-08-01 RX ORDER — ACETAMINOPHEN 325 MG/1
650 TABLET ORAL ONCE
Status: COMPLETED | OUTPATIENT
Start: 2024-08-01 | End: 2024-08-01

## 2024-08-01 RX ORDER — DIPHENHYDRAMINE HYDROCHLORIDE 50 MG/ML
25 INJECTION INTRAMUSCULAR; INTRAVENOUS ONCE
Status: COMPLETED | OUTPATIENT
Start: 2024-08-01 | End: 2024-08-01

## 2024-08-01 RX ORDER — SODIUM CHLORIDE 9 MG/ML
5-250 INJECTION, SOLUTION INTRAVENOUS PRN
Status: DISCONTINUED | OUTPATIENT
Start: 2024-08-01 | End: 2024-08-02 | Stop reason: HOSPADM

## 2024-08-01 RX ADMIN — METHYLPREDNISOLONE SODIUM SUCCINATE 125 MG: 125 INJECTION, POWDER, FOR SOLUTION INTRAMUSCULAR; INTRAVENOUS at 09:16

## 2024-08-01 RX ADMIN — SODIUM CHLORIDE 700 MG: 9 INJECTION, SOLUTION INTRAVENOUS at 09:46

## 2024-08-01 RX ADMIN — ACETAMINOPHEN 650 MG: 325 TABLET ORAL at 09:17

## 2024-08-01 RX ADMIN — SODIUM CHLORIDE 120 ML/HR: 9 INJECTION, SOLUTION INTRAVENOUS at 09:16

## 2024-08-01 RX ADMIN — DIPHENHYDRAMINE HYDROCHLORIDE 25 MG: 50 INJECTION INTRAMUSCULAR; INTRAVENOUS at 09:16

## 2024-08-01 NOTE — PROGRESS NOTES
reviewed the bone marrow results with the patient, no primary disorder seen. Dx of ITP is confirmed   We will finish the infusions.  We will repeat CT scan and we will check send weekly CBC  If his platelets are under 50,000, plan to transition to Promacta    Bia Jaime MD  Hematologist/Medical Oncologist  Cell: [phone]

## 2024-08-01 NOTE — TELEPHONE ENCOUNTER
Instructions   from Dr. Bia Jaime MD    Finished the last infusion of rituximab today  Needs CBC weekly  X 4 weeks, need cmp on CT scan day   Need CT scan in 2 to 3 weeks (see if you can coordinater with weekly labs)   Return after the CT scan      Last Tx today  CBC weekly  Cbc with CT on 8/22/24 at 1 pm with arrival time of 12 pm at [place]  RV 9/5/24 at 11:30 am

## 2024-08-01 NOTE — PROGRESS NOTES
Spiritual Health Outpatient Oncology/Hematology Progress Note    Situation: Writer visited with Patient and Spouse in the treatment cubicle of the infusion clinic.     Assessment: Patient and Spouse smiled and greeted writer. They shared that they had met  [place] when Pt was in the hospital and that they appreciated his visits. Pt noted that he is here for a specific condition and length of time.     Intervention: Writer introduced herself and her services. Writer inquired about Pt's coping and needs. Writer explored Pt's sources of support and strength. Writer offered supportive presence and active listening. Writer affirmed Pt's strengths. Writer offered words of support and encouragement. Writer shared resources with Pt. Writer prayed for and with Pt.    Outcome: Patient expressed their feelings and needs. Pt named their sources of support. Pt was receptive to resources. Pt was open to receiving prayer and voiced their prayer needs. Pt thanked writer for the visit.    Plan: Spiritual Health Services are available for Patient and Family by phone and/or in person. Writer will route note to for follow up care.        08/01/24 1235   Encounter Summary   Encounter Overview/Reason Spiritual/Emotional Needs   Service Provided For Patient and family together   Referral/Consult From [facility]   Support System Spouse   Last Encounter  08/01/24   Complexity of Encounter Low   Begin Time 1140   End Time  1150   Total Time Calculated 10 min   Spiritual/Emotional needs   Type Spiritual Support   Assessment/Intervention/Outcome   Assessment Calm;Coping   Intervention Sustaining Presence/Ministry of presence;Active listening;Explored/Affirmed feelings, thoughts, concerns;Discussed illness injury and it’s impact;Explored Coping Skills/Resources;Discussed belief system/Bahai practices/joseline   Outcome Expressed Gratitude;Expressed feelings, needs, and concerns;Engaged in conversation;Coping   Plan and Referrals

## 2024-08-01 NOTE — PROGRESS NOTES
Patient arrives ambulatory for Rituxan - Last dose   Pt seen by Dr Ely  , Orders to proceed with tx - see note  Labs drawn reviewed, within normal limits for tx  Patient tolerated tx without incident and discharged in stable condition  Next appointment 9/5 MD visit / verified with pt and spouse weekly CBC x 4

## 2024-08-01 NOTE — PATIENT INSTRUCTIONS
Finished the last infusion of rituximab today  Needs CBC weekly  X 4 weeks, need cmp on CT scan day   Need CT scan in 2 to 3 weeks (see if you can coordinater with weekly labs)   Return after the CT scan

## 2024-08-08 ENCOUNTER — HOSPITAL ENCOUNTER (OUTPATIENT)
Age: 74
Discharge: HOME | End: 2024-08-08
Payer: MEDICARE

## 2024-08-08 DIAGNOSIS — D69.3 ACUTE IDIOPATHIC THROMBOCYTOPENIC PURPURA (HCC): ICD-10-CM

## 2024-08-08 LAB
BASOPHILS # BLD: 0 K/UL (ref 0–0.2)
BASOPHILS NFR BLD: 0 % (ref 0–2)
EOSINOPHIL # BLD: 0.29 K/UL (ref 0–0.4)
EOSINOPHILS RELATIVE PERCENT: 4 % (ref 1–4)
ERYTHROCYTE [DISTWIDTH] IN BLOOD BY AUTOMATED COUNT: 14.7 % (ref 12.5–15.4)
HCT VFR BLD AUTO: 38.1 % (ref 41–53)
HGB BLD-MCNC: 12.5 G/DL (ref 13.5–17.5)
LYMPHOCYTES NFR BLD: 0.86 K/UL (ref 1–4.8)
LYMPHOCYTES RELATIVE PERCENT: 12 % (ref 24–44)
MCH RBC QN AUTO: 26.8 PG (ref 26–34)
MCHC RBC AUTO-ENTMCNC: 32.7 G/DL (ref 31–37)
MCV RBC AUTO: 81.8 FL (ref 80–100)
MONOCYTES NFR BLD: 0.72 K/UL (ref 0.1–0.8)
MONOCYTES NFR BLD: 10 % (ref 1–7)
MORPHOLOGY: NORMAL
NEUTROPHILS NFR BLD: 74 % (ref 36–66)
NEUTS SEG NFR BLD: 5.33 K/UL (ref 1.8–7.7)
PLATELET # BLD AUTO: 46 K/UL (ref 140–450)
PMV BLD AUTO: 11.4 FL (ref 6–12)
RBC # BLD AUTO: 4.66 M/UL (ref 4.5–5.9)
WBC OTHER # BLD: 7.2 K/UL (ref 3.5–11)

## 2024-08-08 PROCEDURE — 85025 COMPLETE CBC W/AUTO DIFF WBC: CPT

## 2024-08-08 PROCEDURE — 36415 COLL VENOUS BLD VENIPUNCTURE: CPT

## 2024-08-15 ENCOUNTER — HOSPITAL ENCOUNTER (OUTPATIENT)
Age: 74
Discharge: HOME | End: 2024-08-15
Payer: MEDICARE

## 2024-08-15 DIAGNOSIS — D69.3 ACUTE IDIOPATHIC THROMBOCYTOPENIC PURPURA (HCC): ICD-10-CM

## 2024-08-15 LAB
BASOPHILS # BLD: 0.1 K/UL (ref 0–0.2)
BASOPHILS NFR BLD: 1 % (ref 0–2)
EOSINOPHIL # BLD: 0.3 K/UL (ref 0–0.4)
EOSINOPHILS RELATIVE PERCENT: 6 % (ref 1–4)
ERYTHROCYTE [DISTWIDTH] IN BLOOD BY AUTOMATED COUNT: 14.7 % (ref 12.5–15.4)
HCT VFR BLD AUTO: 37.3 % (ref 41–53)
HGB BLD-MCNC: 12.5 G/DL (ref 13.5–17.5)
LYMPHOCYTES NFR BLD: 0.5 K/UL (ref 1–4.8)
LYMPHOCYTES RELATIVE PERCENT: 9 % (ref 24–44)
MCH RBC QN AUTO: 26.9 PG (ref 26–34)
MCHC RBC AUTO-ENTMCNC: 33.4 G/DL (ref 31–37)
MCV RBC AUTO: 80.5 FL (ref 80–100)
MONOCYTES NFR BLD: 0.8 K/UL (ref 0.1–1.2)
MONOCYTES NFR BLD: 13 % (ref 2–11)
NEUTROPHILS NFR BLD: 71 % (ref 36–66)
NEUTS SEG NFR BLD: 4.4 K/UL (ref 1.8–7.7)
PLATELET # BLD AUTO: 45 K/UL (ref 140–450)
PMV BLD AUTO: 10.2 FL (ref 6–12)
RBC # BLD AUTO: 4.63 M/UL (ref 4.5–5.9)
WBC OTHER # BLD: 6.1 K/UL (ref 3.5–11)

## 2024-08-15 PROCEDURE — 85025 COMPLETE CBC W/AUTO DIFF WBC: CPT

## 2024-08-15 PROCEDURE — 36415 COLL VENOUS BLD VENIPUNCTURE: CPT

## 2024-08-22 ENCOUNTER — HOSPITAL ENCOUNTER (OUTPATIENT)
Age: 74
Discharge: HOME | End: 2024-08-22

## 2024-08-22 ENCOUNTER — HOSPITAL ENCOUNTER (OUTPATIENT)
Dept: CT IMAGING | Age: 74
Discharge: HOME | End: 2024-08-24
Attending: INTERNAL MEDICINE
Payer: MEDICARE

## 2024-08-22 DIAGNOSIS — D69.3 ACUTE IDIOPATHIC THROMBOCYTOPENIC PURPURA (HCC): ICD-10-CM

## 2024-08-22 DIAGNOSIS — R59.1 LYMPHADENOPATHY: ICD-10-CM

## 2024-08-22 DIAGNOSIS — D69.3 ACUTE IDIOPATHIC THROMBOCYTOPENIC PURPURA (HCC): Primary | ICD-10-CM

## 2024-08-22 DIAGNOSIS — R91.8 LUNG NODULES: ICD-10-CM

## 2024-08-22 LAB
BASOPHILS # BLD: 0.1 K/UL (ref 0–0.2)
BASOPHILS NFR BLD: 1 % (ref 0–2)
CREAT SERPL-MCNC: 1.5 MG/DL (ref 0.7–1.2)
EOSINOPHIL # BLD: 0.3 K/UL (ref 0–0.4)
EOSINOPHILS RELATIVE PERCENT: 5 % (ref 1–4)
ERYTHROCYTE [DISTWIDTH] IN BLOOD BY AUTOMATED COUNT: 14.7 % (ref 12.5–15.4)
GFR, ESTIMATED: 49 ML/MIN/1.73M2
HCT VFR BLD AUTO: 38.7 % (ref 41–53)
HGB BLD-MCNC: 12.9 G/DL (ref 13.5–17.5)
LYMPHOCYTES NFR BLD: 0.6 K/UL (ref 1–4.8)
LYMPHOCYTES RELATIVE PERCENT: 9 % (ref 24–44)
MCH RBC QN AUTO: 26.7 PG (ref 26–34)
MCHC RBC AUTO-ENTMCNC: 33.2 G/DL (ref 31–37)
MCV RBC AUTO: 80.4 FL (ref 80–100)
MONOCYTES NFR BLD: 0.9 K/UL (ref 0.1–1.2)
MONOCYTES NFR BLD: 15 % (ref 2–11)
NEUTROPHILS NFR BLD: 70 % (ref 36–66)
NEUTS SEG NFR BLD: 4.5 K/UL (ref 1.8–7.7)
PLATELET # BLD AUTO: 56 K/UL (ref 140–450)
PMV BLD AUTO: 11.4 FL (ref 6–12)
RBC # BLD AUTO: 4.82 M/UL (ref 4.5–5.9)
WBC OTHER # BLD: 6.4 K/UL (ref 3.5–11)

## 2024-08-22 PROCEDURE — 6360000004 HC RX CONTRAST MEDICATION: Performed by: INTERNAL MEDICINE

## 2024-08-22 PROCEDURE — 74177 CT ABD & PELVIS W/CONTRAST: CPT

## 2024-08-22 PROCEDURE — 82565 ASSAY OF CREATININE: CPT

## 2024-08-22 PROCEDURE — 85025 COMPLETE CBC W/AUTO DIFF WBC: CPT

## 2024-08-22 PROCEDURE — 2580000003 HC RX 258: Performed by: INTERNAL MEDICINE

## 2024-08-22 RX ORDER — SODIUM CHLORIDE 0.9 % (FLUSH) 0.9 %
10 SYRINGE (ML) INJECTION 2 TIMES DAILY
Status: DISCONTINUED | OUTPATIENT
Start: 2024-08-22 | End: 2024-08-25 | Stop reason: HOSPADM

## 2024-08-22 RX ORDER — 0.9 % SODIUM CHLORIDE 0.9 %
80 INTRAVENOUS SOLUTION INTRAVENOUS ONCE
Status: COMPLETED | OUTPATIENT
Start: 2024-08-22 | End: 2024-08-22

## 2024-08-22 RX ADMIN — IOPAMIDOL 100 ML: 755 INJECTION, SOLUTION INTRAVENOUS at 13:30

## 2024-08-22 RX ADMIN — SODIUM CHLORIDE 80 ML: 9 INJECTION, SOLUTION INTRAVENOUS at 13:29

## 2024-08-22 RX ADMIN — SODIUM CHLORIDE, PRESERVATIVE FREE 10 ML: 5 INJECTION INTRAVENOUS at 13:30

## 2024-08-29 ENCOUNTER — HOSPITAL ENCOUNTER (OUTPATIENT)
Age: 74
Discharge: HOME | End: 2024-08-29
Payer: MEDICARE

## 2024-08-29 DIAGNOSIS — D69.3 ACUTE IDIOPATHIC THROMBOCYTOPENIC PURPURA (HCC): ICD-10-CM

## 2024-08-29 LAB
BASOPHILS # BLD: 0 K/UL (ref 0–0.2)
BASOPHILS NFR BLD: 1 % (ref 0–2)
EOSINOPHIL # BLD: 0.3 K/UL (ref 0–0.4)
EOSINOPHILS RELATIVE PERCENT: 5 % (ref 1–4)
ERYTHROCYTE [DISTWIDTH] IN BLOOD BY AUTOMATED COUNT: 14.7 % (ref 12.5–15.4)
HCT VFR BLD AUTO: 36.2 % (ref 41–53)
HGB BLD-MCNC: 12.3 G/DL (ref 13.5–17.5)
LYMPHOCYTES NFR BLD: 0.5 K/UL (ref 1–4.8)
LYMPHOCYTES RELATIVE PERCENT: 10 % (ref 24–44)
MCH RBC QN AUTO: 27.1 PG (ref 26–34)
MCHC RBC AUTO-ENTMCNC: 33.9 G/DL (ref 31–37)
MCV RBC AUTO: 79.8 FL (ref 80–100)
MONOCYTES NFR BLD: 0.9 K/UL (ref 0.1–1.2)
MONOCYTES NFR BLD: 16 % (ref 2–11)
NEUTROPHILS NFR BLD: 68 % (ref 36–66)
NEUTS SEG NFR BLD: 3.6 K/UL (ref 1.8–7.7)
PLATELET # BLD AUTO: 38 K/UL (ref 140–450)
PMV BLD AUTO: 10.3 FL (ref 6–12)
RBC # BLD AUTO: 4.54 M/UL (ref 4.5–5.9)
WBC OTHER # BLD: 5.4 K/UL (ref 3.5–11)

## 2024-08-29 PROCEDURE — 85025 COMPLETE CBC W/AUTO DIFF WBC: CPT

## 2024-08-29 PROCEDURE — 36415 COLL VENOUS BLD VENIPUNCTURE: CPT

## 2024-09-05 ENCOUNTER — OFFICE VISIT (OUTPATIENT)
Dept: ONCOLOGY | Age: 74
End: 2024-09-05
Payer: MEDICARE

## 2024-09-05 ENCOUNTER — HOSPITAL ENCOUNTER (OUTPATIENT)
Age: 74
Discharge: HOME | End: 2024-09-05
Payer: MEDICARE

## 2024-09-05 VITALS
RESPIRATION RATE: 18 BRPM | HEART RATE: 63 BPM | DIASTOLIC BLOOD PRESSURE: 80 MMHG | SYSTOLIC BLOOD PRESSURE: 161 MMHG | OXYGEN SATURATION: 96 % | TEMPERATURE: 98.6 F | WEIGHT: 158.4 LBS | BODY MASS INDEX: 22.73 KG/M2

## 2024-09-05 DIAGNOSIS — D64.9 ANEMIA, NORMOCYTIC NORMOCHROMIC: ICD-10-CM

## 2024-09-05 DIAGNOSIS — D69.3 ACUTE IDIOPATHIC THROMBOCYTOPENIC PURPURA (HCC): ICD-10-CM

## 2024-09-05 DIAGNOSIS — D69.3 ACUTE IDIOPATHIC THROMBOCYTOPENIC PURPURA (HCC): Primary | ICD-10-CM

## 2024-09-05 LAB
BASOPHILS # BLD: 0 K/UL (ref 0–0.2)
BASOPHILS NFR BLD: 1 % (ref 0–2)
EOSINOPHIL # BLD: 0.3 K/UL (ref 0–0.4)
EOSINOPHILS RELATIVE PERCENT: 5 % (ref 1–4)
ERYTHROCYTE [DISTWIDTH] IN BLOOD BY AUTOMATED COUNT: 14.7 % (ref 12.5–15.4)
HCT VFR BLD AUTO: 36.9 % (ref 41–53)
HGB BLD-MCNC: 12.5 G/DL (ref 13.5–17.5)
LYMPHOCYTES NFR BLD: 0.6 K/UL (ref 1–4.8)
LYMPHOCYTES RELATIVE PERCENT: 11 % (ref 24–44)
MCH RBC QN AUTO: 27.1 PG (ref 26–34)
MCHC RBC AUTO-ENTMCNC: 33.8 G/DL (ref 31–37)
MCV RBC AUTO: 80 FL (ref 80–100)
MONOCYTES NFR BLD: 0.9 K/UL (ref 0.1–1.2)
MONOCYTES NFR BLD: 17 % (ref 2–11)
NEUTROPHILS NFR BLD: 66 % (ref 36–66)
NEUTS SEG NFR BLD: 3.8 K/UL (ref 1.8–7.7)
PLATELET # BLD AUTO: 34 K/UL (ref 140–450)
PMV BLD AUTO: 10.9 FL (ref 6–12)
RBC # BLD AUTO: 4.61 M/UL (ref 4.5–5.9)
WBC OTHER # BLD: 5.6 K/UL (ref 3.5–11)

## 2024-09-05 PROCEDURE — 3077F SYST BP >= 140 MM HG: CPT | Performed by: INTERNAL MEDICINE

## 2024-09-05 PROCEDURE — 1123F ACP DISCUSS/DSCN MKR DOCD: CPT | Performed by: INTERNAL MEDICINE

## 2024-09-05 PROCEDURE — 36415 COLL VENOUS BLD VENIPUNCTURE: CPT

## 2024-09-05 PROCEDURE — 3079F DIAST BP 80-89 MM HG: CPT | Performed by: INTERNAL MEDICINE

## 2024-09-05 PROCEDURE — 85025 COMPLETE CBC W/AUTO DIFF WBC: CPT

## 2024-09-05 PROCEDURE — 99214 OFFICE O/P EST MOD 30 MIN: CPT | Performed by: INTERNAL MEDICINE

## 2024-09-05 PROCEDURE — 99211 OFF/OP EST MAY X REQ PHY/QHP: CPT | Performed by: INTERNAL MEDICINE

## 2024-09-05 RX ORDER — ELTROMBOPAG OLAMINE 50 MG/1
50 TABLET, FILM COATED ORAL DAILY
Qty: 30 TABLET | Refills: 3 | Status: SHIPPED | OUTPATIENT
Start: 2024-09-05 | End: 2024-09-05

## 2024-09-05 RX ORDER — DILTIAZEM HYDROCHLORIDE 180 MG/1
CAPSULE, COATED, EXTENDED RELEASE ORAL
COMMUNITY
Start: 2024-09-04

## 2024-09-05 NOTE — PROGRESS NOTES
DIAGNOSIS:    ITP   Multiple adenopathy   CURRENT THERAPY:  Received Steroids with no response  IVIG with short lived response June/24  Rituximab, started July/2024  Unfortunately, short response to rituximab, plan to transition to Promacta September/2024  BRIEF CASE HISTORY:       The patient is a 73 y.o.   male who is admitted to the hospital for petechiae and was found to have severe thrombocytopenia which is new onset.  The patient describes fatigue, mucosal bleeding from the mouth.  No hemorrhagic diatheses otherwise.  No hematuria or blood in the stool.   The patient was here couple weeks ago with low platelets and was diagnosed with ITP.  We wanted to do a bone marrow aspiration and biopsy but could not be done at while in-house.  The patient was steroid refractory and responded briefly to IVIG.  Also CT scan showed multiple and lymphadenopathy concerning for lymphomatous masses, especially in mediastinum and retroperitoneum   Pt received Rituximab, bone marrow bx showed no primary BM disorder. 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     INTERIM HISTORY  Pt comes in today for follow up, received rituximab with good but transient improvement.  He has no symptoms, no bleeding or bruising  Fatigue is somewhat improving.  He is happy about the CT scan which showed no concerning features of malignancy.  The calcified lymph nodes are stable  He denies any bleeding.  No hematuria, no blood in the stool.  No bleeding when he brushes his teeth    Past Medical History:   has a past medical history of Hyperlipidemia, Hypertension, and Thrombocytopenia (HCC).    Past Surgical History:   has a past surgical history that includes Tonsillectomy and CT BIOPSY BONE MARROW (7/3/2024).     Medications:    Reviewed in Epic     Allergies:  Asa [aspirin], Fish-derived products, Penicillins, and Tetracyclines & related    Social History:   reports that he has never smoked. He has never used smokeless tobacco. He reports that he does not

## 2024-09-09 ENCOUNTER — TELEPHONE (OUTPATIENT)
Dept: INFUSION THERAPY | Age: 74
End: 2024-09-09

## 2024-09-19 ENCOUNTER — HOSPITAL ENCOUNTER (OUTPATIENT)
Age: 74
Discharge: HOME | End: 2024-09-19
Payer: MEDICARE

## 2024-09-19 DIAGNOSIS — D69.3 ACUTE IDIOPATHIC THROMBOCYTOPENIC PURPURA (HCC): ICD-10-CM

## 2024-09-19 DIAGNOSIS — D69.3 ACUTE IDIOPATHIC THROMBOCYTOPENIC PURPURA (HCC): Primary | ICD-10-CM

## 2024-09-19 LAB
BASOPHILS # BLD: 0.05 K/UL (ref 0–0.2)
BASOPHILS NFR BLD: 1 % (ref 0–2)
EOSINOPHIL # BLD: 0.25 K/UL (ref 0–0.4)
EOSINOPHILS RELATIVE PERCENT: 5 % (ref 1–4)
ERYTHROCYTE [DISTWIDTH] IN BLOOD BY AUTOMATED COUNT: 15 % (ref 12.5–15.4)
HCT VFR BLD AUTO: 37.2 % (ref 41–53)
HGB BLD-MCNC: 12.5 G/DL (ref 13.5–17.5)
LYMPHOCYTES NFR BLD: 0.6 K/UL (ref 1–4.8)
LYMPHOCYTES RELATIVE PERCENT: 12 % (ref 24–44)
MCH RBC QN AUTO: 27.1 PG (ref 26–34)
MCHC RBC AUTO-ENTMCNC: 33.6 G/DL (ref 31–37)
MCV RBC AUTO: 80.7 FL (ref 80–100)
MONOCYTES NFR BLD: 0.45 K/UL (ref 0.1–0.8)
MONOCYTES NFR BLD: 9 % (ref 1–7)
MORPHOLOGY: NORMAL
NEUTROPHILS NFR BLD: 73 % (ref 36–66)
NEUTS SEG NFR BLD: 3.65 K/UL (ref 1.8–7.7)
PLATELET # BLD AUTO: 173 K/UL (ref 140–450)
PMV BLD AUTO: 9.2 FL (ref 6–12)
RBC # BLD AUTO: 4.61 M/UL (ref 4.5–5.9)
WBC OTHER # BLD: 5 K/UL (ref 3.5–11)

## 2024-09-19 PROCEDURE — 85025 COMPLETE CBC W/AUTO DIFF WBC: CPT

## 2024-09-19 PROCEDURE — 36415 COLL VENOUS BLD VENIPUNCTURE: CPT

## 2024-10-03 ENCOUNTER — HOSPITAL ENCOUNTER (OUTPATIENT)
Age: 74
Discharge: HOME | End: 2024-10-03
Payer: MEDICARE

## 2024-10-03 DIAGNOSIS — D69.3 ACUTE IDIOPATHIC THROMBOCYTOPENIC PURPURA (HCC): ICD-10-CM

## 2024-10-03 LAB
BASOPHILS # BLD: 0 K/UL (ref 0–0.2)
BASOPHILS NFR BLD: 1 % (ref 0–2)
EOSINOPHIL # BLD: 0.3 K/UL (ref 0–0.4)
EOSINOPHILS RELATIVE PERCENT: 6 % (ref 1–4)
ERYTHROCYTE [DISTWIDTH] IN BLOOD BY AUTOMATED COUNT: 14.8 % (ref 12.5–15.4)
HCT VFR BLD AUTO: 37 % (ref 41–53)
HGB BLD-MCNC: 12.3 G/DL (ref 13.5–17.5)
LYMPHOCYTES NFR BLD: 0.5 K/UL (ref 1–4.8)
LYMPHOCYTES RELATIVE PERCENT: 10 % (ref 24–44)
MCH RBC QN AUTO: 26.7 PG (ref 26–34)
MCHC RBC AUTO-ENTMCNC: 33.2 G/DL (ref 31–37)
MCV RBC AUTO: 80.4 FL (ref 80–100)
MONOCYTES NFR BLD: 0.5 K/UL (ref 0.1–1.2)
MONOCYTES NFR BLD: 12 % (ref 2–11)
NEUTROPHILS NFR BLD: 71 % (ref 36–66)
NEUTS SEG NFR BLD: 3.3 K/UL (ref 1.8–7.7)
PLATELET # BLD AUTO: 315 K/UL (ref 140–450)
PMV BLD AUTO: 8.2 FL (ref 6–12)
RBC # BLD AUTO: 4.59 M/UL (ref 4.5–5.9)
WBC OTHER # BLD: 4.7 K/UL (ref 3.5–11)

## 2024-10-03 PROCEDURE — 36415 COLL VENOUS BLD VENIPUNCTURE: CPT

## 2024-10-03 PROCEDURE — 85025 COMPLETE CBC W/AUTO DIFF WBC: CPT

## 2024-10-17 ENCOUNTER — HOSPITAL ENCOUNTER (OUTPATIENT)
Age: 74
Discharge: HOME | End: 2024-10-17
Payer: MEDICARE

## 2024-10-17 DIAGNOSIS — D69.3 ACUTE IDIOPATHIC THROMBOCYTOPENIC PURPURA (HCC): ICD-10-CM

## 2024-10-17 LAB
BASOPHILS # BLD: 0.1 K/UL (ref 0–0.2)
BASOPHILS NFR BLD: 1 % (ref 0–2)
EOSINOPHIL # BLD: 0.3 K/UL (ref 0–0.4)
EOSINOPHILS RELATIVE PERCENT: 6 % (ref 1–4)
ERYTHROCYTE [DISTWIDTH] IN BLOOD BY AUTOMATED COUNT: 14.9 % (ref 12.5–15.4)
HCT VFR BLD AUTO: 39 % (ref 41–53)
HGB BLD-MCNC: 13.2 G/DL (ref 13.5–17.5)
LYMPHOCYTES NFR BLD: 0.5 K/UL (ref 1–4.8)
LYMPHOCYTES RELATIVE PERCENT: 9 % (ref 24–44)
MCH RBC QN AUTO: 27.3 PG (ref 26–34)
MCHC RBC AUTO-ENTMCNC: 34 G/DL (ref 31–37)
MCV RBC AUTO: 80.3 FL (ref 80–100)
MONOCYTES NFR BLD: 0.9 K/UL (ref 0.1–1.2)
MONOCYTES NFR BLD: 16 % (ref 2–11)
NEUTROPHILS NFR BLD: 68 % (ref 36–66)
NEUTS SEG NFR BLD: 3.6 K/UL (ref 1.8–7.7)
PLATELET # BLD AUTO: 295 K/UL (ref 140–450)
PMV BLD AUTO: 8.3 FL (ref 6–12)
RBC # BLD AUTO: 4.86 M/UL (ref 4.5–5.9)
WBC OTHER # BLD: 5.4 K/UL (ref 3.5–11)

## 2024-10-17 PROCEDURE — 85025 COMPLETE CBC W/AUTO DIFF WBC: CPT

## 2024-10-17 PROCEDURE — 36415 COLL VENOUS BLD VENIPUNCTURE: CPT

## 2024-10-31 ENCOUNTER — HOSPITAL ENCOUNTER (OUTPATIENT)
Age: 74
Discharge: HOME | End: 2024-10-31
Payer: MEDICARE

## 2024-10-31 DIAGNOSIS — D69.3 ACUTE IDIOPATHIC THROMBOCYTOPENIC PURPURA (HCC): ICD-10-CM

## 2024-10-31 LAB
BASOPHILS # BLD: 0.1 K/UL (ref 0–0.2)
BASOPHILS NFR BLD: 1 % (ref 0–2)
EOSINOPHIL # BLD: 0.3 K/UL (ref 0–0.4)
EOSINOPHILS RELATIVE PERCENT: 6 % (ref 1–4)
ERYTHROCYTE [DISTWIDTH] IN BLOOD BY AUTOMATED COUNT: 14.3 % (ref 12.5–15.4)
HCT VFR BLD AUTO: 38 % (ref 41–53)
HGB BLD-MCNC: 13.1 G/DL (ref 13.5–17.5)
LYMPHOCYTES NFR BLD: 0.5 K/UL (ref 1–4.8)
LYMPHOCYTES RELATIVE PERCENT: 12 % (ref 24–44)
MCH RBC QN AUTO: 27.6 PG (ref 26–34)
MCHC RBC AUTO-ENTMCNC: 34.4 G/DL (ref 31–37)
MCV RBC AUTO: 80.2 FL (ref 80–100)
MONOCYTES NFR BLD: 0.7 K/UL (ref 0.1–1.2)
MONOCYTES NFR BLD: 16 % (ref 2–11)
NEUTROPHILS NFR BLD: 65 % (ref 36–66)
NEUTS SEG NFR BLD: 2.8 K/UL (ref 1.8–7.7)
PLATELET # BLD AUTO: 275 K/UL (ref 140–450)
PMV BLD AUTO: 8.3 FL (ref 6–12)
RBC # BLD AUTO: 4.74 M/UL (ref 4.5–5.9)
WBC OTHER # BLD: 4.4 K/UL (ref 3.5–11)

## 2024-10-31 PROCEDURE — 36415 COLL VENOUS BLD VENIPUNCTURE: CPT

## 2024-10-31 PROCEDURE — 85025 COMPLETE CBC W/AUTO DIFF WBC: CPT

## 2024-11-14 ENCOUNTER — HOSPITAL ENCOUNTER (OUTPATIENT)
Age: 74
Discharge: HOME | End: 2024-11-14
Payer: MEDICARE

## 2024-11-14 ENCOUNTER — TELEPHONE (OUTPATIENT)
Dept: ONCOLOGY | Age: 74
End: 2024-11-14

## 2024-11-14 ENCOUNTER — HOSPITAL ENCOUNTER (EMERGENCY)
Age: 74
Discharge: HOME | End: 2024-11-14
Attending: EMERGENCY MEDICINE
Payer: MEDICARE

## 2024-11-14 ENCOUNTER — OFFICE VISIT (OUTPATIENT)
Dept: ONCOLOGY | Age: 74
End: 2024-11-14
Payer: MEDICARE

## 2024-11-14 ENCOUNTER — APPOINTMENT (OUTPATIENT)
Dept: GENERAL RADIOLOGY | Age: 74
End: 2024-11-14
Payer: MEDICARE

## 2024-11-14 VITALS
DIASTOLIC BLOOD PRESSURE: 71 MMHG | HEART RATE: 71 BPM | WEIGHT: 163 LBS | OXYGEN SATURATION: 93 % | SYSTOLIC BLOOD PRESSURE: 162 MMHG | RESPIRATION RATE: 16 BRPM | BODY MASS INDEX: 23.39 KG/M2 | TEMPERATURE: 98.6 F

## 2024-11-14 VITALS
DIASTOLIC BLOOD PRESSURE: 63 MMHG | WEIGHT: 163 LBS | TEMPERATURE: 96.8 F | BODY MASS INDEX: 23.39 KG/M2 | SYSTOLIC BLOOD PRESSURE: 176 MMHG | HEART RATE: 67 BPM | OXYGEN SATURATION: 93 % | RESPIRATION RATE: 18 BRPM

## 2024-11-14 DIAGNOSIS — R59.1 LYMPHADENOPATHY: ICD-10-CM

## 2024-11-14 DIAGNOSIS — R59.0 MEDIASTINAL ADENOPATHY: ICD-10-CM

## 2024-11-14 DIAGNOSIS — U07.1 COVID: Primary | ICD-10-CM

## 2024-11-14 DIAGNOSIS — D69.3 ACUTE IDIOPATHIC THROMBOCYTOPENIC PURPURA (HCC): Primary | ICD-10-CM

## 2024-11-14 DIAGNOSIS — D69.3 ACUTE IDIOPATHIC THROMBOCYTOPENIC PURPURA (HCC): ICD-10-CM

## 2024-11-14 LAB
ALBUMIN SERPL-MCNC: 4.4 G/DL (ref 3.5–5.2)
ALBUMIN/GLOB SERPL: 1.5 {RATIO} (ref 1–2.5)
ALP SERPL-CCNC: 65 U/L (ref 40–129)
ALT SERPL-CCNC: 14 U/L (ref 5–41)
ANION GAP SERPL CALCULATED.3IONS-SCNC: 14 MMOL/L (ref 9–17)
AST SERPL-CCNC: 22 U/L
BACTERIA URNS QL MICRO: ABNORMAL
BASOPHILS # BLD: 0 K/UL (ref 0–0.2)
BASOPHILS # BLD: 0.05 K/UL (ref 0–0.2)
BASOPHILS NFR BLD: 0 % (ref 0–2)
BASOPHILS NFR BLD: 1 % (ref 0–2)
BILIRUB SERPL-MCNC: 0.5 MG/DL (ref 0.3–1.2)
BILIRUB UR QL STRIP: NEGATIVE
BUN SERPL-MCNC: 31 MG/DL (ref 8–23)
CALCIUM SERPL-MCNC: 9.8 MG/DL (ref 8.6–10.4)
CHARACTER UR: ABNORMAL
CHLORIDE SERPL-SCNC: 105 MMOL/L (ref 98–107)
CLARITY UR: CLEAR
CO2 SERPL-SCNC: 22 MMOL/L (ref 20–31)
COLOR UR: YELLOW
CREAT SERPL-MCNC: 1.7 MG/DL (ref 0.7–1.2)
EOSINOPHIL # BLD: 0.14 K/UL (ref 0–0.4)
EOSINOPHIL # BLD: 0.14 K/UL (ref 0–0.4)
EOSINOPHILS RELATIVE PERCENT: 3 % (ref 1–4)
EOSINOPHILS RELATIVE PERCENT: 3 % (ref 1–4)
EPI CELLS #/AREA URNS HPF: ABNORMAL /HPF (ref 0–5)
ERYTHROCYTE [DISTWIDTH] IN BLOOD BY AUTOMATED COUNT: 14.2 % (ref 12.5–15.4)
ERYTHROCYTE [DISTWIDTH] IN BLOOD BY AUTOMATED COUNT: 14.7 % (ref 12.5–15.4)
FLUAV AG SPEC QL: NEGATIVE
FLUBV AG SPEC QL: NEGATIVE
GFR, ESTIMATED: 42 ML/MIN/1.73M2
GLUCOSE SERPL-MCNC: 99 MG/DL (ref 70–99)
GLUCOSE UR STRIP-MCNC: NEGATIVE MG/DL
HCT VFR BLD AUTO: 38.4 % (ref 41–53)
HCT VFR BLD AUTO: 40.6 % (ref 41–53)
HGB BLD-MCNC: 13.2 G/DL (ref 13.5–17.5)
HGB BLD-MCNC: 13.6 G/DL (ref 13.5–17.5)
HGB UR QL STRIP.AUTO: NEGATIVE
KETONES UR STRIP-MCNC: NEGATIVE MG/DL
LACTATE BLDV-SCNC: 1.5 MMOL/L (ref 0.5–1.9)
LEUKOCYTE ESTERASE UR QL STRIP: NEGATIVE
LYMPHOCYTES NFR BLD: 0.28 K/UL (ref 1–4.8)
LYMPHOCYTES NFR BLD: 0.29 K/UL (ref 1–4.8)
LYMPHOCYTES RELATIVE PERCENT: 6 % (ref 24–44)
LYMPHOCYTES RELATIVE PERCENT: 6 % (ref 24–44)
MCH RBC QN AUTO: 27.3 PG (ref 26–34)
MCH RBC QN AUTO: 27.7 PG (ref 26–34)
MCHC RBC AUTO-ENTMCNC: 33.5 G/DL (ref 31–37)
MCHC RBC AUTO-ENTMCNC: 34.5 G/DL (ref 31–37)
MCV RBC AUTO: 80.5 FL (ref 80–100)
MCV RBC AUTO: 81.5 FL (ref 80–100)
MONOCYTES NFR BLD: 0.8 K/UL (ref 0.1–0.8)
MONOCYTES NFR BLD: 0.86 K/UL (ref 0.1–1.2)
MONOCYTES NFR BLD: 17 % (ref 1–7)
MONOCYTES NFR BLD: 18 % (ref 2–11)
MORPHOLOGY: NORMAL
MORPHOLOGY: NORMAL
NEUTROPHILS NFR BLD: 72 % (ref 36–66)
NEUTROPHILS NFR BLD: 74 % (ref 36–66)
NEUTS SEG NFR BLD: 3.46 K/UL (ref 1.8–7.7)
NEUTS SEG NFR BLD: 3.48 K/UL (ref 1.8–7.7)
NITRITE UR QL STRIP: NEGATIVE
PH UR STRIP: 7 [PH] (ref 5–8)
PLATELET # BLD AUTO: 260 K/UL (ref 140–450)
PLATELET # BLD AUTO: 294 K/UL (ref 140–450)
PMV BLD AUTO: 8.5 FL (ref 6–12)
PMV BLD AUTO: 8.5 FL (ref 6–12)
POTASSIUM SERPL-SCNC: 3.9 MMOL/L (ref 3.7–5.3)
PROCALCITONIN SERPL-MCNC: 0.13 NG/ML (ref 0–0.09)
PROT SERPL-MCNC: 7.3 G/DL (ref 6.4–8.3)
PROT UR STRIP-MCNC: ABNORMAL MG/DL
RBC # BLD AUTO: 4.78 M/UL (ref 4.5–5.9)
RBC # BLD AUTO: 4.99 M/UL (ref 4.5–5.9)
RBC #/AREA URNS HPF: ABNORMAL /HPF (ref 0–2)
SARS-COV-2 RDRP RESP QL NAA+PROBE: DETECTED
SODIUM SERPL-SCNC: 141 MMOL/L (ref 135–144)
SP GR UR STRIP: 1.02 (ref 1–1.03)
SPECIMEN DESCRIPTION: ABNORMAL
TROPONIN I SERPL HS-MCNC: 19 NG/L (ref 0–22)
UROBILINOGEN UR STRIP-ACNC: NORMAL EU/DL (ref 0–1)
WBC #/AREA URNS HPF: ABNORMAL /HPF (ref 0–5)
WBC OTHER # BLD: 4.7 K/UL (ref 3.5–11)
WBC OTHER # BLD: 4.8 K/UL (ref 3.5–11)

## 2024-11-14 PROCEDURE — 81001 URINALYSIS AUTO W/SCOPE: CPT

## 2024-11-14 PROCEDURE — 99285 EMERGENCY DEPT VISIT HI MDM: CPT

## 2024-11-14 PROCEDURE — 93005 ELECTROCARDIOGRAM TRACING: CPT | Performed by: EMERGENCY MEDICINE

## 2024-11-14 PROCEDURE — 99211 OFF/OP EST MAY X REQ PHY/QHP: CPT | Performed by: INTERNAL MEDICINE

## 2024-11-14 PROCEDURE — 87040 BLOOD CULTURE FOR BACTERIA: CPT

## 2024-11-14 PROCEDURE — 84484 ASSAY OF TROPONIN QUANT: CPT

## 2024-11-14 PROCEDURE — 87635 SARS-COV-2 COVID-19 AMP PRB: CPT

## 2024-11-14 PROCEDURE — 71045 X-RAY EXAM CHEST 1 VIEW: CPT

## 2024-11-14 PROCEDURE — 80053 COMPREHEN METABOLIC PANEL: CPT

## 2024-11-14 PROCEDURE — 36415 COLL VENOUS BLD VENIPUNCTURE: CPT

## 2024-11-14 PROCEDURE — 87804 INFLUENZA ASSAY W/OPTIC: CPT

## 2024-11-14 PROCEDURE — 6370000000 HC RX 637 (ALT 250 FOR IP): Performed by: EMERGENCY MEDICINE

## 2024-11-14 PROCEDURE — 83605 ASSAY OF LACTIC ACID: CPT

## 2024-11-14 PROCEDURE — 84145 PROCALCITONIN (PCT): CPT

## 2024-11-14 PROCEDURE — 85025 COMPLETE CBC W/AUTO DIFF WBC: CPT

## 2024-11-14 RX ORDER — ACETAMINOPHEN 500 MG
1000 TABLET ORAL ONCE
Status: COMPLETED | OUTPATIENT
Start: 2024-11-14 | End: 2024-11-14

## 2024-11-14 RX ADMIN — ACETAMINOPHEN 1000 MG: 500 TABLET ORAL at 17:52

## 2024-11-14 NOTE — TELEPHONE ENCOUNTER
Instructions   from Dr. Bia Jaime MD    Rv in 3 months with cbc, cmp LDH   Change standing order for CBC to monthly     Rv scheduled on 3/13/2025 @10am; gave pt AVS and reminded them for labs

## 2024-11-14 NOTE — ED PROVIDER NOTES
Value Ref Range    Flu A Antigen NEGATIVE NEGATIVE    Flu B Antigen NEGATIVE NEGATIVE   CBC with Auto Differential   Result Value Ref Range    WBC 4.8 3.5 - 11.0 k/uL    RBC 4.99 4.5 - 5.9 m/uL    Hemoglobin 13.6 13.5 - 17.5 g/dL    Hematocrit 40.6 (L) 41 - 53 %    MCV 81.5 80 - 100 fL    MCH 27.3 26 - 34 pg    MCHC 33.5 31 - 37 g/dL    RDW 14.2 12.5 - 15.4 %    Platelets 260 140 - 450 k/uL    MPV 8.5 6.0 - 12.0 fL    Neutrophils % 72 (H) 36 - 66 %    Lymphocytes % 6 (L) 24 - 44 %    Monocytes % 18 (H) 2 - 11 %    Eosinophils % 3 1 - 4 %    Basophils % 1 0 - 2 %    Neutrophils Absolute 3.46 1.8 - 7.7 k/uL    Lymphocytes Absolute 0.29 (L) 1.0 - 4.8 k/uL    Monocytes Absolute 0.86 0.1 - 1.2 k/uL    Eosinophils Absolute 0.14 0.0 - 0.4 k/uL    Basophils Absolute 0.05 0.0 - 0.2 k/uL    Morphology Normal    Comprehensive Metabolic Panel   Result Value Ref Range    Sodium 141 135 - 144 mmol/L    Potassium 3.9 3.7 - 5.3 mmol/L    Chloride 105 98 - 107 mmol/L    CO2 22 20 - 31 mmol/L    Anion Gap 14 9 - 17 mmol/L    Glucose 99 70 - 99 mg/dL    BUN 31 (H) 8 - 23 mg/dL    Creatinine 1.7 (H) 0.7 - 1.2 mg/dL    Est, Glom Filt Rate 42 (L) >60 mL/min/1.73m2    Calcium 9.8 8.6 - 10.4 mg/dL    Total Protein 7.3 6.4 - 8.3 g/dL    Albumin 4.4 3.5 - 5.2 g/dL    Albumin/Globulin Ratio 1.5 1.0 - 2.5    Total Bilirubin 0.5 0.3 - 1.2 mg/dL    Alkaline Phosphatase 65 40 - 129 U/L    ALT 14 5 - 41 U/L    AST 22 <40 U/L   Lactate, Sepsis   Result Value Ref Range    Lactic Acid, Sepsis 1.5 0.5 - 1.9 mmol/L   Urinalysis with Reflex to Culture    Specimen: Urine   Result Value Ref Range    Color, UA Yellow Yellow    Turbidity UA Clear Clear    Glucose, Ur NEGATIVE NEGATIVE mg/dL    Bilirubin, Urine NEGATIVE NEGATIVE    Ketones, Urine NEGATIVE NEGATIVE mg/dL    Specific Gravity, UA 1.020 1.005 - 1.030    Urine Hgb NEGATIVE NEGATIVE    pH, Urine 7.0 5.0 - 8.0    Protein, UA 2+ (A) NEGATIVE mg/dL    Urobilinogen, Urine Normal 0.0 - 1.0 EU/dL     Nitrite, Urine NEGATIVE NEGATIVE    Leukocyte Esterase, Urine NEGATIVE NEGATIVE   Troponin   Result Value Ref Range    Troponin, High Sensitivity 19 0 - 22 ng/L   Microscopic Urinalysis   Result Value Ref Range    WBC, UA None 0 - 5 /HPF    RBC, UA None 0 - 2 /HPF    Epithelial Cells, UA None 0 - 5 /HPF    Bacteria, UA None None    Other Observations UA (A) NOT REQ.     Utilizing a urinalysis as the only screening method to exclude a potential uropathogen can be unreliable in many patient populations.  Rapid screening tests are less sensitive than culture and if UTI is a clinical possibility, culture should be considered despite a negative urinalysis.     EKG 12 Lead   Result Value Ref Range    Ventricular Rate 81 BPM    Atrial Rate 81 BPM    P-R Interval 256 ms    QRS Duration 126 ms    Q-T Interval 414 ms    QTc Calculation (Bazett) 480 ms    P Axis 60 degrees    R Axis -43 degrees    T Axis 9 degrees       EMERGENCY DEPARTMENT COURSE:     Thepatient was given the following medications:  Orders Placed This Encounter   Medications    acetaminophen (TYLENOL) tablet 1,000 mg        Vitals:    Vitals:    11/14/24 1655 11/14/24 1712   BP: (!) 187/89 (!) 173/78   Pulse: 83    Resp: 16    Temp: (!) 102.7 °F (39.3 °C)    TempSrc: Oral    SpO2: 94% 94%   Weight: 73.9 kg (163 lb)      -------------------------  BP: (!) 173/78, Temp: (!) 102.7 °F (39.3 °C), Pulse: 83, Respirations: 16      Re-evaluation Notes  6:54 PM EST  I have updated the patient.  He feels better after antipyretics.  I have the nursing staff ambulate the patient and see how he does.    7:34 PM EST  Is able to ambulate.  In fact he ambulates very well.  He feels great.  The fever is gone.  I feel that the weakness was fever related.  Supportive care and antipyretic instruction has been given.    CONSULTS:  None    CRITICAL CARE:   None    PROCEDURES:  None    FINAL IMPRESSION      1. COVID          DISPOSITION/PLAN   DISPOSITION Decision To Discharge

## 2024-11-14 NOTE — PROGRESS NOTES
nondistended, no masses or organomegaly   Neurological - alert, oriented, normal speech, no focal findings or movement disorder noted   Musculoskeletal - no joint tenderness, deformity or swelling   Extremities - peripheral pulses normal, no pedal edema, no clubbing or cyanosis   Skin -petechiae and bruising have subsided    DATA:    Labs:   CBC:   Lab Results   Component Value Date    WBC 4.7 11/14/2024    HGB 13.2 (L) 11/14/2024    HCT 38.4 (L) 11/14/2024    MCV 80.5 11/14/2024     11/14/2024         BMP:     Chemistry        Component Value Date/Time     07/08/2024 1115    K 3.8 07/08/2024 1115     07/08/2024 1115    CO2 25 07/08/2024 1115    BUN 24 (H) 07/08/2024 1115    CREATININE 1.5 (H) 08/22/2024 1241        Component Value Date/Time    CALCIUM 8.5 (L) 07/08/2024 1115    ALKPHOS 46 07/08/2024 1115    AST 19 07/08/2024 1115    ALT 16 07/08/2024 1115    BILITOT 0.3 07/08/2024 1115                IMAGING DATA:   IMPRESSION:  Stable lung nodules.  No new nodules are seen.     Persistent significant calcified hilar and mediastinal lymphadenopathy  unchanged.     Minimal pericardial effusion unchanged.     No evidence of metastatic disease to the abdomen or pelvis.     Cholelithiasis as before.               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  The picture is concerning for underlying lymphoproliferative disorders possibly lymphoma  Steroid refractory and short response  to IVIG   Good response to RItuxan but it was short-lived  Transition to avatrombopag with excellent response    RECOMMENDATIONS:  Patient had excellent response to avatrombopag  I reviewed the bone marrow results with the patient, no primary disorder seen. 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

## 2024-11-17 LAB
EKG ATRIAL RATE: 81 BPM
EKG P AXIS: 60 DEGREES
EKG P-R INTERVAL: 256 MS
EKG Q-T INTERVAL: 414 MS
EKG QRS DURATION: 126 MS
EKG QTC CALCULATION (BAZETT): 480 MS
EKG R AXIS: -43 DEGREES
EKG T AXIS: 9 DEGREES
EKG VENTRICULAR RATE: 81 BPM
MICROORGANISM SPEC CULT: NORMAL
MICROORGANISM SPEC CULT: NORMAL
SERVICE CMNT-IMP: NORMAL
SERVICE CMNT-IMP: NORMAL
SPECIMEN DESCRIPTION: NORMAL
SPECIMEN DESCRIPTION: NORMAL

## 2024-11-17 PROCEDURE — 93010 ELECTROCARDIOGRAM REPORT: CPT | Performed by: INTERNAL MEDICINE

## 2024-11-19 LAB
MICROORGANISM SPEC CULT: NORMAL
MICROORGANISM SPEC CULT: NORMAL
SERVICE CMNT-IMP: NORMAL
SERVICE CMNT-IMP: NORMAL
SPECIMEN DESCRIPTION: NORMAL
SPECIMEN DESCRIPTION: NORMAL

## 2024-12-13 ENCOUNTER — HOSPITAL ENCOUNTER (OUTPATIENT)
Age: 74
Discharge: HOME | End: 2024-12-13
Payer: MEDICARE

## 2024-12-13 DIAGNOSIS — D69.3 ACUTE IDIOPATHIC THROMBOCYTOPENIC PURPURA (HCC): ICD-10-CM

## 2024-12-13 LAB
BASOPHILS # BLD: 0 K/UL (ref 0–0.2)
BASOPHILS NFR BLD: 1 % (ref 0–2)
EOSINOPHIL # BLD: 0.4 K/UL (ref 0–0.4)
EOSINOPHILS RELATIVE PERCENT: 8 % (ref 1–4)
ERYTHROCYTE [DISTWIDTH] IN BLOOD BY AUTOMATED COUNT: 14.8 % (ref 12.5–15.4)
HCT VFR BLD AUTO: 38.3 % (ref 41–53)
HGB BLD-MCNC: 12.9 G/DL (ref 13.5–17.5)
LYMPHOCYTES NFR BLD: 0.5 K/UL (ref 1–4.8)
LYMPHOCYTES RELATIVE PERCENT: 11 % (ref 24–44)
MCH RBC QN AUTO: 28 PG (ref 26–34)
MCHC RBC AUTO-ENTMCNC: 33.8 G/DL (ref 31–37)
MCV RBC AUTO: 82.7 FL (ref 80–100)
MONOCYTES NFR BLD: 0.5 K/UL (ref 0.1–1.2)
MONOCYTES NFR BLD: 11 % (ref 2–11)
NEUTROPHILS NFR BLD: 69 % (ref 36–66)
NEUTS SEG NFR BLD: 3.3 K/UL (ref 1.8–7.7)
PLATELET # BLD AUTO: 327 K/UL (ref 140–450)
PMV BLD AUTO: 9 FL (ref 6–12)
RBC # BLD AUTO: 4.63 M/UL (ref 4.5–5.9)
WBC OTHER # BLD: 4.7 K/UL (ref 3.5–11)

## 2024-12-13 PROCEDURE — 36415 COLL VENOUS BLD VENIPUNCTURE: CPT

## 2024-12-13 PROCEDURE — 85025 COMPLETE CBC W/AUTO DIFF WBC: CPT

## 2025-01-13 ENCOUNTER — HOSPITAL ENCOUNTER (OUTPATIENT)
Age: 75
Discharge: HOME | End: 2025-01-13
Payer: MEDICARE

## 2025-01-13 DIAGNOSIS — D69.3 ACUTE IDIOPATHIC THROMBOCYTOPENIC PURPURA (HCC): ICD-10-CM

## 2025-01-13 LAB
ALBUMIN SERPL-MCNC: 4.3 G/DL (ref 3.5–5.2)
ALBUMIN/GLOB SERPL: 1.7 {RATIO} (ref 1–2.5)
ALP SERPL-CCNC: 57 U/L (ref 40–129)
ALT SERPL-CCNC: 12 U/L (ref 5–41)
ANION GAP SERPL CALCULATED.3IONS-SCNC: 13 MMOL/L (ref 9–17)
AST SERPL-CCNC: 18 U/L
BASOPHILS # BLD: 0 K/UL (ref 0–0.2)
BASOPHILS NFR BLD: 1 % (ref 0–2)
BILIRUB SERPL-MCNC: 0.4 MG/DL (ref 0.3–1.2)
BUN SERPL-MCNC: 27 MG/DL (ref 8–23)
CALCIUM SERPL-MCNC: 9.4 MG/DL (ref 8.6–10.4)
CHLORIDE SERPL-SCNC: 109 MMOL/L (ref 98–107)
CO2 SERPL-SCNC: 21 MMOL/L (ref 20–31)
CREAT SERPL-MCNC: 1.7 MG/DL (ref 0.7–1.2)
EOSINOPHIL # BLD: 0.3 K/UL (ref 0–0.4)
EOSINOPHILS RELATIVE PERCENT: 6 % (ref 1–4)
ERYTHROCYTE [DISTWIDTH] IN BLOOD BY AUTOMATED COUNT: 15.3 % (ref 12.5–15.4)
GFR, ESTIMATED: 42 ML/MIN/1.73M2
GLUCOSE SERPL-MCNC: 157 MG/DL (ref 70–99)
HCT VFR BLD AUTO: 39.7 % (ref 41–53)
HGB BLD-MCNC: 13.2 G/DL (ref 13.5–17.5)
LDH SERPL-CCNC: 271 U/L (ref 135–225)
LYMPHOCYTES NFR BLD: 0.5 K/UL (ref 1–4.8)
LYMPHOCYTES RELATIVE PERCENT: 10 % (ref 24–44)
MCH RBC QN AUTO: 27.7 PG (ref 26–34)
MCHC RBC AUTO-ENTMCNC: 33.3 G/DL (ref 31–37)
MCV RBC AUTO: 83.3 FL (ref 80–100)
MONOCYTES NFR BLD: 0.6 K/UL (ref 0.1–1.2)
MONOCYTES NFR BLD: 11 % (ref 2–11)
NEUTROPHILS NFR BLD: 72 % (ref 36–66)
NEUTS SEG NFR BLD: 3.6 K/UL (ref 1.8–7.7)
PLATELET # BLD AUTO: 285 K/UL (ref 140–450)
PMV BLD AUTO: 9.5 FL (ref 6–12)
POTASSIUM SERPL-SCNC: 3.8 MMOL/L (ref 3.7–5.3)
PROT SERPL-MCNC: 6.9 G/DL (ref 6.4–8.3)
RBC # BLD AUTO: 4.77 M/UL (ref 4.5–5.9)
SODIUM SERPL-SCNC: 143 MMOL/L (ref 135–144)
WBC OTHER # BLD: 5 K/UL (ref 3.5–11)

## 2025-01-13 PROCEDURE — 83615 LACTATE (LD) (LDH) ENZYME: CPT

## 2025-01-13 PROCEDURE — 80053 COMPREHEN METABOLIC PANEL: CPT

## 2025-01-13 PROCEDURE — 36415 COLL VENOUS BLD VENIPUNCTURE: CPT

## 2025-01-13 PROCEDURE — 85025 COMPLETE CBC W/AUTO DIFF WBC: CPT

## 2025-02-12 ENCOUNTER — HOSPITAL ENCOUNTER (OUTPATIENT)
Age: 75
Discharge: HOME | End: 2025-02-12
Payer: MEDICARE

## 2025-02-12 DIAGNOSIS — D69.3 ACUTE IDIOPATHIC THROMBOCYTOPENIC PURPURA (HCC): ICD-10-CM

## 2025-02-12 LAB
ALBUMIN SERPL-MCNC: 4.2 G/DL (ref 3.5–5.2)
ALBUMIN/GLOB SERPL: 1.4 {RATIO} (ref 1–2.5)
ALP SERPL-CCNC: 52 U/L (ref 40–129)
ALT SERPL-CCNC: 14 U/L (ref 5–41)
ANION GAP SERPL CALCULATED.3IONS-SCNC: 11 MMOL/L (ref 9–17)
AST SERPL-CCNC: 20 U/L
BASOPHILS # BLD: 0 K/UL (ref 0–0.2)
BASOPHILS NFR BLD: 1 % (ref 0–2)
BILIRUB SERPL-MCNC: 0.4 MG/DL (ref 0.3–1.2)
BUN SERPL-MCNC: 26 MG/DL (ref 8–23)
CALCIUM SERPL-MCNC: 9.6 MG/DL (ref 8.6–10.4)
CHLORIDE SERPL-SCNC: 109 MMOL/L (ref 98–107)
CO2 SERPL-SCNC: 22 MMOL/L (ref 20–31)
CREAT SERPL-MCNC: 1.7 MG/DL (ref 0.7–1.2)
EOSINOPHIL # BLD: 0.2 K/UL (ref 0–0.4)
EOSINOPHILS RELATIVE PERCENT: 5 % (ref 1–4)
ERYTHROCYTE [DISTWIDTH] IN BLOOD BY AUTOMATED COUNT: 14.3 % (ref 12.5–15.4)
GFR, ESTIMATED: 42 ML/MIN/1.73M2
GLUCOSE SERPL-MCNC: 167 MG/DL (ref 70–99)
HCT VFR BLD AUTO: 40.6 % (ref 41–53)
HGB BLD-MCNC: 13.7 G/DL (ref 13.5–17.5)
LYMPHOCYTES NFR BLD: 0.5 K/UL (ref 1–4.8)
LYMPHOCYTES RELATIVE PERCENT: 11 % (ref 24–44)
MCH RBC QN AUTO: 28.1 PG (ref 26–34)
MCHC RBC AUTO-ENTMCNC: 33.6 G/DL (ref 31–37)
MCV RBC AUTO: 83.6 FL (ref 80–100)
MONOCYTES NFR BLD: 0.5 K/UL (ref 0.1–1.2)
MONOCYTES NFR BLD: 10 % (ref 2–11)
NEUTROPHILS NFR BLD: 73 % (ref 36–66)
NEUTS SEG NFR BLD: 3.3 K/UL (ref 1.8–7.7)
PLATELET # BLD AUTO: 72 K/UL (ref 140–450)
PMV BLD AUTO: 11.4 FL (ref 6–12)
POTASSIUM SERPL-SCNC: 4 MMOL/L (ref 3.7–5.3)
PROT SERPL-MCNC: 7.3 G/DL (ref 6.4–8.3)
RBC # BLD AUTO: 4.86 M/UL (ref 4.5–5.9)
SODIUM SERPL-SCNC: 142 MMOL/L (ref 135–144)
WBC OTHER # BLD: 4.6 K/UL (ref 3.5–11)

## 2025-02-12 PROCEDURE — 85025 COMPLETE CBC W/AUTO DIFF WBC: CPT

## 2025-02-12 PROCEDURE — 36415 COLL VENOUS BLD VENIPUNCTURE: CPT

## 2025-02-12 PROCEDURE — 80053 COMPREHEN METABOLIC PANEL: CPT

## 2025-02-13 ENCOUNTER — TELEPHONE (OUTPATIENT)
Dept: INFUSION THERAPY | Age: 75
End: 2025-02-13

## 2025-02-13 NOTE — TELEPHONE ENCOUNTER
Pt called stating he would like Dr. Coon made aware of his plt count of 72. Dr. Coon notified; he states to have pt continue on current medication and repeat cbc next week. Pt notified and verbalized understanding.

## 2025-02-19 ENCOUNTER — HOSPITAL ENCOUNTER (OUTPATIENT)
Age: 75
Discharge: HOME | End: 2025-02-19
Payer: MEDICARE

## 2025-02-19 ENCOUNTER — TELEPHONE (OUTPATIENT)
Dept: INFUSION THERAPY | Age: 75
End: 2025-02-19

## 2025-02-19 DIAGNOSIS — D69.3 ACUTE IDIOPATHIC THROMBOCYTOPENIC PURPURA (HCC): ICD-10-CM

## 2025-02-19 LAB
ALBUMIN SERPL-MCNC: 4.3 G/DL (ref 3.5–5.2)
ALBUMIN/GLOB SERPL: 1.4 {RATIO} (ref 1–2.5)
ALP SERPL-CCNC: 54 U/L (ref 40–129)
ALT SERPL-CCNC: 14 U/L (ref 5–41)
ANION GAP SERPL CALCULATED.3IONS-SCNC: 11 MMOL/L (ref 9–17)
AST SERPL-CCNC: 20 U/L
BASOPHILS # BLD: 0.04 K/UL (ref 0–0.2)
BASOPHILS NFR BLD: 1 % (ref 0–2)
BILIRUB SERPL-MCNC: 0.5 MG/DL (ref 0.3–1.2)
BUN SERPL-MCNC: 30 MG/DL (ref 8–23)
CALCIUM SERPL-MCNC: 9.6 MG/DL (ref 8.6–10.4)
CHLORIDE SERPL-SCNC: 109 MMOL/L (ref 98–107)
CO2 SERPL-SCNC: 23 MMOL/L (ref 20–31)
CREAT SERPL-MCNC: 1.7 MG/DL (ref 0.7–1.2)
EOSINOPHIL # BLD: 0.17 K/UL (ref 0–0.4)
EOSINOPHILS RELATIVE PERCENT: 4 % (ref 1–4)
ERYTHROCYTE [DISTWIDTH] IN BLOOD BY AUTOMATED COUNT: 13.8 % (ref 12.5–15.4)
GFR, ESTIMATED: 42 ML/MIN/1.73M2
GLUCOSE SERPL-MCNC: 148 MG/DL (ref 70–99)
HCT VFR BLD AUTO: 40.2 % (ref 41–53)
HGB BLD-MCNC: 13.6 G/DL (ref 13.5–17.5)
LYMPHOCYTES NFR BLD: 0.47 K/UL (ref 1–4.8)
LYMPHOCYTES RELATIVE PERCENT: 11 % (ref 24–44)
MCH RBC QN AUTO: 28.1 PG (ref 26–34)
MCHC RBC AUTO-ENTMCNC: 33.8 G/DL (ref 31–37)
MCV RBC AUTO: 83.3 FL (ref 80–100)
METAMYELOCYTES ABSOLUTE COUNT: 0.04 K/UL
METAMYELOCYTES: 1 %
MONOCYTES NFR BLD: 0.34 K/UL (ref 0.1–0.8)
MONOCYTES NFR BLD: 8 % (ref 1–7)
MORPHOLOGY: NORMAL
NEUTROPHILS NFR BLD: 75 % (ref 36–66)
NEUTS SEG NFR BLD: 3.24 K/UL (ref 1.8–7.7)
PLATELET # BLD AUTO: 34 K/UL (ref 140–450)
PMV BLD AUTO: 12.4 FL (ref 6–12)
POTASSIUM SERPL-SCNC: 3.9 MMOL/L (ref 3.7–5.3)
PROT SERPL-MCNC: 7.3 G/DL (ref 6.4–8.3)
RBC # BLD AUTO: 4.83 M/UL (ref 4.5–5.9)
SODIUM SERPL-SCNC: 143 MMOL/L (ref 135–144)
WBC OTHER # BLD: 4.3 K/UL (ref 3.5–11)

## 2025-02-19 PROCEDURE — 36415 COLL VENOUS BLD VENIPUNCTURE: CPT

## 2025-02-19 PROCEDURE — 80053 COMPREHEN METABOLIC PANEL: CPT

## 2025-02-19 PROCEDURE — 85025 COMPLETE CBC W/AUTO DIFF WBC: CPT

## 2025-02-19 NOTE — TELEPHONE ENCOUNTER
Patient called stating that his platelets have dropped from 200 to 72 to 34.  Asking what to do.  Please advise

## 2025-02-20 NOTE — TELEPHONE ENCOUNTER
Per Dr. Coon, pt needs follow up tomorrow to discuss medication. Writer called pt and pt is agreeable. Pt scheduled for tomorrow at 0945.

## 2025-02-21 ENCOUNTER — OFFICE VISIT (OUTPATIENT)
Dept: ONCOLOGY | Age: 75
End: 2025-02-21
Payer: MEDICARE

## 2025-02-21 ENCOUNTER — TELEPHONE (OUTPATIENT)
Dept: ONCOLOGY | Age: 75
End: 2025-02-21

## 2025-02-21 VITALS
HEART RATE: 61 BPM | OXYGEN SATURATION: 96 % | SYSTOLIC BLOOD PRESSURE: 151 MMHG | RESPIRATION RATE: 18 BRPM | TEMPERATURE: 96.4 F | DIASTOLIC BLOOD PRESSURE: 82 MMHG | WEIGHT: 159 LBS | BODY MASS INDEX: 22.81 KG/M2

## 2025-02-21 DIAGNOSIS — D69.3 IDIOPATHIC THROMBOCYTOPENIC PURPURA (HCC): Primary | ICD-10-CM

## 2025-02-21 PROCEDURE — 99211 OFF/OP EST MAY X REQ PHY/QHP: CPT | Performed by: INTERNAL MEDICINE

## 2025-02-21 NOTE — PROGRESS NOTES
DIAGNOSIS:    ITP   Multiple adenopathy   CURRENT THERAPY:  Received Steroids with no response  IVIG with short lived response June/24  Rituximab, started July/2024  Unfortunately, short response to rituximab, plan to transition to avatrombopag September/2024 2/2025, relapsed disease with worsening platelet count.  Managed with adjusted dose of avatrombopag  BRIEF CASE HISTORY:       The patient is a 74 y.o.   male who is admitted to the hospital for petechiae and was found to have severe thrombocytopenia which is new onset.  The patient describes fatigue, mucosal bleeding from the mouth.  No hemorrhagic diatheses otherwise.  No hematuria or blood in the stool.   The patient was here couple weeks ago with low platelets and was diagnosed with ITP.  We wanted to do a bone marrow aspiration and biopsy but could not be done at while in-house.  The patient was steroid refractory and responded briefly to IVIG.  Also CT scan showed multiple and lymphadenopathy concerning for lymphomatous masses, especially in mediastinum and retroperitoneum   Pt received Rituximab, bone marrow bx showed no primary BM disorder. 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   He received 4 doses of rituximab which were well-tolerated he had a good but brief response and relapsed quickly.  We decided to treat him with avatrombopag with excellent response  INTERIM HISTORY  This is unscheduled visit.  The patient has been tolerating avatrombopag extremely well.  However, there is a progressive decline in his platelets on his routine CBC.  Platelets are down to 25,000.  Patient comes in today to the clinic.  He denies any bruising or bleeding.  He has worsening fatigue but otherwise no symptoms.  He has been compliant with the medication and he did not start any new medication.  The patient was taking some B complex vitamins and stopped using them a few weeks ago and now he thinks that this might have affected his platelets so he will go back to 2 taking

## 2025-02-21 NOTE — TELEPHONE ENCOUNTER
Instructions   from Dr. Bia Jaime MD    Need weekly cbc  Rv in 3 weeks.        Rv was scheduled prior to pt stopping at checkout; gave pt a copy of their AVS

## 2025-02-26 ENCOUNTER — HOSPITAL ENCOUNTER (OUTPATIENT)
Age: 75
Discharge: HOME | End: 2025-02-26
Payer: MEDICARE

## 2025-02-26 DIAGNOSIS — D69.3 ACUTE IDIOPATHIC THROMBOCYTOPENIC PURPURA (HCC): ICD-10-CM

## 2025-02-26 LAB
ALBUMIN SERPL-MCNC: 4.2 G/DL (ref 3.5–5.2)
ALBUMIN/GLOB SERPL: 1.4 {RATIO} (ref 1–2.5)
ALP SERPL-CCNC: 54 U/L (ref 40–129)
ALT SERPL-CCNC: 15 U/L (ref 5–41)
ANION GAP SERPL CALCULATED.3IONS-SCNC: 11 MMOL/L (ref 9–17)
AST SERPL-CCNC: 21 U/L
BASOPHILS # BLD: 0.05 K/UL (ref 0–0.2)
BASOPHILS NFR BLD: 1 % (ref 0–2)
BILIRUB SERPL-MCNC: 0.4 MG/DL (ref 0.3–1.2)
BUN SERPL-MCNC: 31 MG/DL (ref 8–23)
CALCIUM SERPL-MCNC: 9.7 MG/DL (ref 8.6–10.4)
CHLORIDE SERPL-SCNC: 109 MMOL/L (ref 98–107)
CO2 SERPL-SCNC: 24 MMOL/L (ref 20–31)
CREAT SERPL-MCNC: 1.7 MG/DL (ref 0.7–1.2)
EOSINOPHIL # BLD: 0.25 K/UL (ref 0–0.4)
EOSINOPHILS RELATIVE PERCENT: 5 % (ref 1–4)
ERYTHROCYTE [DISTWIDTH] IN BLOOD BY AUTOMATED COUNT: 13.7 % (ref 12.5–15.4)
GFR, ESTIMATED: 42 ML/MIN/1.73M2
GLUCOSE SERPL-MCNC: 137 MG/DL (ref 70–99)
HCT VFR BLD AUTO: 40.5 % (ref 41–53)
HGB BLD-MCNC: 13.8 G/DL (ref 13.5–17.5)
LYMPHOCYTES NFR BLD: 0.49 K/UL (ref 1–4.8)
LYMPHOCYTES RELATIVE PERCENT: 10 % (ref 24–44)
MCH RBC QN AUTO: 28.5 PG (ref 26–34)
MCHC RBC AUTO-ENTMCNC: 34 G/DL (ref 31–37)
MCV RBC AUTO: 83.7 FL (ref 80–100)
MONOCYTES NFR BLD: 0.44 K/UL (ref 0.1–1.2)
MONOCYTES NFR BLD: 9 % (ref 2–11)
MORPHOLOGY: ABNORMAL
NEUTROPHILS NFR BLD: 75 % (ref 36–66)
NEUTS SEG NFR BLD: 3.67 K/UL (ref 1.8–7.7)
PLATELET # BLD AUTO: 39 K/UL (ref 140–450)
PMV BLD AUTO: 12 FL (ref 6–12)
POTASSIUM SERPL-SCNC: 4 MMOL/L (ref 3.7–5.3)
PROT SERPL-MCNC: 7.2 G/DL (ref 6.4–8.3)
RBC # BLD AUTO: 4.84 M/UL (ref 4.5–5.9)
SODIUM SERPL-SCNC: 144 MMOL/L (ref 135–144)
WBC OTHER # BLD: 4.9 K/UL (ref 3.5–11)

## 2025-02-26 PROCEDURE — 85025 COMPLETE CBC W/AUTO DIFF WBC: CPT

## 2025-02-26 PROCEDURE — 36415 COLL VENOUS BLD VENIPUNCTURE: CPT

## 2025-02-26 PROCEDURE — 80053 COMPREHEN METABOLIC PANEL: CPT

## 2025-03-05 ENCOUNTER — HOSPITAL ENCOUNTER (OUTPATIENT)
Age: 75
Discharge: HOME | End: 2025-03-05
Payer: MEDICARE

## 2025-03-05 DIAGNOSIS — D69.3 ACUTE IDIOPATHIC THROMBOCYTOPENIC PURPURA (HCC): ICD-10-CM

## 2025-03-05 LAB
ALBUMIN SERPL-MCNC: 4.1 G/DL (ref 3.5–5.2)
ALBUMIN/GLOB SERPL: 1.4 {RATIO} (ref 1–2.5)
ALP SERPL-CCNC: 53 U/L (ref 40–129)
ALT SERPL-CCNC: 12 U/L (ref 5–41)
ANION GAP SERPL CALCULATED.3IONS-SCNC: 11 MMOL/L (ref 9–17)
AST SERPL-CCNC: 20 U/L
BASOPHILS # BLD: 0 K/UL (ref 0–0.2)
BASOPHILS NFR BLD: 0 % (ref 0–2)
BILIRUB SERPL-MCNC: 0.4 MG/DL (ref 0.3–1.2)
BUN SERPL-MCNC: 29 MG/DL (ref 8–23)
CALCIUM SERPL-MCNC: 9.4 MG/DL (ref 8.6–10.4)
CHLORIDE SERPL-SCNC: 110 MMOL/L (ref 98–107)
CO2 SERPL-SCNC: 23 MMOL/L (ref 20–31)
CREAT SERPL-MCNC: 1.5 MG/DL (ref 0.7–1.2)
EOSINOPHIL # BLD: 0.39 K/UL (ref 0–0.4)
EOSINOPHILS RELATIVE PERCENT: 9 % (ref 1–4)
ERYTHROCYTE [DISTWIDTH] IN BLOOD BY AUTOMATED COUNT: 13.4 % (ref 12.5–15.4)
GFR, ESTIMATED: 48 ML/MIN/1.73M2
GLUCOSE SERPL-MCNC: 149 MG/DL (ref 70–99)
HCT VFR BLD AUTO: 38.9 % (ref 41–53)
HGB BLD-MCNC: 13.1 G/DL (ref 13.5–17.5)
LYMPHOCYTES NFR BLD: 0.52 K/UL (ref 1–4.8)
LYMPHOCYTES RELATIVE PERCENT: 12 % (ref 24–44)
MCH RBC QN AUTO: 28.2 PG (ref 26–34)
MCHC RBC AUTO-ENTMCNC: 33.8 G/DL (ref 31–37)
MCV RBC AUTO: 83.6 FL (ref 80–100)
METAMYELOCYTES ABSOLUTE COUNT: 0.09 K/UL
METAMYELOCYTES: 2 %
MONOCYTES NFR BLD: 0.3 K/UL (ref 0.1–0.8)
MONOCYTES NFR BLD: 7 % (ref 1–7)
MORPHOLOGY: ABNORMAL
NEUTROPHILS NFR BLD: 70 % (ref 36–66)
NEUTS SEG NFR BLD: 3 K/UL (ref 1.8–7.7)
PLATELET # BLD AUTO: 103 K/UL (ref 140–450)
PMV BLD AUTO: 10.6 FL (ref 6–12)
POTASSIUM SERPL-SCNC: 3.9 MMOL/L (ref 3.7–5.3)
PROT SERPL-MCNC: 7 G/DL (ref 6.4–8.3)
RBC # BLD AUTO: 4.66 M/UL (ref 4.5–5.9)
SODIUM SERPL-SCNC: 144 MMOL/L (ref 135–144)
WBC OTHER # BLD: 4.3 K/UL (ref 3.5–11)

## 2025-03-05 PROCEDURE — 36415 COLL VENOUS BLD VENIPUNCTURE: CPT

## 2025-03-05 PROCEDURE — 85025 COMPLETE CBC W/AUTO DIFF WBC: CPT

## 2025-03-05 PROCEDURE — 80053 COMPREHEN METABOLIC PANEL: CPT

## 2025-03-12 ENCOUNTER — HOSPITAL ENCOUNTER (OUTPATIENT)
Age: 75
Discharge: HOME | End: 2025-03-12
Payer: MEDICARE

## 2025-03-12 DIAGNOSIS — D69.3 ACUTE IDIOPATHIC THROMBOCYTOPENIC PURPURA (HCC): ICD-10-CM

## 2025-03-12 LAB
ALBUMIN SERPL-MCNC: 4.4 G/DL (ref 3.5–5.2)
ALBUMIN/GLOB SERPL: 1.3 {RATIO} (ref 1–2.5)
ALP SERPL-CCNC: 60 U/L (ref 40–129)
ALT SERPL-CCNC: 14 U/L (ref 5–41)
ANION GAP SERPL CALCULATED.3IONS-SCNC: 12 MMOL/L (ref 9–17)
AST SERPL-CCNC: 21 U/L
BASOPHILS # BLD: 0 K/UL (ref 0–0.2)
BASOPHILS NFR BLD: 1 % (ref 0–2)
BILIRUB SERPL-MCNC: 0.4 MG/DL (ref 0.3–1.2)
BUN SERPL-MCNC: 30 MG/DL (ref 8–23)
CALCIUM SERPL-MCNC: 9.7 MG/DL (ref 8.6–10.4)
CHLORIDE SERPL-SCNC: 110 MMOL/L (ref 98–107)
CO2 SERPL-SCNC: 23 MMOL/L (ref 20–31)
CREAT SERPL-MCNC: 1.6 MG/DL (ref 0.7–1.2)
EOSINOPHIL # BLD: 0.3 K/UL (ref 0–0.4)
EOSINOPHILS RELATIVE PERCENT: 4 % (ref 1–4)
ERYTHROCYTE [DISTWIDTH] IN BLOOD BY AUTOMATED COUNT: 13.8 % (ref 12.5–15.4)
GFR, ESTIMATED: 45 ML/MIN/1.73M2
GLUCOSE SERPL-MCNC: 108 MG/DL (ref 70–99)
HCT VFR BLD AUTO: 41.5 % (ref 41–53)
HGB BLD-MCNC: 13.9 G/DL (ref 13.5–17.5)
LYMPHOCYTES NFR BLD: 0.5 K/UL (ref 1–4.8)
LYMPHOCYTES RELATIVE PERCENT: 8 % (ref 24–44)
MCH RBC QN AUTO: 28.4 PG (ref 26–34)
MCHC RBC AUTO-ENTMCNC: 33.5 G/DL (ref 31–37)
MCV RBC AUTO: 84.7 FL (ref 80–100)
MONOCYTES NFR BLD: 0.8 K/UL (ref 0.1–1.2)
MONOCYTES NFR BLD: 12 % (ref 2–11)
NEUTROPHILS NFR BLD: 75 % (ref 36–66)
NEUTS SEG NFR BLD: 4.8 K/UL (ref 1.8–7.7)
PLATELET # BLD AUTO: 143 K/UL (ref 140–450)
PMV BLD AUTO: 10.8 FL (ref 6–12)
POTASSIUM SERPL-SCNC: 4.4 MMOL/L (ref 3.7–5.3)
PROT SERPL-MCNC: 7.7 G/DL (ref 6.4–8.3)
RBC # BLD AUTO: 4.9 M/UL (ref 4.5–5.9)
SODIUM SERPL-SCNC: 145 MMOL/L (ref 135–144)
WBC OTHER # BLD: 6.4 K/UL (ref 3.5–11)

## 2025-03-12 PROCEDURE — 80053 COMPREHEN METABOLIC PANEL: CPT

## 2025-03-12 PROCEDURE — 36415 COLL VENOUS BLD VENIPUNCTURE: CPT

## 2025-03-12 PROCEDURE — 85025 COMPLETE CBC W/AUTO DIFF WBC: CPT

## 2025-03-13 ENCOUNTER — OFFICE VISIT (OUTPATIENT)
Dept: ONCOLOGY | Age: 75
End: 2025-03-13
Payer: MEDICARE

## 2025-03-13 VITALS
HEART RATE: 56 BPM | WEIGHT: 159.8 LBS | OXYGEN SATURATION: 94 % | BODY MASS INDEX: 22.93 KG/M2 | RESPIRATION RATE: 18 BRPM | SYSTOLIC BLOOD PRESSURE: 168 MMHG | TEMPERATURE: 97.5 F | DIASTOLIC BLOOD PRESSURE: 78 MMHG

## 2025-03-13 DIAGNOSIS — D69.3 IDIOPATHIC THROMBOCYTOPENIC PURPURA (HCC): Primary | ICD-10-CM

## 2025-03-13 PROCEDURE — 99211 OFF/OP EST MAY X REQ PHY/QHP: CPT | Performed by: INTERNAL MEDICINE

## 2025-03-13 PROCEDURE — 1126F AMNT PAIN NOTED NONE PRSNT: CPT | Performed by: INTERNAL MEDICINE

## 2025-03-13 PROCEDURE — 3078F DIAST BP <80 MM HG: CPT | Performed by: INTERNAL MEDICINE

## 2025-03-13 PROCEDURE — 1123F ACP DISCUSS/DSCN MKR DOCD: CPT | Performed by: INTERNAL MEDICINE

## 2025-03-13 PROCEDURE — 1159F MED LIST DOCD IN RCRD: CPT | Performed by: INTERNAL MEDICINE

## 2025-03-13 PROCEDURE — 3077F SYST BP >= 140 MM HG: CPT | Performed by: INTERNAL MEDICINE

## 2025-03-13 PROCEDURE — 99214 OFFICE O/P EST MOD 30 MIN: CPT | Performed by: INTERNAL MEDICINE

## 2025-03-13 RX ORDER — VITAMIN B COMPLEX
1 CAPSULE ORAL DAILY
COMMUNITY

## 2025-03-13 NOTE — PROGRESS NOTES
DIAGNOSIS:    ITP   Multiple adenopathy   CURRENT THERAPY:  Received Steroids with no response  IVIG with short lived response June/24  Rituximab, started July/2024  Unfortunately, short response to rituximab, plan to transition to avatrombopag September/2024 2/2025, relapsed disease with worsening platelet count.  Managed with adjusted dose of avatrombopag  Dose escalated avatrombopag led to excellent response  BRIEF CASE HISTORY:       The patient is a 74 y.o.   male who is admitted to the hospital for petechiae and was found to have severe thrombocytopenia which is new onset.  The patient describes fatigue, mucosal bleeding from the mouth.  No hemorrhagic diatheses otherwise.  No hematuria or blood in the stool.   The patient was here couple weeks ago with low platelets and was diagnosed with ITP.  We wanted to do a bone marrow aspiration and biopsy but could not be done at while in-house.  The patient was steroid refractory and responded briefly to IVIG.  Also CT scan showed multiple and lymphadenopathy concerning for lymphomatous masses, especially in mediastinum and retroperitoneum   Pt received Rituximab, bone marrow bx showed no primary BM disorder. 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   He received 4 doses of rituximab which were well-tolerated he had a good but brief response and relapsed quickly.  We decided to treat him with avatrombopag with excellent response  In February/2025, his ITP relapsed so we increased the dose.  That was associate with excellent response.  INTERIM HISTORY  This is unscheduled visit.  The patient has been tolerating avatrombopag extremely well.  With dose adjustment, he is having great response and platelets are back to normal.  Patient comes in today to the clinic.  He denies any bruising or bleeding.  Symptoms including fatigue and balance issues are about the same as baseline.    Past Medical History:   has a past medical history of Hyperlipidemia, Hypertension, and

## 2025-04-15 ENCOUNTER — HOSPITAL ENCOUNTER (OUTPATIENT)
Age: 75
Discharge: HOME | End: 2025-04-15
Payer: MEDICARE

## 2025-04-15 ENCOUNTER — HOSPITAL ENCOUNTER (INPATIENT)
Age: 75
LOS: 4 days | Discharge: HOME | End: 2025-04-19
Attending: EMERGENCY MEDICINE | Admitting: INTERNAL MEDICINE
Payer: MEDICARE

## 2025-04-15 ENCOUNTER — TELEPHONE (OUTPATIENT)
Dept: INFUSION THERAPY | Age: 75
End: 2025-04-15

## 2025-04-15 DIAGNOSIS — D69.3 CHRONIC ITP (IDIOPATHIC THROMBOCYTOPENIC PURPURA) (HCC): ICD-10-CM

## 2025-04-15 DIAGNOSIS — D69.6 THROMBOCYTOPENIA: Primary | ICD-10-CM

## 2025-04-15 DIAGNOSIS — D69.3 ACUTE IDIOPATHIC THROMBOCYTOPENIC PURPURA (HCC): ICD-10-CM

## 2025-04-15 LAB
BASOPHILS # BLD: 0 K/UL (ref 0–0.2)
BASOPHILS NFR BLD: 0 % (ref 0–2)
EOSINOPHIL # BLD: 0.32 K/UL (ref 0–0.4)
EOSINOPHILS RELATIVE PERCENT: 6 % (ref 1–4)
ERYTHROCYTE [DISTWIDTH] IN BLOOD BY AUTOMATED COUNT: 14.1 % (ref 12.5–15.4)
ERYTHROCYTE [DISTWIDTH] IN BLOOD BY AUTOMATED COUNT: 14.2 % (ref 12.5–15.4)
HCT VFR BLD AUTO: 38 % (ref 41–53)
HCT VFR BLD AUTO: 39.1 % (ref 41–53)
HGB BLD-MCNC: 12.8 G/DL (ref 13.5–17.5)
HGB BLD-MCNC: 13.2 G/DL (ref 13.5–17.5)
LYMPHOCYTES NFR BLD: 0.65 K/UL (ref 1–4.8)
LYMPHOCYTES RELATIVE PERCENT: 12 % (ref 24–44)
MCH RBC QN AUTO: 28.2 PG (ref 26–34)
MCH RBC QN AUTO: 28.4 PG (ref 26–34)
MCHC RBC AUTO-ENTMCNC: 33.7 G/DL (ref 31–37)
MCHC RBC AUTO-ENTMCNC: 33.8 G/DL (ref 31–37)
MCV RBC AUTO: 83.4 FL (ref 80–100)
MCV RBC AUTO: 83.8 FL (ref 80–100)
MONOCYTES NFR BLD: 0.81 K/UL (ref 0.1–0.8)
MONOCYTES NFR BLD: 15 % (ref 1–7)
MORPHOLOGY: NORMAL
NEUTROPHILS NFR BLD: 67 % (ref 36–66)
NEUTS SEG NFR BLD: 3.62 K/UL (ref 1.8–7.7)
PLATELET # BLD AUTO: 5 K/UL (ref 140–450)
PLATELET # BLD AUTO: 6 K/UL (ref 140–450)
PMV BLD AUTO: 11.5 FL (ref 6–12)
PMV BLD AUTO: 11.5 FL (ref 6–12)
RBC # BLD AUTO: 4.53 M/UL (ref 4.5–5.9)
RBC # BLD AUTO: 4.69 M/UL (ref 4.5–5.9)
WBC OTHER # BLD: 5.4 K/UL (ref 3.5–11)
WBC OTHER # BLD: 6.3 K/UL (ref 3.5–11)

## 2025-04-15 PROCEDURE — 2060000000 HC ICU INTERMEDIATE R&B

## 2025-04-15 PROCEDURE — 85027 COMPLETE CBC AUTOMATED: CPT

## 2025-04-15 PROCEDURE — 6370000000 HC RX 637 (ALT 250 FOR IP)

## 2025-04-15 PROCEDURE — 6360000002 HC RX W HCPCS

## 2025-04-15 PROCEDURE — 99285 EMERGENCY DEPT VISIT HI MDM: CPT

## 2025-04-15 PROCEDURE — 99222 1ST HOSP IP/OBS MODERATE 55: CPT

## 2025-04-15 PROCEDURE — 36415 COLL VENOUS BLD VENIPUNCTURE: CPT

## 2025-04-15 PROCEDURE — 6370000000 HC RX 637 (ALT 250 FOR IP): Performed by: NURSE PRACTITIONER

## 2025-04-15 PROCEDURE — 30233S1 TRANSFUSION OF NONAUTOLOGOUS GLOBULIN INTO PERIPHERAL VEIN, PERCUTANEOUS APPROACH: ICD-10-PCS | Performed by: INTERNAL MEDICINE

## 2025-04-15 PROCEDURE — 2500000003 HC RX 250 WO HCPCS

## 2025-04-15 PROCEDURE — 85025 COMPLETE CBC W/AUTO DIFF WBC: CPT

## 2025-04-15 PROCEDURE — 6370000000 HC RX 637 (ALT 250 FOR IP): Performed by: INTERNAL MEDICINE

## 2025-04-15 RX ORDER — ALBUTEROL SULFATE 90 UG/1
4 INHALANT RESPIRATORY (INHALATION) PRN
OUTPATIENT
Start: 2025-04-15

## 2025-04-15 RX ORDER — CARVEDILOL 12.5 MG/1
25 TABLET ORAL 2 TIMES DAILY WITH MEALS
Status: DISCONTINUED | OUTPATIENT
Start: 2025-04-16 | End: 2025-04-15

## 2025-04-15 RX ORDER — BISACODYL 10 MG
10 SUPPOSITORY, RECTAL RECTAL DAILY PRN
Status: DISCONTINUED | OUTPATIENT
Start: 2025-04-15 | End: 2025-04-19 | Stop reason: HOSPADM

## 2025-04-15 RX ORDER — DIPHENHYDRAMINE HYDROCHLORIDE 50 MG/ML
0.5 INJECTION, SOLUTION INTRAMUSCULAR; INTRAVENOUS EVERY 6 HOURS PRN
Status: DISCONTINUED | OUTPATIENT
Start: 2025-04-15 | End: 2025-04-19 | Stop reason: HOSPADM

## 2025-04-15 RX ORDER — SODIUM CHLORIDE 9 MG/ML
5-250 INJECTION, SOLUTION INTRAVENOUS PRN
OUTPATIENT
Start: 2025-04-15

## 2025-04-15 RX ORDER — PANTOPRAZOLE SODIUM 40 MG/1
40 TABLET, DELAYED RELEASE ORAL
Status: DISCONTINUED | OUTPATIENT
Start: 2025-04-16 | End: 2025-04-19 | Stop reason: HOSPADM

## 2025-04-15 RX ORDER — CETIRIZINE HYDROCHLORIDE 10 MG/1
10 TABLET ORAL DAILY
Status: DISCONTINUED | OUTPATIENT
Start: 2025-04-16 | End: 2025-04-15

## 2025-04-15 RX ORDER — HYDROCORTISONE SODIUM SUCCINATE 100 MG/2ML
100 INJECTION INTRAMUSCULAR; INTRAVENOUS
OUTPATIENT
Start: 2025-04-15

## 2025-04-15 RX ORDER — MAGNESIUM SULFATE IN WATER 40 MG/ML
2000 INJECTION, SOLUTION INTRAVENOUS PRN
Status: DISCONTINUED | OUTPATIENT
Start: 2025-04-15 | End: 2025-04-19 | Stop reason: HOSPADM

## 2025-04-15 RX ORDER — ONDANSETRON 4 MG/1
4 TABLET, ORALLY DISINTEGRATING ORAL EVERY 8 HOURS PRN
Status: DISCONTINUED | OUTPATIENT
Start: 2025-04-15 | End: 2025-04-19 | Stop reason: HOSPADM

## 2025-04-15 RX ORDER — SODIUM CHLORIDE 9 MG/ML
INJECTION, SOLUTION INTRAVENOUS PRN
Status: DISCONTINUED | OUTPATIENT
Start: 2025-04-15 | End: 2025-04-19 | Stop reason: HOSPADM

## 2025-04-15 RX ORDER — MEPERIDINE HYDROCHLORIDE 50 MG/ML
12.5 INJECTION INTRAMUSCULAR; INTRAVENOUS; SUBCUTANEOUS PRN
OUTPATIENT
Start: 2025-04-15

## 2025-04-15 RX ORDER — DILTIAZEM HYDROCHLORIDE 120 MG/1
120 CAPSULE, COATED, EXTENDED RELEASE ORAL DAILY
Status: DISCONTINUED | OUTPATIENT
Start: 2025-04-16 | End: 2025-04-15

## 2025-04-15 RX ORDER — EPINEPHRINE 1 MG/ML
0.3 INJECTION, SOLUTION, CONCENTRATE INTRAVENOUS PRN
OUTPATIENT
Start: 2025-04-15

## 2025-04-15 RX ORDER — LANOLIN ALCOHOL/MO/W.PET/CERES
400 CREAM (GRAM) TOPICAL DAILY
Status: DISCONTINUED | OUTPATIENT
Start: 2025-04-16 | End: 2025-04-19 | Stop reason: HOSPADM

## 2025-04-15 RX ORDER — FAMOTIDINE 10 MG/ML
20 INJECTION, SOLUTION INTRAVENOUS
OUTPATIENT
Start: 2025-04-15

## 2025-04-15 RX ORDER — DIPHENHYDRAMINE HYDROCHLORIDE 50 MG/ML
50 INJECTION, SOLUTION INTRAMUSCULAR; INTRAVENOUS
OUTPATIENT
Start: 2025-04-15

## 2025-04-15 RX ORDER — LISINOPRIL 10 MG/1
40 TABLET ORAL DAILY
Status: DISCONTINUED | OUTPATIENT
Start: 2025-04-16 | End: 2025-04-15

## 2025-04-15 RX ORDER — CARVEDILOL 12.5 MG/1
25 TABLET ORAL 2 TIMES DAILY WITH MEALS
Status: DISCONTINUED | OUTPATIENT
Start: 2025-04-15 | End: 2025-04-17

## 2025-04-15 RX ORDER — CETIRIZINE HYDROCHLORIDE 10 MG/1
10 TABLET ORAL NIGHTLY
Status: DISCONTINUED | OUTPATIENT
Start: 2025-04-15 | End: 2025-04-19 | Stop reason: HOSPADM

## 2025-04-15 RX ORDER — ACETAMINOPHEN 650 MG/1
650 SUPPOSITORY RECTAL EVERY 6 HOURS PRN
Status: DISCONTINUED | OUTPATIENT
Start: 2025-04-15 | End: 2025-04-19 | Stop reason: HOSPADM

## 2025-04-15 RX ORDER — ATORVASTATIN CALCIUM 40 MG/1
40 TABLET, FILM COATED ORAL NIGHTLY
Status: DISCONTINUED | OUTPATIENT
Start: 2025-04-15 | End: 2025-04-18

## 2025-04-15 RX ORDER — HEPARIN SODIUM (PORCINE) LOCK FLUSH IV SOLN 100 UNIT/ML 100 UNIT/ML
500 SOLUTION INTRAVENOUS PRN
OUTPATIENT
Start: 2025-04-15

## 2025-04-15 RX ORDER — POTASSIUM CHLORIDE 7.45 MG/ML
10 INJECTION INTRAVENOUS PRN
Status: DISCONTINUED | OUTPATIENT
Start: 2025-04-15 | End: 2025-04-19 | Stop reason: HOSPADM

## 2025-04-15 RX ORDER — DILTIAZEM HYDROCHLORIDE 30 MG/1
60 TABLET, FILM COATED ORAL EVERY 8 HOURS SCHEDULED
Status: DISCONTINUED | OUTPATIENT
Start: 2025-04-15 | End: 2025-04-15

## 2025-04-15 RX ORDER — ACETAMINOPHEN 325 MG/1
650 TABLET ORAL ONCE
OUTPATIENT
Start: 2025-04-15 | End: 2025-04-15

## 2025-04-15 RX ORDER — B12/LEVOMEFOLATE CALCIUM/B-6 2-1.13-25
1 TABLET ORAL DAILY
Status: DISCONTINUED | OUTPATIENT
Start: 2025-04-16 | End: 2025-04-19 | Stop reason: HOSPADM

## 2025-04-15 RX ORDER — SODIUM CHLORIDE 0.9 % (FLUSH) 0.9 %
5-40 SYRINGE (ML) INJECTION PRN
Status: DISCONTINUED | OUTPATIENT
Start: 2025-04-15 | End: 2025-04-19 | Stop reason: HOSPADM

## 2025-04-15 RX ORDER — ACETAMINOPHEN 325 MG/1
650 TABLET ORAL
OUTPATIENT
Start: 2025-04-15

## 2025-04-15 RX ORDER — ACETAMINOPHEN 325 MG/1
650 TABLET ORAL EVERY 6 HOURS PRN
Status: DISCONTINUED | OUTPATIENT
Start: 2025-04-15 | End: 2025-04-19 | Stop reason: HOSPADM

## 2025-04-15 RX ORDER — DILTIAZEM HYDROCHLORIDE 120 MG/1
120 CAPSULE, COATED, EXTENDED RELEASE ORAL DAILY
Status: DISCONTINUED | OUTPATIENT
Start: 2025-04-15 | End: 2025-04-15 | Stop reason: SDUPTHER

## 2025-04-15 RX ORDER — ATORVASTATIN CALCIUM 40 MG/1
40 TABLET, FILM COATED ORAL DAILY
Status: DISCONTINUED | OUTPATIENT
Start: 2025-04-16 | End: 2025-04-15

## 2025-04-15 RX ORDER — SODIUM CHLORIDE 9 MG/ML
INJECTION, SOLUTION INTRAVENOUS CONTINUOUS
OUTPATIENT
Start: 2025-04-15

## 2025-04-15 RX ORDER — SODIUM CHLORIDE 0.9 % (FLUSH) 0.9 %
5-40 SYRINGE (ML) INJECTION EVERY 12 HOURS SCHEDULED
Status: DISCONTINUED | OUTPATIENT
Start: 2025-04-15 | End: 2025-04-19 | Stop reason: HOSPADM

## 2025-04-15 RX ORDER — BRIMONIDINE TARTRATE 2 MG/ML
1 SOLUTION/ DROPS OPHTHALMIC 2 TIMES DAILY
Status: DISCONTINUED | OUTPATIENT
Start: 2025-04-15 | End: 2025-04-19 | Stop reason: HOSPADM

## 2025-04-15 RX ORDER — ONDANSETRON 2 MG/ML
8 INJECTION INTRAMUSCULAR; INTRAVENOUS
OUTPATIENT
Start: 2025-04-15

## 2025-04-15 RX ORDER — POLYETHYLENE GLYCOL 3350 17 G/17G
17 POWDER, FOR SOLUTION ORAL DAILY PRN
Status: DISCONTINUED | OUTPATIENT
Start: 2025-04-15 | End: 2025-04-19 | Stop reason: HOSPADM

## 2025-04-15 RX ORDER — POTASSIUM CHLORIDE 1500 MG/1
40 TABLET, EXTENDED RELEASE ORAL PRN
Status: DISCONTINUED | OUTPATIENT
Start: 2025-04-15 | End: 2025-04-19 | Stop reason: HOSPADM

## 2025-04-15 RX ORDER — FENOFIBRATE 160 MG/1
160 TABLET ORAL DAILY
Status: DISCONTINUED | OUTPATIENT
Start: 2025-04-16 | End: 2025-04-19 | Stop reason: HOSPADM

## 2025-04-15 RX ORDER — SODIUM CHLORIDE 0.9 % (FLUSH) 0.9 %
5-40 SYRINGE (ML) INJECTION PRN
OUTPATIENT
Start: 2025-04-15

## 2025-04-15 RX ORDER — DIPHENHYDRAMINE HCL 25 MG
25 TABLET ORAL ONCE
OUTPATIENT
Start: 2025-04-15 | End: 2025-04-15

## 2025-04-15 RX ORDER — LISINOPRIL 20 MG/1
40 TABLET ORAL DAILY
Status: DISCONTINUED | OUTPATIENT
Start: 2025-04-15 | End: 2025-04-19 | Stop reason: HOSPADM

## 2025-04-15 RX ORDER — DILTIAZEM HYDROCHLORIDE 120 MG/1
120 CAPSULE, COATED, EXTENDED RELEASE ORAL DAILY
Status: DISCONTINUED | OUTPATIENT
Start: 2025-04-15 | End: 2025-04-18

## 2025-04-15 RX ORDER — ONDANSETRON 2 MG/ML
4 INJECTION INTRAMUSCULAR; INTRAVENOUS EVERY 6 HOURS PRN
Status: DISCONTINUED | OUTPATIENT
Start: 2025-04-15 | End: 2025-04-19 | Stop reason: HOSPADM

## 2025-04-15 RX ADMIN — ATORVASTATIN CALCIUM 40 MG: 40 TABLET, FILM COATED ORAL at 23:27

## 2025-04-15 RX ADMIN — IMMUNE GLOBULIN (HUMAN) 75000 MG: 10 INJECTION INTRAVENOUS; SUBCUTANEOUS at 23:51

## 2025-04-15 RX ADMIN — CETIRIZINE HYDROCHLORIDE 10 MG: 10 TABLET, FILM COATED ORAL at 23:27

## 2025-04-15 RX ADMIN — DILTIAZEM HYDROCHLORIDE 120 MG: 120 CAPSULE, COATED, EXTENDED RELEASE ORAL at 23:28

## 2025-04-15 RX ADMIN — CARVEDILOL 25 MG: 12.5 TABLET, FILM COATED ORAL at 23:27

## 2025-04-15 RX ADMIN — SODIUM CHLORIDE, PRESERVATIVE FREE 10 ML: 5 INJECTION INTRAVENOUS at 23:30

## 2025-04-15 RX ADMIN — BRIMONIDINE TARTRATE 1 DROP: 2 SOLUTION OPHTHALMIC at 23:32

## 2025-04-15 ASSESSMENT — LIFESTYLE VARIABLES
HOW OFTEN DO YOU HAVE A DRINK CONTAINING ALCOHOL: NEVER
HOW MANY STANDARD DRINKS CONTAINING ALCOHOL DO YOU HAVE ON A TYPICAL DAY: PATIENT DOES NOT DRINK

## 2025-04-15 ASSESSMENT — PAIN SCALES - GENERAL
PAINLEVEL_OUTOF10: 0
PAINLEVEL_OUTOF10: 0

## 2025-04-15 NOTE — ED PROVIDER NOTES
[address]      Pt Name: Bruno Morales  MRN: [id number]  Birthdate 1950  Date of evaluation: 4/15/2025    EMERGENCY DEPARTMENT ENCOUNTER      PERTINENT ATTENDING PHYSICIAN COMMENTS:      Faculty Attestation    I performed a history and physical examination of the patient and discussed management with the mid level provideer. I reviewed the mid level provider's note and agree with the documented findings and plan of care.Any areas of disagreement are noted on the chart. I was personally present for the key portions of any procedures. I have documented in the chart those procedures where I was not present during the key portions. I have reviewed the emergency nurses triage note. I agree with the chief complaint, past medical history, past surgical history, allergies, medications, social and family history as documented unless otherwise noted below. Documentation of the HPI, Physical Exam and Medical Decision Making performed by medical students or scribes is based on my personal performance of the HPI, PE and MDM. For Residents/Physician Assistant/ Nurse Practitioner cases/documentation I have personally evaluated this patient and have completed at least one if not all key elements of the E/M (history, physical exam, and MDM). Additional findings are as noted.    CHIEF COMPLAINT       Chief Complaint   Patient presents with    Abnormal Lab     Blood work is checked once month- Doptelet twice a day 20mg  Pt had blood work this morning, platelets was 5 today- pt is asymptomatic        HISTORY OF PRESENT ILLNESS    Bruno Morales is a 74 y.o. male who presents to the emergency department with complaint of abnormal labs.  Patient has a history of ITP.  Was sent here by Dr. Gabriel.  His platelet count was 4.  Patient denies any active bleeding.  Denies any chest pain, shortness of breath.  Denies any nausea, vomiting, diarrhea, constipation, or abdominal pain.  Denies any headache, visual disturbance or speech

## 2025-04-16 LAB
ANION GAP SERPL CALCULATED.3IONS-SCNC: 9 MMOL/L (ref 9–17)
BASOPHILS # BLD: 0 K/UL (ref 0–0.2)
BASOPHILS NFR BLD: 0 % (ref 0–2)
BUN SERPL-MCNC: 33 MG/DL (ref 8–23)
CALCIUM SERPL-MCNC: 8.5 MG/DL (ref 8.6–10.4)
CHLORIDE SERPL-SCNC: 110 MMOL/L (ref 98–107)
CO2 SERPL-SCNC: 21 MMOL/L (ref 20–31)
CREAT SERPL-MCNC: 1.4 MG/DL (ref 0.7–1.2)
EOSINOPHIL # BLD: 0.25 K/UL (ref 0–0.4)
EOSINOPHILS RELATIVE PERCENT: 6 % (ref 1–4)
ERYTHROCYTE [DISTWIDTH] IN BLOOD BY AUTOMATED COUNT: 13.7 % (ref 12.5–15.4)
GFR, ESTIMATED: 52 ML/MIN/1.73M2
GLUCOSE SERPL-MCNC: 92 MG/DL (ref 70–99)
HCT VFR BLD AUTO: 33.9 % (ref 41–53)
HGB BLD-MCNC: 11.5 G/DL (ref 13.5–17.5)
LYMPHOCYTES NFR BLD: 0.67 K/UL (ref 1–4.8)
LYMPHOCYTES RELATIVE PERCENT: 16 % (ref 24–44)
MCH RBC QN AUTO: 28.5 PG (ref 26–34)
MCHC RBC AUTO-ENTMCNC: 34 G/DL (ref 31–37)
MCV RBC AUTO: 83.7 FL (ref 80–100)
MONOCYTES NFR BLD: 0.34 K/UL (ref 0.1–0.8)
MONOCYTES NFR BLD: 8 % (ref 1–7)
MORPHOLOGY: NORMAL
NEUTROPHILS NFR BLD: 70 % (ref 36–66)
NEUTS SEG NFR BLD: 2.94 K/UL (ref 1.8–7.7)
PLATELET # BLD AUTO: 20 K/UL (ref 140–450)
PMV BLD AUTO: 11.4 FL (ref 6–12)
POTASSIUM SERPL-SCNC: 3.7 MMOL/L (ref 3.7–5.3)
PSA SERPL-MCNC: 3.19 NG/ML (ref 0–4)
RBC # BLD AUTO: 4.05 M/UL (ref 4.5–5.9)
SODIUM SERPL-SCNC: 140 MMOL/L (ref 135–144)
WBC OTHER # BLD: 4.2 K/UL (ref 3.5–11)

## 2025-04-16 PROCEDURE — 99223 1ST HOSP IP/OBS HIGH 75: CPT | Performed by: INTERNAL MEDICINE

## 2025-04-16 PROCEDURE — 97166 OT EVAL MOD COMPLEX 45 MIN: CPT

## 2025-04-16 PROCEDURE — 88184 FLOWCYTOMETRY/ TC 1 MARKER: CPT

## 2025-04-16 PROCEDURE — 6370000000 HC RX 637 (ALT 250 FOR IP)

## 2025-04-16 PROCEDURE — 2060000000 HC ICU INTERMEDIATE R&B

## 2025-04-16 PROCEDURE — 6360000002 HC RX W HCPCS: Performed by: INTERNAL MEDICINE

## 2025-04-16 PROCEDURE — 97162 PT EVAL MOD COMPLEX 30 MIN: CPT

## 2025-04-16 PROCEDURE — 80048 BASIC METABOLIC PNL TOTAL CA: CPT

## 2025-04-16 PROCEDURE — 97535 SELF CARE MNGMENT TRAINING: CPT

## 2025-04-16 PROCEDURE — 36415 COLL VENOUS BLD VENIPUNCTURE: CPT

## 2025-04-16 PROCEDURE — 2500000003 HC RX 250 WO HCPCS

## 2025-04-16 PROCEDURE — 97116 GAIT TRAINING THERAPY: CPT

## 2025-04-16 PROCEDURE — 6370000000 HC RX 637 (ALT 250 FOR IP): Performed by: NURSE PRACTITIONER

## 2025-04-16 PROCEDURE — 84153 ASSAY OF PSA TOTAL: CPT

## 2025-04-16 PROCEDURE — 85025 COMPLETE CBC W/AUTO DIFF WBC: CPT

## 2025-04-16 PROCEDURE — 88185 FLOWCYTOMETRY/TC ADD-ON: CPT

## 2025-04-16 PROCEDURE — 6370000000 HC RX 637 (ALT 250 FOR IP): Performed by: INTERNAL MEDICINE

## 2025-04-16 RX ADMIN — SODIUM CHLORIDE, PRESERVATIVE FREE 10 ML: 5 INJECTION INTRAVENOUS at 08:05

## 2025-04-16 RX ADMIN — ATORVASTATIN CALCIUM 40 MG: 40 TABLET, FILM COATED ORAL at 21:00

## 2025-04-16 RX ADMIN — FENOFIBRATE 160 MG: 160 TABLET ORAL at 08:00

## 2025-04-16 RX ADMIN — IMMUNE GLOBULIN (HUMAN) 70000 MG: 10 INJECTION INTRAVENOUS; SUBCUTANEOUS at 16:49

## 2025-04-16 RX ADMIN — Medication 400 MG: at 11:43

## 2025-04-16 RX ADMIN — CETIRIZINE HYDROCHLORIDE 10 MG: 10 TABLET, FILM COATED ORAL at 21:00

## 2025-04-16 RX ADMIN — LISINOPRIL 40 MG: 20 TABLET ORAL at 08:01

## 2025-04-16 RX ADMIN — DILTIAZEM HYDROCHLORIDE 120 MG: 120 CAPSULE, COATED, EXTENDED RELEASE ORAL at 21:00

## 2025-04-16 RX ADMIN — CARVEDILOL 25 MG: 12.5 TABLET, FILM COATED ORAL at 08:02

## 2025-04-16 RX ADMIN — CARVEDILOL 25 MG: 12.5 TABLET, FILM COATED ORAL at 16:45

## 2025-04-16 RX ADMIN — SODIUM CHLORIDE, PRESERVATIVE FREE 10 ML: 5 INJECTION INTRAVENOUS at 21:02

## 2025-04-16 RX ADMIN — BRIMONIDINE TARTRATE 1 DROP: 2 SOLUTION OPHTHALMIC at 21:03

## 2025-04-16 RX ADMIN — BRIMONIDINE TARTRATE 1 DROP: 2 SOLUTION OPHTHALMIC at 08:04

## 2025-04-16 ASSESSMENT — PAIN SCALES - GENERAL: PAINLEVEL_OUTOF10: 0

## 2025-04-16 NOTE — PROGRESS NOTES
Occupational Therapy  Occupational Therapy Initial Evaluation  Facility/Department: [address]   Patient Name: Bruno Morales        MRN: [id number]    : 1950    Date of Service: 2025    Chief Complaint   Patient presents with    Abnormal Lab     Blood work is checked once month- Doptelet twice a day 20mg  Pt had blood work this morning, platelets was 5 today- pt is asymptomatic     Past Medical History:  has a past medical history of Hyperlipidemia, Hypertension, and Thrombocytopenia.  Past Surgical History:  has a past surgical history that includes Tonsillectomy and CT BIOPSY BONE MARROW (7/3/2024).    Discharge Recommendations  Discharge Recommendations: Patient would benefit from continued therapy after discharge    Assessment  Performance deficits / Impairments: Decreased functional mobility ;Decreased ADL status;Decreased safe awareness;Decreased cognition;Decreased endurance;Decreased balance;Decreased high-level IADLs  Assessment: Pt presents with above noted deficits impacting pt's ADL status. Pt engaged in functional transfers/functional mobility and ADL tasks with SBA-CGA required throughout, mostly due to unsteadiness with standing/mobility tasks however pt reports current balance deficits are baseline. Pt to benefit from continued therapy services while hospitalized and at discharge to maximize pt's safety and independence in performing functional tasks.  Prognosis: Good  Decision Making: Medium Complexity  REQUIRES OT FOLLOW-UP: Yes  Activity Tolerance  Activity Tolerance: Patient Tolerated treatment well  Safety Devices  Type of Devices: Call light within reach;Chair alarm in place;Nurse notified;Left in chair  Restraints  Restraints Initially in Place: No    AM-PAC  AM-PAC Daily Activity - Inpatient   How much help is needed for putting on and taking off regular lower body clothing?: A Little  How much help is needed for bathing (which includes washing, rinsing, drying)?: A Little  How  : No  Patient's  Info: spouse drives  Mode of Transportation: [facility]  Occupation: Retired  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  Leisure & Hobbies: Reading, using the computer    Vision/Hearing  Vision  Vision: Impaired  Vision Exceptions: Wears glasses for reading (Pt reports wearing glasses at all times \"to protect his cataract lenses if he falls\")  Hearing  Hearing: Within functional limits    BUE Assessment  Gross Assessment  AROM: Within functional limits  Strength: Within functional limits (grossly 4/5)  Coordination: Generally decreased, functional (FMC/GMC- decreased speed/accuracy)  Hand Dominance: Right     Objective  Orientation  Overall Orientation Status: Within Functional Limits  Orientation Level: Oriented X4  Cognition  Overall Cognitive Status: Exceptions  Safety Judgement: Decreased awareness of need for assistance;Decreased awareness of need for safety  Problem Solving: Assistance required to identify errors made;Decreased awareness of errors;Assistance required to generate solutions  Insights: Decreased awareness of deficits    Activities of Daily Living  Grooming: Increased time to complete;Contact guard assistance  Grooming Skilled Clinical Factors: Pt standing sink side to perform hand hygiene, CGA due to decreased unsupported standing balance while reaching for soap/towel  UE Bathing: Increased time to complete;Stand by assistance  LE Bathing: Increased time to complete;Contact guard assistance  UE Dressing: Increased time to complete;Stand by assistance  LE Dressing: Verbal cueing;Contact guard assistance  Putting On/Taking Off Footwear: Verbal cueing;Contact guard assistance  Toileting: Increased time to complete;Contact guard assistance  Toileting Skilled Clinical Factors: Toilet transfer with SBA, LB clothing mngt with CGA due to decreased unsupported standing balance    Balance  Balance  Sitting:  (SBA- static, CGA- dynamic)  Standing:  (SBA- static, CGA- dynamic;

## 2025-04-16 NOTE — PROGRESS NOTES
[facility]  Office: [phone]  Phani Andino DO, Carlos A Hernandez DO, Sandor Rivera DO, Dre Marroquin DO, Cecelia Murillo MD, Rosie Robbins MD, Castro Hope MD, Becka Sharma MD,  Wolf Reardon MD, Edna Truong MD, Jamee Padilla MD,  Bia Weir DO, Jacob Riley MD, Rk Be MD, Diego Andino DO, Viviana Marroquin MD,  Jesse Mahan DO, Paula Ramsay MD, Alyssia Henley MD, Chari Pastor MD,  Reynold Lennon MD, Luca Eckert MD, Speedy Durham MD, Devyn Moreno MD, Heber Valenzuela MD, David Paiz MD, Eagle Whitt DO, Maile Miranda MD, Sebastian Braswell MD, Bia Griffith MD, Mohsin Reza, MD, Queta Yao MD, Shirley Waterhouse, CNP,  Darlyn Savage, CNP, Eagle Yoon, CNP,  Luzmaria Mitchell, DNP, Tonja Vizcaino, CNP, Tiff Hamilton, CNP, Glenna Taylor, CNP, Lili Cool, CNP, Amina Harrell, PA-C, Letitia Lynch, CNP, Violette Romo, CNP,  Jonna Galvez, CNP, Viviana Ludwig, CNP, Hector Jackson, PA-C, Kelly Minor, CNP,  Lluvia Anand, CNS, Marilynn Acuña, CNP, Ashley Benítez, CNP,   Poppy Hughes, CNP              [facility]   IN-PATIENT SERVICE   [facility]     4/16/2025    1:16 PM    Name:   Bruno Morales  MRN:     [id number]     Acct:      [id number]   Room:   332/332-01   Day:  1  Admit Date:  4/15/2025  7:40 PM    PCP:   Margarette Vallejo MD  Code Status:  Full Code    Subjective:     C/C:   Chief Complaint   Patient presents with    Abnormal Lab     Blood work is checked once month- Doptelet twice a day 20mg  Pt had blood work this morning, platelets was 5 today- pt is asymptomatic         Interval History Status:     Patient seen to be sitting up in bed, denies any symptoms of bleeding.  Patient vitals, labs and all providers notes were reviewed,from overnight shift and morning updates were noted and discussed with the nurse       Brief history     Bruno Morales is a 74 y.o.  /  male who presents with

## 2025-04-16 NOTE — CONSULTS
Today's Date: 4/16/2025  Patient Name: Bruno Morales  Date of admission: 4/15/2025  7:40 PM  Patient's age: 74 y.o., 1950  Admission Dx: Thrombocytopenia [D69.6]  Acute idiopathic thrombocytopenic purpura (HCC) [D69.3]  Chronic ITP (idiopathic thrombocytopenic purpura) (HCC) [D69.3]    Reason for Consult: Recurrent resistant ITP  Requesting Physician: Maile Miranda MD    CHIEF COMPLAINT: Severe thrombocytopenia    History Obtained From:  patient    HISTORY OF PRESENT ILLNESS:      The patient is a 74 y.o.  male who is admitted to the hospital for severe thrombocytopenia at 5, patient had history of ITP with no response initially to steroid and short response to IVIG back in June 2024 and recurred on rituximab quickly and currently on avatrombopag started on September 2024 with maximizing dose on February presented with platelet of 5 but no bleed,Also CT scan showed multiple and lymphadenopathy concerning for lymphomatous masses, especially in mediastinum and retroperitoneum   bone marrow bx showed no primary BM disorder     Past Medical History:   has a past medical history of Hyperlipidemia, Hypertension, and Thrombocytopenia.    Past Surgical History:   has a past surgical history that includes Tonsillectomy and CT BIOPSY BONE MARROW (7/3/2024).     Medications:    Reviewed in Epic     Allergies:  Asa [aspirin], Fish-derived products, Penicillins, and Tetracyclines & related    Social History:   reports that he has never smoked. He has never used smokeless tobacco. He reports that he does not drink alcohol and does not use drugs.     Family History: family history includes Dementia in his mother; Parkinson's Disease in his father.    REVIEW OF SYSTEMS:    14 point review of system has been obtained unremarkable although it was mentioned above    PHYSICAL EXAM:      BP (!) 156/73   Pulse 92   Temp 97.7 °F (36.5 °C) (Oral)   Resp 18   Ht 1.778 m (5' 10\")   Wt 72 kg (158 lb 11.7 oz)   SpO2          IMPRESSION:   Recurrent refractory ITP  Mediastinal lymphadenopathy  Retroperitoneal lymphadenopathy    RECOMMENDATIONS:  I reviewed the labs/imaging available to me,outside records and discussed with the patient.I explained to the patient the nature of this problem. I explained the significance of these abnormalities and possible etiology and management options   74-year-old man with recent diagnosis of ITP 10 months ago which progress on steroid IVIG rituximab and most recently on escalated dose of avatrombopag , at this time patient had no bleeding; discussed with the patient natural history of ITP in general and his case in particular when he have new onset ITP which looks resistant/refractory to most common treatments and I am concerned about secondary ITP/paraneoplastic in the setting of lymphadenopathy especially the autoimmune phenomena can proceed the clonality> workup for malignancy may need repeated including scan/bone marrow biopsy and myeloid malignancy mutation  Flow cytometry  No platelet transfusion unless bleeding  Discontinue TPO and consider spleen tyrosine kinase inhibitor  Will give a second dose of IVIG and follow-up with hematology next week      Discussed with patient and Nurse.      Thank you for asking us to see this patient.                                    Gautam Jones MD                          [facility]                            This note is created with the assistance of a speech recognition program.  While intending to generate a document that actually reflects the content of the visit, the document can still have some errors including those of syntax and sound a like substitutions which may escape proof reading.  It such instances, actual meaning can be extrapolated by contextual diversion.     Hematologist/Medical Oncologist

## 2025-04-16 NOTE — PROGRESS NOTES
Physical Therapy  Facility/Department: [address]   Physical Therapy Initial Evaluation    Patient Name: Bruno Morales        MRN: [id number]    : 1950    Date of Service: 2025    Chief Complaint   Patient presents with    Abnormal Lab     Blood work is checked once month- Doptelet twice a day 20mg  Pt had blood work this morning, platelets was 5 today- pt is asymptomatic     Past Medical History:  has a past medical history of Hyperlipidemia, Hypertension, and Thrombocytopenia.  Past Surgical History:  has a past surgical history that includes Tonsillectomy and CT BIOPSY BONE MARROW (7/3/2024).    Discharge Recommendations  Discharge Recommendations: Home with assist PRN  PT Equipment Recommendations  Equipment Needed: Yes  Mobility Devices: Canes  Cane: Straight Cane    Assessment  Body Structures, Functions, Activity Limitations Requiring Skilled Therapeutic Intervention: Decreased functional mobility , Decreased balance, Decreased strength, Decreased endurance, Decreased safe awareness  Assessment: Pt presents with generalized weakness and balance deficits due to admission for Acute ITP; pt with chronic balance deficits. Pt lives with spouse in two story home with 2 LUIS/(B) rails; pt Independent with functional mobility without device. Pt should be able to return home with assistance as needed. Pt would benefit from use of cane at home.  Therapy Prognosis: Good  Decision Making: Medium Complexity  Requires PT Follow-Up: Yes  Activity Tolerance  Activity Tolerance: Patient tolerated evaluation without incident  Safety Devices  Type of Devices: Call light within reach, Chair alarm in place, Nurse notified, Left in chair  Restraints  Restraints Initially in Place: No    AM-PAC  AM-PAC Basic Mobility - Inpatient   How much help is needed turning from your back to your side while in a flat bed without using bedrails?: None  How much help is needed moving from lying on your back to sitting on the side

## 2025-04-16 NOTE — ED NOTES
ED to inpatient nurses report      Chief Complaint:  Chief Complaint   Patient presents with    Abnormal Lab     Blood work is checked once month- Doptelet twice a day 20mg  Pt had blood work this morning, platelets was 5 today- pt is asymptomatic     Present to ED from: Home    MOA:     LOC: alert and orientated to name, place, date  Mobility: Independent  Oxygen Baseline: 97 % Room air    Current needs required: None   Pending ED orders: Immune Glonulin from Main Pharmacy  Present condition: Stable    Why did the patient come to the ED? Abnormal lab, platelet count <6  What is the plan? Monitor  Any procedures or intervention occur? None  Any safety concerns??None  CODE STATUS Prior  Diet No diet orders on file    Mental Status:  Level of Consciousness: Alert (0)    Psych Assessment:      Vital signs   Vitals:    04/15/25 1950 04/15/25 2017 04/15/25 2100 04/15/25 2102   BP: (!) 192/148   (!) 155/95   Pulse:  70 90    Resp: 16  18    Temp: 97.7 °F (36.5 °C)      TempSrc: Oral      SpO2: 97%   97%   Weight:  72.5 kg (159 lb 13.3 oz)     Height:  1.778 m (5' 10\")          Vitals:  Patient Vitals for the past 24 hrs:   BP Temp Temp src Pulse Resp SpO2 Height Weight   04/15/25 2102 (!) 155/95 -- -- -- -- 97 % -- --   04/15/25 2100 -- -- -- 90 18 -- -- --   04/15/25 2017 -- -- -- 70 -- -- 1.778 m (5' 10\") 72.5 kg (159 lb 13.3 oz)   04/15/25 1950 (!) 192/148 97.7 °F (36.5 °C) Oral -- 16 97 % -- --   04/15/25 1946 -- 97.7 °F (36.5 °C) Oral -- -- -- -- --      Visit Vitals  BP (!) 155/95   Pulse 90   Temp 97.7 °F (36.5 °C) (Oral)   Resp 18   Ht 1.778 m (5' 10\")   Wt 72.5 kg (159 lb 13.3 oz)   SpO2 97%   BMI 22.93 kg/m²        LDAs:   Peripheral IV 04/15/25 Right;Anterior Forearm (Active)   Site Assessment Clean, dry & intact 04/15/25 2016   Line Status Blood return noted 04/15/25 2016   Phlebitis Assessment No symptoms 04/15/25 2016   Infiltration Assessment 0 04/15/25 2016       Meds:  Medications   immune globulin

## 2025-04-16 NOTE — PLAN OF CARE
Problem: Discharge Planning  Goal: Discharge to home or other facility with appropriate resources  Outcome: Progressing  Flowsheets  Taken 4/16/2025 1645  Discharge to home or other facility with appropriate resources:   Identify barriers to discharge with patient and caregiver   Arrange for needed discharge resources and transportation as appropriate   Identify discharge learning needs (meds, wound care, etc)   Refer to discharge planning if patient needs post-hospital services based on physician order or complex needs related to functional status, cognitive ability or social support system  Taken 4/16/2025 0730  Discharge to home or other facility with appropriate resources:   Identify barriers to discharge with patient and caregiver   Arrange for needed discharge resources and transportation as appropriate   Identify discharge learning needs (meds, wound care, etc)   Refer to discharge planning if patient needs post-hospital services based on physician order or complex needs related to functional status, cognitive ability or social support system     Problem: Pain  Goal: Verbalizes/displays adequate comfort level or baseline comfort level  Outcome: Progressing  Flowsheets  Taken 4/16/2025 1710  Verbalizes/displays adequate comfort level or baseline comfort level:   Encourage patient to monitor pain and request assistance   Assess pain using appropriate pain scale   Administer analgesics based on type and severity of pain and evaluate response   Implement non-pharmacological measures as appropriate and evaluate response   Notify Licensed Independent Practitioner if interventions unsuccessful or patient reports new pain  Taken 4/16/2025 1705  Verbalizes/displays adequate comfort level or baseline comfort level:   Encourage patient to monitor pain and request assistance   Assess pain using appropriate pain scale   Administer analgesics based on type and severity of pain and evaluate response   Implement

## 2025-04-16 NOTE — PLAN OF CARE
Problem: Discharge Planning  Goal: Discharge to home or other facility with appropriate resources  Outcome: Progressing     Problem: Pain  Goal: Verbalizes/displays adequate comfort level or baseline comfort level  Outcome: Progressing     Problem: ABCDS Injury Assessment  Goal: Absence of physical injury  Outcome: Progressing     Problem: Cardiovascular - Adult  Goal: Maintains optimal cardiac output and hemodynamic stability  Outcome: Progressing     Problem: Skin/Tissue Integrity - Adult  Goal: Skin integrity remains intact  Outcome: Progressing     Problem: Musculoskeletal - Adult  Goal: Return mobility to safest level of function  Outcome: Progressing     Problem: Metabolic/Fluid and Electrolytes - Adult  Goal: Electrolytes maintained within normal limits  Outcome: Progressing  Goal: Hemodynamic stability and optimal renal function maintained  Outcome: Progressing

## 2025-04-16 NOTE — CARE COORDINATION
Case Management Assessment  Initial Evaluation    Date/Time of Evaluation: 4/16/2025 9:27 AM  Assessment Completed by: Lena Thornton    If patient is discharged prior to next notation, then this note serves as note for discharge by case management.    Patient Name: Bruno Morales                   YOB: 1950  Diagnosis: Thrombocytopenia [D69.6]  Acute idiopathic thrombocytopenic purpura (HCC) [D69.3]  Chronic ITP (idiopathic thrombocytopenic purpura) (HCC) [D69.3]                   Date / Time: 4/15/2025  7:40 PM    Patient Admission Status: Inpatient   Readmission Risk (Low < 19, Mod (19-27), High > 27): Readmission Risk Score: 14.9    Current PCP: Margarette Vallejo MD  PCP verified by CM? (P) Yes    Chart Reviewed: Yes      History Provided by: Patient  Patient Orientation: Alert and Oriented    Patient Cognition: Alert    Hospitalization in the last 30 days (Readmission):  No    If yes, Readmission Assessment in CM Navigator will be completed.    Advance Directives:      Code Status: Full Code   Patient's Primary Decision Maker is: (P) Legal Next of Kin    Primary Decision Maker: Kristine Morales - Spouse - [phone]    Secondary Decision Maker: Rob Ireland - Other - [phone]    Discharge Planning:    Patient lives with: (P) Spouse/Significant Other Type of Home: (P) House  Primary Care Giver: Self  Patient Support Systems include: Spouse/Significant Other   Current Financial resources: (P) Medicare  Current community resources:    Current services prior to admission: (P) None            Current DME:              Type of Home Care services:  (P) None    ADLS  Prior functional level: (P) Independent in ADLs/IADLs  Current functional level: (P) Independent in ADLs/IADLs    PT AM-PAC:   /24  OT AM-PAC:   /24    Family can provide assistance at DC: (P) Yes  Would you like Case Management to discuss the discharge plan with any other family members/significant others, and if so, who? (P) No  Plans to

## 2025-04-16 NOTE — H&P
..  [facility]  Office: [phone]  Phani Andino DO, Carlos A Hernandez DO, Sandor Rivera DO, Dre Marroquin DO, Cecelia Murillo MD, Rosie Robbins MD, Castro Hope MD, Becka Sharma MD,  Wolf Reardon MD, Edna Truong MD, Jamee Padilla MD,  Bia Weir DO, Jacob Riley MD, Rk Be MD, Diego Andino DO, Viviana Marroquin MD,  Jesse Mahan DO, Paula Ramsay MD, Alyssia Henley MD, Chari Pastor MD,  Reynold Lennon MD, Luca Eckert MD, Speedy Durham MD, Devyn Moreno MD, Heber Valenzuela MD, David Paiz MD, Eagle Whitt DO, Maile Miranda MD, Sebastian Braswell MD, Bia Griffith MD, Mohsin Reza, MD, Queta Yao MD, Shirley Waterhouse, CNP,  Darlyn Savage, CNP, Eagle Yoon, CNP,  Luzmaria Mitchell, DNP, Tonja Vizcaino, CNP, Tiff Hamilton, CNP, Glenna Taylor, CNP, Lili Cool, CNP, Amina Harrell, PA-C, Letitia Lynch, CNP, Violette Romo, CNP,  Jonna Galvez, CNP, Viviana Ludwig, CNP, Hector Jackson, PA-C, Kelly Minor, CNP,  Lluvia Anand, CNS, Marilynn Acuña, CNP, Ashley Benítez, CNP,   Poppy Hughes, CNP         [facility]   IN-PATIENT SERVICE   [facility]    HISTORY AND PHYSICAL EXAMINATION            Date:   4/15/2025  Patient name:  Bruno Morales  Date of admission:  4/15/2025  7:40 PM  MRN:   [id number]  Account:  [id number]  YOB: 1950  PCP:    Margarette Vallejo MD  Room:   ER12/ER12  Code Status:    Prior    Chief Complaint:     Chief Complaint   Patient presents with    Abnormal Lab     Blood work is checked once month- Doptelet twice a day 20mg  Pt had blood work this morning, platelets was 5 today- pt is asymptomatic       History Obtained From:     patient, electronic medical record    History of Present Illness:     Bruno Morales is a 74 y.o.  /  male who presents with Abnormal Lab (Blood work is checked once month- Doptelet twice a day 20mg/Pt had blood work this morning, platelets was 5  today- pt is asymptomatic)   and is admitted to the hospital for the management of Acute idiopathic thrombocytopenic purpura (HCC).    Patient is an independent 74-year-old male who presents to the emergency department due to abnormal labs that were taken outpatient.  Patient has a history of idiopathic thrombocytopenic purpura and follows with heme-onc.  He has monthly CBC testing.  Today his platelets were 5 and was asked to come to the ED.  He is currently asymptomatic.  Of note his blood pressure was 192/148 in ED. He is compliant with all medications, denies any bleeding in gums, urine, stool, etc.     Past Medical History:     Past Medical History:   Diagnosis Date    Hyperlipidemia     Hypertension     Thrombocytopenia         Past Surgical History:     Past Surgical History:   Procedure Laterality Date    CT BIOPSY BONE MARROW  7/3/2024    CT BONE MARROW BIOPSY 7/3/2024 [facility] CT SCAN    TONSILLECTOMY          Medications Prior to Admission:     Prior to Admission medications    Medication Sig Start Date End Date Taking? Authorizing Provider   b complex vitamins capsule Take 1 capsule by mouth daily    Zoya Murray MD   Nutritional Supplements (ENSURE ACTIVE PO) Take by mouth    Zoya Murray MD   Avatrombopag Maleate 20 MG TABS Take 40 mg by mouth daily 2/21/25   Bia Coon MD   dilTIAZem (CARDIZEM CD) 180 MG extended release capsule  9/4/24   Zoya Murray MD   magnesium oxide (MAG-OX) 400 MG tablet Take 1 tablet by mouth daily 7/5/24   Speedy Durham MD   omeprazole (PRILOSEC) 40 MG delayed release capsule Take 1 capsule by mouth daily 6/22/24   Rosie Robbins MD   dilTIAZem (CARDIZEM) 60 MG tablet Take 1 tablet by mouth every 8 hours 6/22/24   Rosie Robbins MD   lisinopril (PRINIVIL;ZESTRIL) 40 MG tablet Take 1 tablet by mouth daily    Zoya Murray MD   brimonidine (ALPHAGAN P) 0.1 % SOLN Place 1 drop into the left eye 2 times daily    Trevor

## 2025-04-16 NOTE — ED PROVIDER NOTES
[facility] Emergency Department  [address]  [facility] [address]  Phone: [phone]  Fax: [phone]    EMERGENCY DEPARTMENT ENCOUNTER          Pt Name: Bruno Morales  MRN: [id number]  Birthdate 1950  Date of evaluation: 4/15/2025      CHIEF COMPLAINT       Chief Complaint   Patient presents with    Abnormal Lab     Blood work is checked once month- Doptelet twice a day 20mg  Pt had blood work this morning, platelets was 5 today- pt is asymptomatic       HISTORY OF PRESENT ILLNESS    Bruno Morales is a 74 y.o. male with past medical history of thrombocytopenia secondary to idiopathic thrombocytopenic purpura for which he follows with hematology/oncology here at [place].  He has received previous infusions and treatments for his thrombocytopenia which was recently discovered last year around April.  His platelets at that time were discovered to be 4.  He comes in today for evaluation after his platelets are found to be 5 after a monthly CBC for monitoring of his thrombocytopenia.  He currently denies any symptoms at this time, no chest pain, no shortness of breath, no fevers no chills, no nausea, no vomiting, no abdominal pain, no headaches, no vision changes, no hearing changes.    REVIEW OF SYSTEMS       Negative except for as stated above in HPI.  PAST MEDICAL HISTORY    has a past medical history of Hyperlipidemia, Hypertension, and Thrombocytopenia.    SURGICAL HISTORY      has a past surgical history that includes Tonsillectomy and CT BIOPSY BONE MARROW (7/3/2024).    CURRENT MEDICATIONS       Previous Medications    AVATROMBOPAG MALEATE 20 MG TABS    Take 40 mg by mouth daily    B COMPLEX VITAMINS CAPSULE    Take 1 capsule by mouth daily    BRIMONIDINE (ALPHAGAN P) 0.1 % SOLN    Place 1 drop into the left eye 2 times daily    CARVEDILOL (COREG) 12.5 MG TABLET    Take 2 tablets by mouth 2 times daily (with meals)    DILTIAZEM (CARDIZEM CD) 180 MG EXTENDED RELEASE    Musculoskeletal:         General: No swelling, tenderness, deformity or signs of injury.      Right lower leg: No edema.      Left lower leg: No edema.   Skin:     General: Skin is warm.      Capillary Refill: Capillary refill takes less than 2 seconds.      Coloration: Skin is not jaundiced or pale.      Findings: Bruising present. No erythema, lesion or rash.   Neurological:      General: No focal deficit present.      Mental Status: He is alert and oriented to person, place, and time. Mental status is at baseline.       DIFFERENTIAL DIAGNOSIS/ MDM:     At this time the plan will be to repeat CBC to confirm thrombocytopenia.  Will reach out to hematology/oncology for recommendations.  Repeat CBC demonstrates platelet count of 6.  Did reach out to hematology/oncology who recommended admission with IVIG, 1 g/kg.  They will evaluate patient in the AM.    Differential diagnosis considered: Thrombocytopenia secondary to ITP    Chronic Conditions affecting care (DM,HTN,CA, etc): See past medical history.    Social Determinants of Health affecting care (unable to care for self, lives alone, unemployed, homeless,etc): None    History source(s) (patient,spouse,parent,family,friend,EMS,etc): Patient, spouse    Review of external sources (ECF,Hospital records,EMS report, radiology reports, etc): Reviewed    Tests considered but not ordered: Not applicable    Independent interpretation of tests (eg.  X-ray, CAT scan, Doppler studies, EKG): ECG interpreted by myself if completed.    Discussion of x-ray results with radiology: See below    Consults: See below    Consideration for admission/observation (even if discharged): Considered    Prescription considerations: Not applicable    Sepsis considered: Considered but unlikely    Critical Care note written: See below    DIAGNOSTIC RESULTS     EKG: All EKG's are interpreted by the Emergency Department Physician who either signs or Co-signs this chart in the absence of a

## 2025-04-17 LAB
ANION GAP SERPL CALCULATED.3IONS-SCNC: 9 MMOL/L (ref 9–17)
BUN SERPL-MCNC: 31 MG/DL (ref 8–23)
CALCIUM SERPL-MCNC: 8.7 MG/DL (ref 8.6–10.4)
CHLORIDE SERPL-SCNC: 107 MMOL/L (ref 98–107)
CO2 SERPL-SCNC: 22 MMOL/L (ref 20–31)
CREAT SERPL-MCNC: 1.6 MG/DL (ref 0.7–1.2)
ERYTHROCYTE [DISTWIDTH] IN BLOOD BY AUTOMATED COUNT: 14 % (ref 12.5–15.4)
GFR, ESTIMATED: 45 ML/MIN/1.73M2
GLUCOSE SERPL-MCNC: 89 MG/DL (ref 70–99)
HCT VFR BLD AUTO: 33.3 % (ref 41–53)
HGB BLD-MCNC: 11.4 G/DL (ref 13.5–17.5)
MAGNESIUM SERPL-MCNC: 2.1 MG/DL (ref 1.6–2.6)
MCH RBC QN AUTO: 28.5 PG (ref 26–34)
MCHC RBC AUTO-ENTMCNC: 34.2 G/DL (ref 31–37)
MCV RBC AUTO: 83.5 FL (ref 80–100)
MISCELLANEOUS LAB TEST RESULT: NORMAL
PLATELET # BLD AUTO: 124 K/UL (ref 140–450)
PMV BLD AUTO: 10.6 FL (ref 6–12)
POTASSIUM SERPL-SCNC: 3.9 MMOL/L (ref 3.7–5.3)
RBC # BLD AUTO: 4 M/UL (ref 4.5–5.9)
SODIUM SERPL-SCNC: 138 MMOL/L (ref 135–144)
SURGICAL PATHOLOGY REPORT: NORMAL
TEST NAME: NORMAL
WBC OTHER # BLD: 3.7 K/UL (ref 3.5–11)

## 2025-04-17 PROCEDURE — 83735 ASSAY OF MAGNESIUM: CPT

## 2025-04-17 PROCEDURE — 99233 SBSQ HOSP IP/OBS HIGH 50: CPT | Performed by: INTERNAL MEDICINE

## 2025-04-17 PROCEDURE — 2500000003 HC RX 250 WO HCPCS

## 2025-04-17 PROCEDURE — 6370000000 HC RX 637 (ALT 250 FOR IP): Performed by: INTERNAL MEDICINE

## 2025-04-17 PROCEDURE — 99222 1ST HOSP IP/OBS MODERATE 55: CPT | Performed by: INTERNAL MEDICINE

## 2025-04-17 PROCEDURE — 36415 COLL VENOUS BLD VENIPUNCTURE: CPT

## 2025-04-17 PROCEDURE — 85027 COMPLETE CBC AUTOMATED: CPT

## 2025-04-17 PROCEDURE — 2060000000 HC ICU INTERMEDIATE R&B

## 2025-04-17 PROCEDURE — 80048 BASIC METABOLIC PNL TOTAL CA: CPT

## 2025-04-17 PROCEDURE — 99232 SBSQ HOSP IP/OBS MODERATE 35: CPT | Performed by: INTERNAL MEDICINE

## 2025-04-17 PROCEDURE — 6370000000 HC RX 637 (ALT 250 FOR IP)

## 2025-04-17 PROCEDURE — 97535 SELF CARE MNGMENT TRAINING: CPT

## 2025-04-17 RX ORDER — POTASSIUM CHLORIDE 1500 MG/1
20 TABLET, EXTENDED RELEASE ORAL ONCE
Status: DISCONTINUED | OUTPATIENT
Start: 2025-04-17 | End: 2025-04-19

## 2025-04-17 RX ORDER — CARVEDILOL 3.12 MG/1
6.25 TABLET ORAL 2 TIMES DAILY WITH MEALS
Status: DISCONTINUED | OUTPATIENT
Start: 2025-04-17 | End: 2025-04-18

## 2025-04-17 RX ADMIN — BRIMONIDINE TARTRATE 1 DROP: 2 SOLUTION OPHTHALMIC at 20:20

## 2025-04-17 RX ADMIN — SODIUM CHLORIDE, PRESERVATIVE FREE 10 ML: 5 INJECTION INTRAVENOUS at 08:30

## 2025-04-17 RX ADMIN — Medication 400 MG: at 11:40

## 2025-04-17 RX ADMIN — SODIUM CHLORIDE, PRESERVATIVE FREE 10 ML: 5 INJECTION INTRAVENOUS at 20:20

## 2025-04-17 RX ADMIN — FENOFIBRATE 160 MG: 160 TABLET ORAL at 08:30

## 2025-04-17 RX ADMIN — BRIMONIDINE TARTRATE 1 DROP: 2 SOLUTION OPHTHALMIC at 08:30

## 2025-04-17 RX ADMIN — POTASSIUM BICARBONATE 20 MEQ: 782 TABLET, EFFERVESCENT ORAL at 17:54

## 2025-04-17 RX ADMIN — LISINOPRIL 40 MG: 20 TABLET ORAL at 08:30

## 2025-04-17 RX ADMIN — ATORVASTATIN CALCIUM 40 MG: 40 TABLET, FILM COATED ORAL at 20:17

## 2025-04-17 RX ADMIN — CARVEDILOL 6.25 MG: 3.12 TABLET, FILM COATED ORAL at 20:17

## 2025-04-17 RX ADMIN — CETIRIZINE HYDROCHLORIDE 10 MG: 10 TABLET, FILM COATED ORAL at 20:17

## 2025-04-17 NOTE — PROGRESS NOTES
Occupational Therapy  Occupational Therapy Daily Treatment Note  Facility/Department: [address]   Patient Name: Bruno Morales        MRN: [id number]    : 1950    Date of Service: 2025    Chief Complaint   Patient presents with    Abnormal Lab     Blood work is checked once month- Doptelet twice a day 20mg  Pt had blood work this morning, platelets was 5 today- pt is asymptomatic     Past Medical History:  has a past medical history of Hyperlipidemia, Hypertension, and Thrombocytopenia.  Past Surgical History:  has a past surgical history that includes Tonsillectomy and CT BIOPSY BONE MARROW (7/3/2024).    Discharge Recommendations  Discharge Recommendations: Patient would benefit from continued therapy after discharge    Assessment  Performance deficits / Impairments: Decreased functional mobility ;Decreased ADL status;Decreased safe awareness;Decreased cognition;Decreased endurance;Decreased balance;Decreased high-level IADLs  Assessment: Pt presents to OT this date with above noted deficits. Pt is not currently functioning at [facility] but is making progress towards OT goals. At this time Pt does not demonstrate the functional ability to safely return to prior living arrangements. It is recommended that Pt recieve OT services during hospitalization and after discharge to increase safety/ADLs for safe return to previous environment.  Prognosis: Good  Decision Making: Medium Complexity  REQUIRES OT FOLLOW-UP: Yes  Activity Tolerance  Activity Tolerance: Patient Tolerated treatment well  Safety Devices  Type of Devices: Bed alarm in place;Call light within reach;Gait belt;Patient at risk for falls;Left in bed;Nurse notified  Restraints  Restraints Initially in Place: No    AM-PAC  AM-PAC Daily Activity - Inpatient   How much help is needed for putting on and taking off regular lower body clothing?: A Little  How much help is needed for bathing (which includes washing, rinsing, drying)?: A Little  How much

## 2025-04-17 NOTE — PLAN OF CARE
Contacted for cardiology consult- patient follows with Dr. Cormier. Primary notified.     Electronically signed by CRISTHIAN Vazquez - CNP on 4/17/2025 at 10:22 AM  [facility]  [phone]

## 2025-04-17 NOTE — CONSULTS
Cardiology Consultation   Angelito Cormier MD [facility]  Kim Mckeon MD [facility], CNP      Reason for Consult: Marked sinus bradycardia  Requesting Physician: Maile Miranda MD    CHIEF COMPLAINT: Severe thrombocytopenia and marked sinus bradycardia      HISTORY OF PRESENT ILLNESS:    This is a 74-year-old gentleman with history of idiopathic thrombocytopenic purpura, essential hypertension, dyslipidemia, frequent PVCs, lung nodules with mediastinal/hilar lymphadenopathy, CKD with anemia of chronic disease and GERD was admitted with severe thrombocytopenia and was started on IVIG infusion as per hematology.      As per reports he had some marked sinus bradycardia overnight with heart rate dropping into high 30s. During his previous admissions patient had frequent PVCs and was started on carvedilol and diltiazem, his home medications include carvedilol 12.5 mg twice a day, diltiazem CD1 80 mg daily and diltiazem 60 mg 3 times a day.  We have emphasized the importance of checking the bottles at home and not duplicating any of these medications.  We have also requested to keep potassium more than 4 and magnesium more than 2.    EKG has shown sinus rhythm with heart rate 66 bpm with first-degree AV block and PVCs.  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  Patient is on carvedilol 12.5 mg twice a day and diltiazem CD1 20 mg daily, however it looks like he received carvedilol 25 mg yesterday.  We have held diltiazem at this point and have decrease carvedilol to 6.25 mg twice a day and we will optimize the dose as heart rate tolerates.      Past Medical History:    Past Medical History:   Diagnosis Date    Hyperlipidemia     Hypertension     Thrombocytopenia      Past Surgical History:    Past Surgical History:   Procedure Laterality Date    CT BIOPSY BONE MARROW  7/3/2024    CT BONE MARROW BIOPSY 7/3/2024  [facility] CT SCAN    TONSILLECTOMY       Home Medications:  Prior to Admission medications    Medication Sig Start Date End Date Taking? Authorizing Provider   b complex vitamins capsule Take 1 capsule by mouth daily   Yes Zoya Murray MD   Nutritional Supplements (ENSURE ACTIVE PO) Take by mouth   Yes Zoya Murray MD   Avatrombopag Maleate 20 MG TABS Take 40 mg by mouth daily 2/21/25  Yes Bia Coon MD   dilTIAZem (CARDIZEM CD) 180 MG extended release capsule  9/4/24  Yes Zoya Murray MD   magnesium oxide (MAG-OX) 400 MG tablet Take 1 tablet by mouth daily 7/5/24  Yes Speedy Durham MD   omeprazole (PRILOSEC) 40 MG delayed release capsule Take 1 capsule by mouth daily 6/22/24  Yes Rosie Robbins MD   lisinopril (PRINIVIL;ZESTRIL) 40 MG tablet Take 1 tablet by mouth daily   Yes Zoya Murray MD   brimonidine (ALPHAGAN P) 0.1 % SOLN Place 1 drop into the left eye 2 times daily   Yes Zoya Murray MD   fenofibrate 160 MG tablet Take 1 tablet by mouth daily   Yes ProviderZoya MD   carvedilol (COREG) 12.5 MG tablet Take 2 tablets by mouth 2 times daily (with meals)   Yes Zoya Murray MD   simvastatin (ZOCOR) 40 MG tablet Take 1 tablet by mouth nightly   Yes Zoya Murray MD   Loratadine 10 MG CAPS Take by mouth   Yes Zoya Murray MD     Current Medications:    Current Facility-Administered Medications   Medication Dose Route Frequency Provider Last Rate Last Admin    potassium chloride (KLOR-CON M) extended release tablet 20 mEq  20 mEq Oral Once Maile Miranda MD        carvedilol (COREG) tablet 6.25 mg  6.25 mg Oral BID  Kim Mckeon MD        magnesium oxide (MAG-OX) tablet 400 mg  400 mg Oral Daily Jonna Galvez APRN - CNP   400 mg at 04/17/25 1140    folic acid-pyridoxine-cyancobalamin 1.13-25-2 tablet (FOLTX) 1.13-25-2 MG TABS 1 tablet (Patient Supplied)  1 tablet Oral Daily Jonna Galvez APRN - CNP

## 2025-04-17 NOTE — PROGRESS NOTES
Today's Date: 4/17/2025  Patient Name: Bruno Morales  Date of admission: 4/15/2025  7:40 PM  Patient's age: 74 y.o., 1950  Admission Dx: Thrombocytopenia [D69.6]  Acute idiopathic thrombocytopenic purpura (HCC) [D69.3]  Chronic ITP (idiopathic thrombocytopenic purpura) (HCC) [D69.3]    Reason for Consult: Recurrent resistant ITP  Requesting Physician: Maile Miranda MD    CHIEF COMPLAINT: Severe thrombocytopenia    Interim history  Patient is seen and evaluated.  Received IVIG with excellent response, platelets are above 100 K.  No bleeding or bruising.  Energy improved and dizziness is lessening.    HISTORY OF PRESENT ILLNESS:      The patient is a 74 y.o.  male who is admitted to the hospital for severe thrombocytopenia at 5, patient had history of ITP with no response initially to steroid and short response to IVIG back in June 2024 and recurred on rituximab quickly and currently on avatrombopag started on September 2024 with maximizing dose on February presented with platelet of 5 but no bleed,Also CT scan showed multiple and lymphadenopathy concerning for lymphomatous masses, especially in mediastinum and retroperitoneum   bone marrow bx showed no primary BM disorder     Past Medical History:   has a past medical history of Hyperlipidemia, Hypertension, and Thrombocytopenia.    Past Surgical History:   has a past surgical history that includes Tonsillectomy and CT BIOPSY BONE MARROW (7/3/2024).     Medications:    Reviewed in Epic     Allergies:  Asa [aspirin], Fish-derived products, Penicillins, and Tetracyclines & related    Social History:   reports that he has never smoked. He has never used smokeless tobacco. He reports that he does not drink alcohol and does not use drugs.     Family History: family history includes Dementia in his mother; Parkinson's Disease in his father.    REVIEW OF SYSTEMS:    14 point review of system has been obtained unremarkable although it was mentioned  above    PHYSICAL EXAM:      BP (!) 141/92   Pulse 62   Temp 97.3 °F (36.3 °C) (Oral)   Resp 18   Ht 1.778 m (5' 10\")   Wt 72 kg (158 lb 11.7 oz)   SpO2 96%   BMI 22.78 kg/m²    Temp (24hrs), Av.1 °F (36.7 °C), Min:97.3 °F (36.3 °C), Max:98.6 °F (37 °C)    General appearance - well appearing, no in pain or distress   Mental status - alert and cooperative   Eyes - pupils equal and reactive, extraocular eye movements intact   Ears - bilateral TM's and external ear canals normal   Mouth - mucous membranes moist, pharynx normal without lesions   Neck - supple, no significant adenopathy   Lymphatics - no palpable lymphadenopathy, no hepatosplenomegaly   Chest - clear to auscultation, no wheezes, rales or rhonchi, symmetric air entry   Heart - normal rate, regular rhythm, normal S1, S2, no murmurs  Abdomen - soft, nontender, nondistended, no masses or organomegaly   Neurological - alert, oriented, normal speech, no focal findings or movement disorder noted   Musculoskeletal - no joint tenderness, deformity or swelling   Extremities - peripheral pulses normal, no pedal edema, no clubbing or cyanosis   Skin - normal coloration and turgor, no rashes, no suspicious skin lesions noted ,    DATA:    Labs:   CBC:   Recent Labs     25  0612 25  0754   WBC 4.2 3.7   HGB 11.5* 11.4*   HCT 33.9* 33.3*   PLT 20* 124*     BMP:   Recent Labs     25  0612 25  0754    138   K 3.7 3.9   CO2 21 22   BUN 33* 31*   CREATININE 1.4* 1.6*   LABGLOM 52* 45*   GLUCOSE 92 89     PT/INR: No results for input(s): \"PROTIME\", \"INR\" in the last 72 hours.    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

## 2025-04-17 NOTE — PROGRESS NOTES
[facility]  Office: [phone]  Phani Andino DO, Carlos A Hernandez DO, Sandor Rivera DO, Dre Marroquin DO, Cecelia Murillo MD, Rosie Robbins MD, Castro Hope MD, Becka Sharma MD,  Wolf Reardon MD, Edna Truong MD, Jamee Padilla MD,  Bia Weir DO, Jacob Riley MD, Rk Be MD, Diego Andino DO, Viviana Marroquin MD,  Jesse Mahan DO, Paula Ramsay MD, Alyssia Henley MD, Chari Pastor MD,  Reynold Lennon MD, Luca Eckert MD, Speedy Durham MD, Devyn Moreno MD, Heber Valenzuela MD, David Paiz MD, Eagle Whitt DO, Maile Miranda MD, Sebastian Braswell MD, Bia Griffith MD, Mohsin Reza, MD, Queta Yao MD, Shirley Waterhouse, CNP,  Darlyn Savage, CNP, Eagle Yoon, CNP,  Luzmaria Mitchell, DNP, Tonja Vizcaino, CNP, Tiff Hamilton, CNP, Glenna Taylor, CNP, Lili Cool, CNP, Amina Harrell, PA-C, Letitia Lynch, CNP, Violette Romo, CNP,  Jonna Galvez, CNP, Viviana Ludwig, CNP, Hector Jackson, PA-C, Kelly Minor, CNP,  Lluvia Anand, CNS, Marilynn Acuña, CNP, Ashley Benítez, CNP,   Poppy Hughes, CNP              [facility]   IN-PATIENT SERVICE   [facility]     4/17/2025    4:31 PM    Name:   Bruno Morales  MRN:     [id number]     Acct:      [id number]   Room:   332/332-01   Day:  2  Admit Date:  4/15/2025  7:40 PM    PCP:   Margarette Vallejo MD  Code Status:  Full Code    Subjective:     C/C:   Chief Complaint   Patient presents with    Abnormal Lab     Blood work is checked once month- Doptelet twice a day 20mg  Pt had blood work this morning, platelets was 5 today- pt is asymptomatic         Interval History Status:     Patient seen to be sitting up in bed, denies any symptoms of bleeding.  Patient vitals, labs and all providers notes were reviewed,from overnight shift and morning updates were noted and discussed with the nurse       Brief history     Bruno Morales is a 74 y.o.  /  male who presents with

## 2025-04-17 NOTE — PLAN OF CARE
Problem: Discharge Planning  Goal: Discharge to home or other facility with appropriate resources  4/17/2025 0011 by Lucille Juarez RN  Outcome: Progressing  4/16/2025 1715 by Mariangel Hernandes RN  Outcome: Progressing  Flowsheets  Taken 4/16/2025 1645  Discharge to home or other facility with appropriate resources:   Identify barriers to discharge with patient and caregiver   Arrange for needed discharge resources and transportation as appropriate   Identify discharge learning needs (meds, wound care, etc)   Refer to discharge planning if patient needs post-hospital services based on physician order or complex needs related to functional status, cognitive ability or social support system  Taken 4/16/2025 0730  Discharge to home or other facility with appropriate resources:   Identify barriers to discharge with patient and caregiver   Arrange for needed discharge resources and transportation as appropriate   Identify discharge learning needs (meds, wound care, etc)   Refer to discharge planning if patient needs post-hospital services based on physician order or complex needs related to functional status, cognitive ability or social support system     Problem: Pain  Goal: Verbalizes/displays adequate comfort level or baseline comfort level  4/17/2025 0011 by Lucille Juarez RN  Outcome: Progressing  4/16/2025 1715 by Mariangel Hernandes RN  Outcome: Progressing  Flowsheets  Taken 4/16/2025 1715  Verbalizes/displays adequate comfort level or baseline comfort level:   Encourage patient to monitor pain and request assistance   Assess pain using appropriate pain scale   Administer analgesics based on type and severity of pain and evaluate response   Implement non-pharmacological measures as appropriate and evaluate response   Notify Licensed Independent Practitioner if interventions unsuccessful or patient reports new pain  Taken 4/16/2025 1710  Verbalizes/displays adequate comfort level or baseline comfort level:   Encourage

## 2025-04-17 NOTE — PLAN OF CARE
Problem: Discharge Planning  Goal: Discharge to home or other facility with appropriate resources  Outcome: Progressing  Flowsheets  Taken 4/17/2025 1700  Discharge to home or other facility with appropriate resources:   Identify barriers to discharge with patient and caregiver   Arrange for needed discharge resources and transportation as appropriate   Identify discharge learning needs (meds, wound care, etc)   Refer to discharge planning if patient needs post-hospital services based on physician order or complex needs related to functional status, cognitive ability or social support system  Taken 4/17/2025 1149  Discharge to home or other facility with appropriate resources:   Identify barriers to discharge with patient and caregiver   Arrange for needed discharge resources and transportation as appropriate   Identify discharge learning needs (meds, wound care, etc)   Refer to discharge planning if patient needs post-hospital services based on physician order or complex needs related to functional status, cognitive ability or social support system  Taken 4/17/2025 0809  Discharge to home or other facility with appropriate resources:   Identify barriers to discharge with patient and caregiver   Arrange for needed discharge resources and transportation as appropriate   Identify discharge learning needs (meds, wound care, etc)   Refer to discharge planning if patient needs post-hospital services based on physician order or complex needs related to functional status, cognitive ability or social support system     Problem: Pain  Goal: Verbalizes/displays adequate comfort level or baseline comfort level  Outcome: Progressing  Flowsheets  Taken 4/17/2025 1700  Verbalizes/displays adequate comfort level or baseline comfort level:   Encourage patient to monitor pain and request assistance   Administer analgesics based on type and severity of pain and evaluate response   Assess pain using appropriate pain scale    Practitioner for values outside of normal range     Problem: Skin/Tissue Integrity - Adult  Goal: Skin integrity remains intact  Outcome: Progressing  Flowsheets  Taken 4/17/2025 1700  Skin Integrity Remains Intact: Monitor for areas of redness and/or skin breakdown  Taken 4/17/2025 1149  Skin Integrity Remains Intact: Monitor for areas of redness and/or skin breakdown  Taken 4/17/2025 0809  Skin Integrity Remains Intact: Monitor for areas of redness and/or skin breakdown     Problem: Musculoskeletal - Adult  Goal: Return mobility to safest level of function  Outcome: Progressing  Flowsheets  Taken 4/17/2025 1700  Return Mobility to Safest Level of Function: Assess patient stability and activity tolerance for standing, transferring and ambulating with or without assistive devices  Taken 4/17/2025 1149  Return Mobility to Safest Level of Function:   Assess patient stability and activity tolerance for standing, transferring and ambulating with or without assistive devices   Assist with transfers and ambulation using safe patient handling equipment as needed  Taken 4/17/2025 0809  Return Mobility to Safest Level of Function:   Assess patient stability and activity tolerance for standing, transferring and ambulating with or without assistive devices   Assist with transfers and ambulation using safe patient handling equipment as needed     Problem: Metabolic/Fluid and Electrolytes - Adult  Goal: Electrolytes maintained within normal limits  Outcome: Progressing  Flowsheets  Taken 4/17/2025 1700  Electrolytes maintained within normal limits:   Monitor labs and assess patient for signs and symptoms of electrolyte imbalances   Administer electrolyte replacement as ordered  Taken 4/17/2025 1149  Electrolytes maintained within normal limits:   Administer electrolyte replacement as ordered   Monitor labs and assess patient for signs and symptoms of electrolyte imbalances  Taken 4/17/2025 0809  Electrolytes maintained within

## 2025-04-18 ENCOUNTER — APPOINTMENT (OUTPATIENT)
Dept: CT IMAGING | Age: 75
End: 2025-04-18
Payer: MEDICARE

## 2025-04-18 PROBLEM — R59.0 MEDIASTINAL LYMPHADENOPATHY: Status: ACTIVE | Noted: 2025-04-18

## 2025-04-18 PROBLEM — R59.0 ABDOMINAL LYMPHADENOPATHY: Status: ACTIVE | Noted: 2025-04-18

## 2025-04-18 PROBLEM — D69.3 ACUTE IDIOPATHIC THROMBOCYTOPENIC PURPURA (HCC): Status: ACTIVE | Noted: 2025-04-18

## 2025-04-18 LAB
ALBUMIN SERPL-MCNC: 3.4 G/DL (ref 3.5–5.2)
ALBUMIN/GLOB SERPL: 0.7 {RATIO} (ref 1–2.5)
ALP SERPL-CCNC: 41 U/L (ref 40–129)
ALT SERPL-CCNC: 10 U/L (ref 5–41)
ANION GAP SERPL CALCULATED.3IONS-SCNC: 8 MMOL/L (ref 9–17)
AST SERPL-CCNC: 18 U/L
BILIRUB SERPL-MCNC: 0.5 MG/DL (ref 0.3–1.2)
BUN SERPL-MCNC: 32 MG/DL (ref 8–23)
CALCIUM SERPL-MCNC: 8.7 MG/DL (ref 8.6–10.4)
CHLORIDE SERPL-SCNC: 109 MMOL/L (ref 98–107)
CHOLEST SERPL-MCNC: 123 MG/DL (ref 0–199)
CHOLESTEROL/HDL RATIO: 4.6
CO2 SERPL-SCNC: 22 MMOL/L (ref 20–31)
CREAT SERPL-MCNC: 1.6 MG/DL (ref 0.7–1.2)
ERYTHROCYTE [DISTWIDTH] IN BLOOD BY AUTOMATED COUNT: 14 % (ref 12.5–15.4)
FLOW CYTOMETRY BL: NORMAL
GFR, ESTIMATED: 45 ML/MIN/1.73M2
GLUCOSE SERPL-MCNC: 87 MG/DL (ref 70–99)
HCT VFR BLD AUTO: 35.9 % (ref 41–53)
HDLC SERPL-MCNC: 27 MG/DL
HGB BLD-MCNC: 12.1 G/DL (ref 13.5–17.5)
LDLC SERPL CALC-MCNC: 66 MG/DL (ref 0–100)
MAGNESIUM SERPL-MCNC: 2.1 MG/DL (ref 1.6–2.6)
MCH RBC QN AUTO: 28.1 PG (ref 26–34)
MCHC RBC AUTO-ENTMCNC: 33.8 G/DL (ref 31–37)
MCV RBC AUTO: 83.2 FL (ref 80–100)
PLATELET # BLD AUTO: 192 K/UL (ref 140–450)
PMV BLD AUTO: 9.8 FL (ref 6–12)
POTASSIUM SERPL-SCNC: 3.8 MMOL/L (ref 3.7–5.3)
PROT SERPL-MCNC: 8.6 G/DL (ref 6.4–8.3)
RBC # BLD AUTO: 4.31 M/UL (ref 4.5–5.9)
SODIUM SERPL-SCNC: 139 MMOL/L (ref 135–144)
TRIGL SERPL-MCNC: 148 MG/DL
TSH SERPL DL<=0.05 MIU/L-ACNC: 2.8 UIU/ML (ref 0.3–5)
VLDLC SERPL CALC-MCNC: 30 MG/DL (ref 1–30)
WBC OTHER # BLD: 3.8 K/UL (ref 3.5–11)

## 2025-04-18 PROCEDURE — 36415 COLL VENOUS BLD VENIPUNCTURE: CPT

## 2025-04-18 PROCEDURE — 99232 SBSQ HOSP IP/OBS MODERATE 35: CPT | Performed by: INTERNAL MEDICINE

## 2025-04-18 PROCEDURE — 6370000000 HC RX 637 (ALT 250 FOR IP): Performed by: INTERNAL MEDICINE

## 2025-04-18 PROCEDURE — 71260 CT THORAX DX C+: CPT

## 2025-04-18 PROCEDURE — 2060000000 HC ICU INTERMEDIATE R&B

## 2025-04-18 PROCEDURE — 85027 COMPLETE CBC AUTOMATED: CPT

## 2025-04-18 PROCEDURE — 84443 ASSAY THYROID STIM HORMONE: CPT

## 2025-04-18 PROCEDURE — 2500000003 HC RX 250 WO HCPCS

## 2025-04-18 PROCEDURE — 80053 COMPREHEN METABOLIC PANEL: CPT

## 2025-04-18 PROCEDURE — 80061 LIPID PANEL: CPT

## 2025-04-18 PROCEDURE — 6370000000 HC RX 637 (ALT 250 FOR IP)

## 2025-04-18 PROCEDURE — 2500000003 HC RX 250 WO HCPCS: Performed by: INTERNAL MEDICINE

## 2025-04-18 PROCEDURE — 6360000004 HC RX CONTRAST MEDICATION: Performed by: INTERNAL MEDICINE

## 2025-04-18 PROCEDURE — 6360000002 HC RX W HCPCS: Performed by: INTERNAL MEDICINE

## 2025-04-18 PROCEDURE — 83735 ASSAY OF MAGNESIUM: CPT

## 2025-04-18 RX ORDER — ATORVASTATIN CALCIUM 10 MG/1
10 TABLET, FILM COATED ORAL NIGHTLY
Status: DISCONTINUED | OUTPATIENT
Start: 2025-04-18 | End: 2025-04-19 | Stop reason: HOSPADM

## 2025-04-18 RX ORDER — CARVEDILOL 12.5 MG/1
12.5 TABLET ORAL 2 TIMES DAILY WITH MEALS
Status: DISCONTINUED | OUTPATIENT
Start: 2025-04-18 | End: 2025-04-18

## 2025-04-18 RX ORDER — AMIODARONE HYDROCHLORIDE 200 MG/1
400 TABLET ORAL DAILY
Status: DISCONTINUED | OUTPATIENT
Start: 2025-04-18 | End: 2025-04-18

## 2025-04-18 RX ORDER — AMLODIPINE BESYLATE 10 MG/1
10 TABLET ORAL DAILY
Status: DISCONTINUED | OUTPATIENT
Start: 2025-04-18 | End: 2025-04-18

## 2025-04-18 RX ORDER — HYDRALAZINE HYDROCHLORIDE 20 MG/ML
10 INJECTION INTRAMUSCULAR; INTRAVENOUS EVERY 6 HOURS PRN
Status: DISCONTINUED | OUTPATIENT
Start: 2025-04-18 | End: 2025-04-19 | Stop reason: HOSPADM

## 2025-04-18 RX ORDER — 0.9 % SODIUM CHLORIDE 0.9 %
80 INTRAVENOUS SOLUTION INTRAVENOUS ONCE
Status: DISCONTINUED | OUTPATIENT
Start: 2025-04-18 | End: 2025-04-19 | Stop reason: HOSPADM

## 2025-04-18 RX ORDER — AMIODARONE HYDROCHLORIDE 200 MG/1
400 TABLET ORAL 2 TIMES DAILY
Status: DISCONTINUED | OUTPATIENT
Start: 2025-04-18 | End: 2025-04-19

## 2025-04-18 RX ORDER — SODIUM CHLORIDE 0.9 % (FLUSH) 0.9 %
10 SYRINGE (ML) INJECTION PRN
Status: DISCONTINUED | OUTPATIENT
Start: 2025-04-18 | End: 2025-04-19 | Stop reason: HOSPADM

## 2025-04-18 RX ORDER — CARVEDILOL 12.5 MG/1
12.5 TABLET ORAL 2 TIMES DAILY WITH MEALS
Status: DISCONTINUED | OUTPATIENT
Start: 2025-04-18 | End: 2025-04-19 | Stop reason: HOSPADM

## 2025-04-18 RX ORDER — CARVEDILOL 6.25 MG/1
6.25 TABLET ORAL 2 TIMES DAILY WITH MEALS
Qty: 60 TABLET | Refills: 3 | Status: SHIPPED | OUTPATIENT
Start: 2025-04-18 | End: 2025-04-19 | Stop reason: HOSPADM

## 2025-04-18 RX ORDER — IOPAMIDOL 755 MG/ML
100 INJECTION, SOLUTION INTRAVASCULAR
Status: COMPLETED | OUTPATIENT
Start: 2025-04-18 | End: 2025-04-18

## 2025-04-18 RX ORDER — POTASSIUM CHLORIDE 1500 MG/1
20 TABLET, EXTENDED RELEASE ORAL ONCE
Status: DISCONTINUED | OUTPATIENT
Start: 2025-04-18 | End: 2025-04-19

## 2025-04-18 RX ADMIN — BRIMONIDINE TARTRATE 1 DROP: 2 SOLUTION OPHTHALMIC at 09:43

## 2025-04-18 RX ADMIN — CETIRIZINE HYDROCHLORIDE 10 MG: 10 TABLET, FILM COATED ORAL at 20:03

## 2025-04-18 RX ADMIN — LISINOPRIL 40 MG: 20 TABLET ORAL at 09:44

## 2025-04-18 RX ADMIN — BRIMONIDINE TARTRATE 1 DROP: 2 SOLUTION OPHTHALMIC at 20:03

## 2025-04-18 RX ADMIN — IOPAMIDOL 100 ML: 755 INJECTION, SOLUTION INTRAVENOUS at 15:08

## 2025-04-18 RX ADMIN — BARIUM SULFATE 450 ML: 20 SUSPENSION ORAL at 15:06

## 2025-04-18 RX ADMIN — ATORVASTATIN CALCIUM 10 MG: 10 TABLET, FILM COATED ORAL at 20:03

## 2025-04-18 RX ADMIN — AMIODARONE HYDROCHLORIDE 400 MG: 200 TABLET ORAL at 20:03

## 2025-04-18 RX ADMIN — SODIUM CHLORIDE, PRESERVATIVE FREE 10 ML: 5 INJECTION INTRAVENOUS at 20:03

## 2025-04-18 RX ADMIN — AMIODARONE HYDROCHLORIDE 400 MG: 200 TABLET ORAL at 09:44

## 2025-04-18 RX ADMIN — Medication 400 MG: at 09:44

## 2025-04-18 RX ADMIN — PANTOPRAZOLE SODIUM 40 MG: 40 TABLET, DELAYED RELEASE ORAL at 05:13

## 2025-04-18 RX ADMIN — POTASSIUM BICARBONATE 20 MEQ: 782 TABLET, EFFERVESCENT ORAL at 09:52

## 2025-04-18 RX ADMIN — HYDRALAZINE HYDROCHLORIDE 10 MG: 20 INJECTION INTRAMUSCULAR; INTRAVENOUS at 21:35

## 2025-04-18 RX ADMIN — Medication 80 ML: at 15:13

## 2025-04-18 RX ADMIN — CARVEDILOL 12.5 MG: 12.5 TABLET, FILM COATED ORAL at 16:39

## 2025-04-18 RX ADMIN — FENOFIBRATE 160 MG: 160 TABLET ORAL at 09:44

## 2025-04-18 RX ADMIN — SODIUM CHLORIDE, PRESERVATIVE FREE 10 ML: 5 INJECTION INTRAVENOUS at 15:08

## 2025-04-18 NOTE — PROGRESS NOTES
Patient had discharged order. Writer noted med discrepancy while completing discharge paperwork. Perfect serve sent to Dr. Mckeon for clarification. Dr. Mckeon stated patient was not ready for discharge. Dr. Miranda notified. Discharge held and Dr. Miranda updated the patient.

## 2025-04-18 NOTE — PROGRESS NOTES
Occupational Therapy    [facility]  Occupational Therapy Not Seen Note    DATE: 2025    NAME: Bruno Morales  MRN: [id number]   : 1950      Patient not seen this date for Occupational Therapy due to:      Other: Pt off the floor at CT       Next Scheduled Treatment: Will check back 2025 as able     Electronically signed by Sienna Brandt OT on 2025 at 3:12 PM

## 2025-04-18 NOTE — PROGRESS NOTES
Cardiology progress note        Date:  4/18/2025  Patient: Bruno Morales  Admission:  4/15/2025  7:40 PM  Admit DX: Thrombocytopenia [D69.6]  Acute idiopathic thrombocytopenic purpura (HCC) [D69.3]  Chronic ITP (idiopathic thrombocytopenic purpura) (HCC) [D69.3]  Age:  74 y.o., 1950     LOS: 3 days     Reason for evaluation:   ITP, frequent PVCs and uncontrolled hypertension      SUBJECTIVE:     The patient was seen and examined. Notes and labs reviewed.    Patient continues having frequent PVCs at times in [facility] and we have increased carvedilol to 12.5 mg twice a day and have started amiodarone to suppress the PVCs, but unfortunately nurse did not give carvedilol and and states that the order did not crossover, now his blood pressure is elevated and I have requested her to give carvedilol     OBJECTIVE:      EXAM:   Vitals:    VITALS:  BP (!) 179/86   Pulse 63   Temp 98.6 °F (37 °C) (Oral)   Resp 18   Ht 1.778 m (5' 10\")   Wt 72 kg (158 lb 11.7 oz)   SpO2 92%   BMI 22.78 kg/m²    24HR INTAKE/OUTPUT:    Intake/Output Summary (Last 24 hours) at 4/18/2025 1941  Last data filed at 4/17/2025 2024  Gross per 24 hour   Intake 200 ml   Output 100 ml   Net 100 ml       General appearance: awake, alert, no apparent discomfort  HEENT: atraumatic, normocephalic, no JVD, no significant carotid bruit  Lungs: clear to auscultation bilaterally  Heart: regular rate and rhythm, S1, S2, 2/6 systolic murmur  Abdomen: soft, non-tender; bowel sounds active  Extremities: no significant lower extremity edema  Neurologic: no obvious neurologic deficit    Current Inpatient Medications:   potassium chloride  20 mEq Oral Once    amiodarone  400 mg Oral BID    sodium chloride  80 mL IntraVENous Once    atorvastatin  10 mg Oral Nightly    carvedilol  12.5 mg Oral BID WC    potassium chloride  20 mEq Oral Once    magnesium oxide  400 mg Oral Daily    folic acid-pyridoxine-cyancobalamin 1.13-25-2 tablet  1 tablet  Oral Daily    fenofibrate  160 mg Oral Daily    brimonidine  1 drop Both Eyes BID    pantoprazole  40 mg Oral QAM AC    sodium chloride flush  5-40 mL IntraVENous 2 times per day    melatonin  3 mg Oral Nightly    lisinopril  40 mg Oral Daily    cetirizine  10 mg Oral Nightly    Avatrombopag Maleate  40 mg Oral Daily       IV Infusions (if any):   sodium chloride         Diagnostics:   Telemetry: Sinus rhythm with frequent PVCs    EKG 4/17/2025  Sinus rhythm 63 bpm with first-degree AV block and PVCs  EKG 4/17/2025  Sinus rhythm 66 bpm with first-degree AV block and PVCs     Echocardiogram 6/17/2024    Normal left ventricular systolic function with EF 55 - 60%. Abnormal diastolic function.    Right ventricle is mildly dilated.    Mild to moderate mitral regurgitation.    Mild to moderate tricuspid regurgitation. Mildly elevated RVSP 42 mmHg.    Left atrium is moderately/severely dilated.     Trivial-Small (<1 cm) localized pericardial effusion. No indication of cardiac tamponade.    Labs:   CBC:  Recent Labs     04/17/25 0754 04/18/25  0537   WBC 3.7 3.8   HGB 11.4* 12.1*   HCT 33.3* 35.9*   * 192     Magnesium:  Recent Labs     04/17/25 0754 04/18/25  0537   MG 2.1 2.1     BMP:  Recent Labs     04/17/25 0754 04/18/25  0537    139   K 3.9 3.8   CALCIUM 8.7 8.7   CO2 22 22   BUN 31* 32*   CREATININE 1.6* 1.6*   LABGLOM 45* 45*   GLUCOSE 89 87     BNP:No results for input(s): \"BNP\", \"PROBNP\" in the last 72 hours.  PT/INR:No results for input(s): \"PROTIME\", \"INR\" in the last 72 hours.  APTT:No results for input(s): \"APTT\" in the last 72 hours.  CARDIAC ENZYMES:No results for input(s): \"MYOGLOBIN\", \"CKTOTAL\", \"CKMB\", \"CKMBINDEX\", \"TROPHS\", \"TROPONINT\" in the last 72 hours.  FASTING LIPID PANEL:      Component Value Date/Time    CHOL 123 04/18/2025 0537    HDL 27 (L) 04/18/2025 0537    LDL 66 04/18/2025 0537    TRIG 148 04/18/2025 0537     LIVER PROFILE:  Recent Labs     04/18/25  0537   AST 18   ALT 10

## 2025-04-18 NOTE — PROGRESS NOTES
Physical Therapy        Physical Therapy Cancel Note      DATE: 2025    NAME: Bruno Morales  MRN: [id number]   : 1950      Patient not seen this date for Physical Therapy due to:    Other: Pt off the floor at CT. Continue to pursue PT treatment as able.       Electronically signed by Lyubov Aggarwal PT on 2025 at 3:13 PM

## 2025-04-18 NOTE — CARE COORDINATION
Patient planning to discharge today, given second IMM.  Patient denies any discharge needs at this time and his wife will be transporting him home.

## 2025-04-18 NOTE — PROGRESS NOTES
Today's Date: 4/18/2025  Patient Name: Bruno Morales  Date of admission: 4/15/2025  7:40 PM  Patient's age: 74 y.o., 1950  Admission Dx: Thrombocytopenia [D69.6]  Acute idiopathic thrombocytopenic purpura (HCC) [D69.3]  Chronic ITP (idiopathic thrombocytopenic purpura) (HCC) [D69.3]    Reason for Consult: Recurrent resistant ITP  Requesting Physician: Maile Miranda MD    CHIEF COMPLAINT: Severe thrombocytopenia    Interim history  Patient is seen and evaluated.  Received IVIG with excellent response, platelets are above 100 K.  No bleeding or bruising.  Energy improved and dizziness is lessening.    HISTORY OF PRESENT ILLNESS:      The patient is a 74 y.o.  male who is admitted to the hospital for severe thrombocytopenia at 5, patient had history of ITP with no response initially to steroid and short response to IVIG back in June 2024 and recurred on rituximab quickly and currently on avatrombopag started on September 2024 with maximizing dose on February presented with platelet of 5 but no bleed,Also CT scan showed multiple and lymphadenopathy concerning for lymphomatous masses, especially in mediastinum and retroperitoneum   bone marrow bx showed no primary BM disorder     Past Medical History:   has a past medical history of Hyperlipidemia, Hypertension, and Thrombocytopenia.    Past Surgical History:   has a past surgical history that includes Tonsillectomy and CT BIOPSY BONE MARROW (7/3/2024).     Medications:    Reviewed in Epic     Allergies:  Asa [aspirin], Fish-derived products, Penicillins, and Tetracyclines & related    Social History:   reports that he has never smoked. He has never used smokeless tobacco. He reports that he does not drink alcohol and does not use drugs.     Family History: family history includes Dementia in his mother; Parkinson's Disease in his father.    REVIEW OF SYSTEMS:    14 point review of system has been obtained unremarkable although it was mentioned

## 2025-04-18 NOTE — PROGRESS NOTES
Brief history     Bruno Morales is a 74 y.o.  /  male who presents with Abnormal Lab (Blood work is checked once month- Doptelet twice a day 20mg/Pt had blood work this morning, platelets was 5 today- pt is asymptomatic)   and is admitted to the hospital for the management of Acute idiopathic thrombocytopenic purpura (HCC).     Patient is an independent 74-year-old male who presents to the emergency department due to abnormal labs that were taken outpatient.  Patient has a history of idiopathic thrombocytopenic purpura and follows with heme-onc.  He has monthly CBC testing.  Today his platelets were 5 and was asked to come to the ED.  He is currently asymptomatic.  Of note his blood pressure was 192/148 in ED. He is compliant with all medications, denies any bleeding in gums, urine, stool, etc    Review of Systems:     Denies any shortness of breath or cough  Denies chest pain or palpitations  Denies abdominal pain, diarrhea vomiting  Denies any new numbness tremors or weakness.      Medications:     Allergies:    Allergies   Allergen Reactions    Asa [Aspirin]     Fish-Derived Products     Penicillins     Tetracyclines & Related        Current Meds:   Scheduled Meds:    potassium chloride  20 mEq Oral Once    carvedilol  6.25 mg Oral BID WC    magnesium oxide  400 mg Oral Daily    folic acid-pyridoxine-cyancobalamin 1.13-25-2 tablet  1 tablet Oral Daily    fenofibrate  160 mg Oral Daily    brimonidine  1 drop Both Eyes BID    pantoprazole  40 mg Oral QAM AC    sodium chloride flush  5-40 mL IntraVENous 2 times per day    melatonin  3 mg Oral Nightly    lisinopril  40 mg Oral Daily    atorvastatin  40 mg Oral Nightly    cetirizine  10 mg Oral Nightly    Avatrombopag Maleate  40 mg Oral Daily    [Held by provider] dilTIAZem  120 mg Oral Daily     Continuous Infusions:    sodium chloride       PRN Meds: diphenhydrAMINE, sodium chloride flush, sodium chloride, potassium chloride **OR** potassium  intact.  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] 07/05/2024     Priority: High    Hyperlipidemia [E78.5]     Anemia, normocytic normochromic [D64.9] 07/02/2024    H/O: stroke [Z86.73] 06/18/2024    Chronic ITP (idiopathic thrombocytopenic purpura) ([facility]) [D69.3] 06/15/2024    CKD stage 3b, GFR 30-44 ml/min ([facility]) [N18.32] 06/15/2024    Primary hypertension [I10] 05/16/2016    Pure hypercholesterolemia [E78.00] 05/16/2016           Plan:        74-year-old male history of chronic idiopathic thrombocytopenic purpura follows with heme-onc, HTN HLD CKD stage IIIa, presenting with severe thrombocytopenia platelets of 5 without any symptoms noted on outpatient labs hence sent to emergency room.      Severe thrombocytopenic purpura-hematology consulted, one dose IVIG given  with improvement in platelets, further dosing per hematology  CKD stage IIIa-creatinine at baseline 1.4 today.  Hypertension-currently controlled-resume home medication  Hyperlipidemia-resume home dose statin    Dispo:-Home    4/18  Patient reports feeling well, per cardiology recommendations amiodarone has been added to his medication, Cardizem held.  Coreg resumed at lower dose.   Will discharge once cleared by cardiology    Maile Miranda MD  4/18/2025  7:52 AM

## 2025-04-19 VITALS
BODY MASS INDEX: 22.72 KG/M2 | TEMPERATURE: 97.3 F | OXYGEN SATURATION: 96 % | DIASTOLIC BLOOD PRESSURE: 84 MMHG | HEIGHT: 70 IN | SYSTOLIC BLOOD PRESSURE: 168 MMHG | HEART RATE: 64 BPM | RESPIRATION RATE: 18 BRPM | WEIGHT: 158.73 LBS

## 2025-04-19 PROCEDURE — 99232 SBSQ HOSP IP/OBS MODERATE 35: CPT | Performed by: INTERNAL MEDICINE

## 2025-04-19 PROCEDURE — 6370000000 HC RX 637 (ALT 250 FOR IP)

## 2025-04-19 PROCEDURE — 6370000000 HC RX 637 (ALT 250 FOR IP): Performed by: INTERNAL MEDICINE

## 2025-04-19 PROCEDURE — 6360000002 HC RX W HCPCS: Performed by: INTERNAL MEDICINE

## 2025-04-19 PROCEDURE — 2500000003 HC RX 250 WO HCPCS

## 2025-04-19 RX ORDER — AMIODARONE HYDROCHLORIDE 200 MG/1
400 TABLET ORAL DAILY
Status: DISCONTINUED | OUTPATIENT
Start: 2025-04-20 | End: 2025-04-19 | Stop reason: HOSPADM

## 2025-04-19 RX ORDER — AMIODARONE HYDROCHLORIDE 400 MG/1
TABLET ORAL
Qty: 60 TABLET | Refills: 0 | Status: SHIPPED | OUTPATIENT
Start: 2025-04-19 | End: 2025-04-19 | Stop reason: HOSPADM

## 2025-04-19 RX ORDER — AMIODARONE HYDROCHLORIDE 200 MG/1
400 TABLET ORAL ONCE
Status: COMPLETED | OUTPATIENT
Start: 2025-04-19 | End: 2025-04-19

## 2025-04-19 RX ORDER — POTASSIUM CHLORIDE 1500 MG/1
20 TABLET, EXTENDED RELEASE ORAL ONCE
Status: DISCONTINUED | OUTPATIENT
Start: 2025-04-19 | End: 2025-04-19 | Stop reason: HOSPADM

## 2025-04-19 RX ORDER — AMIODARONE HYDROCHLORIDE 400 MG/1
400 TABLET ORAL DAILY
Qty: 30 TABLET | Refills: 2 | Status: SHIPPED | OUTPATIENT
Start: 2025-04-20

## 2025-04-19 RX ORDER — ATORVASTATIN CALCIUM 10 MG/1
10 TABLET, FILM COATED ORAL NIGHTLY
Qty: 90 TABLET | Refills: 3 | Status: SHIPPED | OUTPATIENT
Start: 2025-04-19

## 2025-04-19 RX ORDER — CARVEDILOL 12.5 MG/1
12.5 TABLET ORAL 2 TIMES DAILY WITH MEALS
Qty: 60 TABLET | Refills: 3 | Status: SHIPPED | OUTPATIENT
Start: 2025-04-19

## 2025-04-19 RX ORDER — AMIODARONE HYDROCHLORIDE 400 MG/1
TABLET ORAL
Qty: 30 TABLET | Refills: 0 | Status: SHIPPED | OUTPATIENT
Start: 2025-04-19 | End: 2025-04-19

## 2025-04-19 RX ADMIN — LISINOPRIL 40 MG: 20 TABLET ORAL at 08:58

## 2025-04-19 RX ADMIN — POTASSIUM BICARBONATE 20 MEQ: 782 TABLET, EFFERVESCENT ORAL at 15:13

## 2025-04-19 RX ADMIN — FENOFIBRATE 160 MG: 160 TABLET ORAL at 08:58

## 2025-04-19 RX ADMIN — Medication 400 MG: at 08:59

## 2025-04-19 RX ADMIN — HYDRALAZINE HYDROCHLORIDE 10 MG: 20 INJECTION INTRAMUSCULAR; INTRAVENOUS at 11:07

## 2025-04-19 RX ADMIN — BRIMONIDINE TARTRATE 1 DROP: 2 SOLUTION OPHTHALMIC at 08:59

## 2025-04-19 RX ADMIN — AMIODARONE HYDROCHLORIDE 400 MG: 200 TABLET ORAL at 15:18

## 2025-04-19 RX ADMIN — SODIUM CHLORIDE, PRESERVATIVE FREE 10 ML: 5 INJECTION INTRAVENOUS at 08:58

## 2025-04-19 RX ADMIN — AMIODARONE HYDROCHLORIDE 400 MG: 200 TABLET ORAL at 08:59

## 2025-04-19 RX ADMIN — SODIUM CHLORIDE, PRESERVATIVE FREE 10 ML: 5 INJECTION INTRAVENOUS at 11:07

## 2025-04-19 RX ADMIN — CARVEDILOL 12.5 MG: 12.5 TABLET, FILM COATED ORAL at 08:58

## 2025-04-19 NOTE — PROGRESS NOTES
[facility]  Office: [phone]  Phani Andino DO, Carlos A Hernandez DO, Sandor Rivera DO, Dre Marroquin DO, Cecelia Murillo MD, Rosie Robbins MD, Castro Hope MD, Becka Sharma MD,  Wolf Reardon MD, Edna Truong MD, Jamee Padilla MD,  Bia Weir DO, Jacob Riley MD, Rk Be MD, Diego Andino DO, Viviana Marroquin MD,  Jesse Mahan DO, Paula Ramsay MD, Alyssia Henley MD, Chari Pastor MD,  Reynold Lennon MD, Luca Eckert MD, Speedy Durham MD, Devyn Moreno MD, Heber Valenzuela MD, David Paiz MD, Eagle Whitt DO, Maile Miranda MD, Sebastian Braswell MD, Bia Griffith MD, Mohsin Reza, MD, Queta Yao MD, Shirley Waterhouse, CNP,  Darlyn Savage, CNP, Eagle Yoon, CNP,  Luzmaria Mitchell, DNP, Tonja Vizcaino, CNP, Tiff Hamilton, CNP, Glenna Taylor, CNP, Lili Cool, CNP, Amina Harrell, PA-C, Letitia Lynch, CNP, Violette Romo, CNP,  Jonna Galvez, CNP, Viviana Ludwig, CNP, Hector Jackson, PA-C, Kelly Minor, CNP,  Lluvia Anand, CNS, Marilynn Acuña, CNP, Ashley Benítez, CNP,   Poppy Hughes, CNP              [facility]   IN-PATIENT SERVICE   [facility]     4/19/2025    12:21 PM    Name:   Bruno Morales  MRN:     [id number]     Acct:      [id number]   Room:   332/332-01   Day:  4  Admit Date:  4/15/2025  7:40 PM    PCP:   Margarette Vallejo MD  Code Status:  Full Code    Subjective:     C/C:   Chief Complaint   Patient presents with    Abnormal Lab     Blood work is checked once month- Doptelet twice a day 20mg  Pt had blood work this morning, platelets was 5 today- pt is asymptomatic         Interval History Status:     Patient reports feeling well overall.  No new issues overnight.  Did have some episodes of bradycardia/PVCs/bigeminy overnight, but was not symptomatic through it    Patient vitals, labs and all providers notes were reviewed,from overnight shift and morning updates were noted and discussed with the nurse

## 2025-04-19 NOTE — PROGRESS NOTES
Cardiology progress note        Date:  4/19/2025  Patient: Bruno Morales  Admission:  4/15/2025  7:40 PM  Admit DX: Thrombocytopenia [D69.6]  Acute idiopathic thrombocytopenic purpura (HCC) [D69.3]  Chronic ITP (idiopathic thrombocytopenic purpura) (HCC) [D69.3]  Age:  74 y.o., 1950     LOS: 4 days     Reason for evaluation:   ITP, frequent PVCs and uncontrolled hypertension       SUBJECTIVE:     The patient was seen and examined. Notes and labs reviewed.    Patient is feeling fine and denies any chest pain or shortness of breath    His blood pressure and heart rate are better controlled and he has less PVCs on telemetry with initiation of amiodarone    OBJECTIVE:      EXAM:   Vitals:    VITALS:  BP (!) 168/84   Pulse 64   Temp 97.3 °F (36.3 °C) (Oral)   Resp 18   Ht 1.778 m (5' 10\")   Wt 72 kg (158 lb 11.7 oz)   SpO2 96%   BMI 22.78 kg/m²    24HR INTAKE/OUTPUT:    Intake/Output Summary (Last 24 hours) at 4/19/2025 1635  Last data filed at 4/19/2025 1540  Gross per 24 hour   Intake 1657.93 ml   Output --   Net 1657.93 ml       General appearance: awake, alert, no apparent discomfort  HEENT: atraumatic, normocephalic, no JVD, no significant carotid bruit  Lungs: clear to auscultation bilaterally  Heart: regular rate and rhythm, S1, S2, 2/6 systolic murmur  Abdomen: soft, non-tender; bowel sounds active  Extremities: no significant lower extremity edema  Neurologic: no obvious neurologic deficit    Current Inpatient Medications:   [START ON 4/20/2025] amiodarone  400 mg Oral Daily    potassium chloride  20 mEq Oral Once    sodium chloride  80 mL IntraVENous Once    atorvastatin  10 mg Oral Nightly    carvedilol  12.5 mg Oral BID WC    magnesium oxide  400 mg Oral Daily    folic acid-pyridoxine-cyancobalamin 1.13-25-2 tablet  1 tablet Oral Daily    fenofibrate  160 mg Oral Daily    brimonidine  1 drop Both Eyes BID    pantoprazole  40 mg Oral QAM AC    sodium chloride flush  5-40 mL

## 2025-04-19 NOTE — DISCHARGE SUMMARY
[facility]  Office: [phone]  Phani Andino DO, Carlos A Hernandez DO, Sandor Rivera DO, Dre Marroquin DO, Cecelia Murillo MD, Rosie Robbins MD, Castro Hope MD, Becka Sharma MD,  Wolf Reardon MD, dEna Truong MD, Jamee Padilla MD,  Bia Weir DO, Jacob Riley MD, Rk Be MD, Diego Andino DO, Viviana Marroquin MD,  Jesse Mahan DO, Paula Ramsay MD, Alyssia Henley MD, Chari Pastor MD,  Reynold Lennon MD, Luca Eckert MD, Speedy Durham MD, Devyn Moreno MD, Heber Valenzuela MD, David Paiz MD, Eagle Whitt DO, Maile Miranda MD, Sebastian Braswell MD, Bia Griffith MD, Mohsin Reza, MD, Queta Yao MD, Shirley Waterhouse, CNP,  Darlyn Savage, CNP, Eagle Yoon, CNP,  Luzmaria Mitchell, DNP, Tonja Vizcaino, CNP, Tiff Hamilton, CNP, Glenna Taylor, CNP, Lili Cool, CNP, Amina Harrell, PA-C, Letitia Lynch, CNP, Violette Romo, CNP,  Jonna Galvez, CNP, Viviana Ludwig, CNP, Hector Jackson, PA-C, Kelly Minor, CNP,  Lluvia Anand, CNS, Marilynn Acuña, CNP, Ashley Benítez, CNP,   Poppy Hughes, CNP           Discharge Summary     Patient ID: Bruno Morales  :  1950   MRN: [id number]     ACCOUNT:  [id number]   Patient's PCP: Margarette Vallejo MD  Admit Date: 4/15/2025   Discharge Date: 25   Length of Stay: 4  Code Status:  Full Code  Admitting Physician: Maile Miranda MD  Discharge Physician: Maile Miranda MD     Active Discharge Diagnoses:     Primary Problem  Chronic ITP (idiopathic thrombocytopenic purpura) (HCC)      Hospital Problems  Active Hospital Problems    Diagnosis Date Noted    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

## 2025-04-19 NOTE — DISCHARGE INSTR - DIET

## 2025-04-21 LAB
EKG ATRIAL RATE: 0 BPM
EKG Q-T INTERVAL: 0 MS
EKG QRS DURATION: 0 MS
EKG QTC CALCULATION (BAZETT): 0 MS
EKG R AXIS: 0 DEGREES
EKG T AXIS: 0 DEGREES
EKG VENTRICULAR RATE: 0 BPM

## 2025-04-22 ENCOUNTER — TELEPHONE (OUTPATIENT)
Age: 75
End: 2025-04-22

## 2025-04-24 ENCOUNTER — OFFICE VISIT (OUTPATIENT)
Dept: ONCOLOGY | Age: 75
End: 2025-04-24
Payer: MEDICARE

## 2025-04-24 ENCOUNTER — HOSPITAL ENCOUNTER (OUTPATIENT)
Age: 75
Discharge: HOME | End: 2025-04-24
Payer: MEDICARE

## 2025-04-24 ENCOUNTER — TELEPHONE (OUTPATIENT)
Dept: INFUSION THERAPY | Age: 75
End: 2025-04-24

## 2025-04-24 ENCOUNTER — HOSPITAL ENCOUNTER (OUTPATIENT)
Dept: INFUSION THERAPY | Age: 75
Discharge: HOME | End: 2025-04-24
Payer: MEDICARE

## 2025-04-24 VITALS — SYSTOLIC BLOOD PRESSURE: 180 MMHG | HEART RATE: 51 BPM | DIASTOLIC BLOOD PRESSURE: 103 MMHG

## 2025-04-24 VITALS
RESPIRATION RATE: 18 BRPM | DIASTOLIC BLOOD PRESSURE: 89 MMHG | BODY MASS INDEX: 22.4 KG/M2 | HEART RATE: 60 BPM | SYSTOLIC BLOOD PRESSURE: 166 MMHG | TEMPERATURE: 97.6 F | WEIGHT: 156.1 LBS | OXYGEN SATURATION: 95 %

## 2025-04-24 DIAGNOSIS — D69.3 CHRONIC ITP (IDIOPATHIC THROMBOCYTOPENIC PURPURA) (HCC): Primary | ICD-10-CM

## 2025-04-24 DIAGNOSIS — D69.3 IDIOPATHIC THROMBOCYTOPENIC PURPURA (HCC): Primary | ICD-10-CM

## 2025-04-24 DIAGNOSIS — D69.3 ACUTE IDIOPATHIC THROMBOCYTOPENIC PURPURA (HCC): ICD-10-CM

## 2025-04-24 LAB
BASOPHILS # BLD: 0 K/UL (ref 0–0.2)
BASOPHILS NFR BLD: 0 % (ref 0–2)
EOSINOPHIL # BLD: 0.23 K/UL (ref 0–0.4)
EOSINOPHILS RELATIVE PERCENT: 6 % (ref 1–4)
ERYTHROCYTE [DISTWIDTH] IN BLOOD BY AUTOMATED COUNT: 14.2 % (ref 12.5–15.4)
HCT VFR BLD AUTO: 37.6 % (ref 41–53)
HGB BLD-MCNC: 12.7 G/DL (ref 13.5–17.5)
LYMPHOCYTES NFR BLD: 0.35 K/UL (ref 1–4.8)
LYMPHOCYTES RELATIVE PERCENT: 9 % (ref 24–44)
MCH RBC QN AUTO: 28.3 PG (ref 26–34)
MCHC RBC AUTO-ENTMCNC: 33.6 G/DL (ref 31–37)
MCV RBC AUTO: 84.1 FL (ref 80–100)
MONOCYTES NFR BLD: 0.39 K/UL (ref 0.1–0.8)
MONOCYTES NFR BLD: 10 % (ref 1–7)
MORPHOLOGY: NORMAL
NEUTROPHILS NFR BLD: 75 % (ref 36–66)
NEUTS SEG NFR BLD: 2.93 K/UL (ref 1.8–7.7)
PLATELET # BLD AUTO: 5 K/UL (ref 140–450)
PMV BLD AUTO: 11.9 FL (ref 6–12)
RBC # BLD AUTO: 4.48 M/UL (ref 4.5–5.9)
WBC OTHER # BLD: 3.9 K/UL (ref 3.5–11)

## 2025-04-24 PROCEDURE — 1123F ACP DISCUSS/DSCN MKR DOCD: CPT | Performed by: INTERNAL MEDICINE

## 2025-04-24 PROCEDURE — 3079F DIAST BP 80-89 MM HG: CPT | Performed by: INTERNAL MEDICINE

## 2025-04-24 PROCEDURE — 99214 OFFICE O/P EST MOD 30 MIN: CPT | Performed by: INTERNAL MEDICINE

## 2025-04-24 PROCEDURE — 99211 OFF/OP EST MAY X REQ PHY/QHP: CPT | Performed by: INTERNAL MEDICINE

## 2025-04-24 PROCEDURE — 96365 THER/PROPH/DIAG IV INF INIT: CPT

## 2025-04-24 PROCEDURE — 3077F SYST BP >= 140 MM HG: CPT | Performed by: INTERNAL MEDICINE

## 2025-04-24 PROCEDURE — 6360000002 HC RX W HCPCS: Performed by: INTERNAL MEDICINE

## 2025-04-24 PROCEDURE — 1159F MED LIST DOCD IN RCRD: CPT | Performed by: INTERNAL MEDICINE

## 2025-04-24 PROCEDURE — 96366 THER/PROPH/DIAG IV INF ADDON: CPT

## 2025-04-24 PROCEDURE — 85025 COMPLETE CBC W/AUTO DIFF WBC: CPT

## 2025-04-24 PROCEDURE — 6370000000 HC RX 637 (ALT 250 FOR IP): Performed by: INTERNAL MEDICINE

## 2025-04-24 PROCEDURE — 36415 COLL VENOUS BLD VENIPUNCTURE: CPT

## 2025-04-24 RX ORDER — SODIUM CHLORIDE 9 MG/ML
5-250 INJECTION, SOLUTION INTRAVENOUS PRN
OUTPATIENT
Start: 2025-04-24

## 2025-04-24 RX ORDER — MEPERIDINE HYDROCHLORIDE 50 MG/ML
12.5 INJECTION INTRAMUSCULAR; INTRAVENOUS; SUBCUTANEOUS PRN
OUTPATIENT
Start: 2025-04-24

## 2025-04-24 RX ORDER — ACETAMINOPHEN 325 MG/1
650 TABLET ORAL ONCE
Status: CANCELLED | OUTPATIENT
Start: 2025-04-24 | End: 2025-04-24

## 2025-04-24 RX ORDER — ACETAMINOPHEN 325 MG/1
650 TABLET ORAL
OUTPATIENT
Start: 2025-04-24

## 2025-04-24 RX ORDER — SODIUM CHLORIDE 9 MG/ML
INJECTION, SOLUTION INTRAVENOUS CONTINUOUS
OUTPATIENT
Start: 2025-04-24

## 2025-04-24 RX ORDER — DIPHENHYDRAMINE HCL 25 MG
25 TABLET ORAL ONCE
Status: CANCELLED | OUTPATIENT
Start: 2025-04-24 | End: 2025-04-24

## 2025-04-24 RX ORDER — ALBUTEROL SULFATE 90 UG/1
4 INHALANT RESPIRATORY (INHALATION) PRN
OUTPATIENT
Start: 2025-04-24

## 2025-04-24 RX ORDER — HEPARIN 100 UNIT/ML
500 SYRINGE INTRAVENOUS PRN
OUTPATIENT
Start: 2025-04-24

## 2025-04-24 RX ORDER — SODIUM CHLORIDE 9 MG/ML
5-250 INJECTION, SOLUTION INTRAVENOUS PRN
Status: CANCELLED | OUTPATIENT
Start: 2025-04-24

## 2025-04-24 RX ORDER — ONDANSETRON 2 MG/ML
8 INJECTION INTRAMUSCULAR; INTRAVENOUS
OUTPATIENT
Start: 2025-04-24

## 2025-04-24 RX ORDER — EPINEPHRINE 1 MG/ML
0.3 INJECTION, SOLUTION, CONCENTRATE INTRAVENOUS PRN
OUTPATIENT
Start: 2025-04-24

## 2025-04-24 RX ORDER — HYDROCORTISONE SODIUM SUCCINATE 100 MG/2ML
100 INJECTION INTRAMUSCULAR; INTRAVENOUS
OUTPATIENT
Start: 2025-04-24

## 2025-04-24 RX ORDER — ACETAMINOPHEN 325 MG/1
650 TABLET ORAL ONCE
Status: COMPLETED | OUTPATIENT
Start: 2025-04-24 | End: 2025-04-24

## 2025-04-24 RX ORDER — SODIUM CHLORIDE 9 MG/ML
5-250 INJECTION, SOLUTION INTRAVENOUS PRN
Status: DISCONTINUED | OUTPATIENT
Start: 2025-04-24 | End: 2025-04-25 | Stop reason: HOSPADM

## 2025-04-24 RX ORDER — FAMOTIDINE 10 MG/ML
20 INJECTION, SOLUTION INTRAVENOUS
OUTPATIENT
Start: 2025-04-24

## 2025-04-24 RX ORDER — FOSTAMATINIB 100 MG/1
100 TABLET ORAL 2 TIMES DAILY
Qty: 60 TABLET | Refills: 3 | Status: ACTIVE | OUTPATIENT
Start: 2025-04-24

## 2025-04-24 RX ORDER — SODIUM CHLORIDE 0.9 % (FLUSH) 0.9 %
5-40 SYRINGE (ML) INJECTION PRN
OUTPATIENT
Start: 2025-04-24

## 2025-04-24 RX ORDER — DIPHENHYDRAMINE HCL 25 MG
25 TABLET ORAL ONCE
Status: COMPLETED | OUTPATIENT
Start: 2025-04-24 | End: 2025-04-24

## 2025-04-24 RX ORDER — DIPHENHYDRAMINE HYDROCHLORIDE 50 MG/ML
50 INJECTION, SOLUTION INTRAMUSCULAR; INTRAVENOUS
OUTPATIENT
Start: 2025-04-24

## 2025-04-24 RX ADMIN — DIPHENHYDRAMINE HYDROCHLORIDE 25 MG: 25 TABLET ORAL at 12:35

## 2025-04-24 RX ADMIN — IMMUNE GLOBULIN (HUMAN) 70000 MG: 10 INJECTION INTRAVENOUS; SUBCUTANEOUS at 12:54

## 2025-04-24 RX ADMIN — ACETAMINOPHEN 650 MG: 325 TABLET ORAL at 12:35

## 2025-04-24 NOTE — PROGRESS NOTES
induce response.  Short response to avatrombopag  Receiving IVIG  Plan to transition to fostamatinib    RECOMMENDATIONS:  Will send for urgent IVIG today  Unfortunately, the response to IVIG has been short-lived  We will transition to fostamatinib and asked him to stop taking the avatrombopag  CT scan was reviewed.  Although the lymphadenopathy and the lung nodules have been stable for many months, because of his refractory ITP, we are likely to obtain a biopsy once his platelets are stable to exclude malignancy  Bia Jaime MD  Hematologist/Medical Oncologist  Cell: [phone]

## 2025-04-24 NOTE — PATIENT INSTRUCTIONS
Need STAT CBC  Possible need for IVIG depending on reading.   See orders for tavalisse.   Rv with cbc in 2 weeks     Stat IVIG today

## 2025-04-24 NOTE — PROGRESS NOTES
Patient arrives ambulatory for STAT IVIG   Pt seen by Dr Nguyen  , Orders to proceed with IVIG stat  Labs drawn and reviewed plt 5   IV access obtained without issue and treatment completed   BP elevated - Pt states he has been working with his cardiologist on adjusting his BP meds.    Pt discharged in stable condition  Next appointment 5/8 for MD follow-up

## 2025-04-28 LAB
MISCELLANEOUS LAB TEST RESULT: NORMAL
TEST NAME: NORMAL

## 2025-04-29 ENCOUNTER — OFFICE VISIT (OUTPATIENT)
Age: 75
End: 2025-04-29
Payer: MEDICARE

## 2025-04-29 VITALS
WEIGHT: 154 LBS | OXYGEN SATURATION: 97 % | BODY MASS INDEX: 22.05 KG/M2 | HEART RATE: 65 BPM | SYSTOLIC BLOOD PRESSURE: 188 MMHG | HEIGHT: 70 IN | DIASTOLIC BLOOD PRESSURE: 98 MMHG

## 2025-04-29 DIAGNOSIS — N18.32 CKD STAGE 3B, GFR 30-44 ML/MIN (HCC): ICD-10-CM

## 2025-04-29 DIAGNOSIS — R91.8 LUNG NODULES: ICD-10-CM

## 2025-04-29 DIAGNOSIS — E78.5 DYSLIPIDEMIA: ICD-10-CM

## 2025-04-29 DIAGNOSIS — D64.9 ANEMIA, NORMOCYTIC NORMOCHROMIC: ICD-10-CM

## 2025-04-29 DIAGNOSIS — D69.3 IDIOPATHIC THROMBOCYTOPENIC PURPURA (HCC): ICD-10-CM

## 2025-04-29 DIAGNOSIS — I49.3 PVC (PREMATURE VENTRICULAR CONTRACTION): Primary | ICD-10-CM

## 2025-04-29 DIAGNOSIS — K21.9 GASTROESOPHAGEAL REFLUX DISEASE WITHOUT ESOPHAGITIS: ICD-10-CM

## 2025-04-29 DIAGNOSIS — I10 ESSENTIAL HYPERTENSION: ICD-10-CM

## 2025-04-29 PROCEDURE — 1160F RVW MEDS BY RX/DR IN RCRD: CPT | Performed by: INTERNAL MEDICINE

## 2025-04-29 PROCEDURE — 3077F SYST BP >= 140 MM HG: CPT | Performed by: INTERNAL MEDICINE

## 2025-04-29 PROCEDURE — 1123F ACP DISCUSS/DSCN MKR DOCD: CPT | Performed by: INTERNAL MEDICINE

## 2025-04-29 PROCEDURE — 3080F DIAST BP >= 90 MM HG: CPT | Performed by: INTERNAL MEDICINE

## 2025-04-29 PROCEDURE — 1159F MED LIST DOCD IN RCRD: CPT | Performed by: INTERNAL MEDICINE

## 2025-04-29 PROCEDURE — 99215 OFFICE O/P EST HI 40 MIN: CPT | Performed by: INTERNAL MEDICINE

## 2025-04-29 PROCEDURE — 93000 ELECTROCARDIOGRAM COMPLETE: CPT | Performed by: INTERNAL MEDICINE

## 2025-04-29 RX ORDER — AMIODARONE HYDROCHLORIDE 200 MG/1
200 TABLET ORAL DAILY
Qty: 90 TABLET | Refills: 3 | Status: SHIPPED | OUTPATIENT
Start: 2025-04-29

## 2025-04-29 RX ORDER — AMIODARONE HYDROCHLORIDE 400 MG/1
200 TABLET ORAL DAILY
Qty: 45 TABLET | Refills: 3 | Status: SHIPPED | OUTPATIENT
Start: 2025-04-29 | End: 2025-04-29 | Stop reason: ALTCHOICE

## 2025-04-29 RX ORDER — AMLODIPINE BESYLATE 10 MG/1
10 TABLET ORAL DAILY
Qty: 90 TABLET | Refills: 3 | Status: SHIPPED | OUTPATIENT
Start: 2025-04-29

## 2025-04-29 NOTE — PROGRESS NOTES
Cardiology Office Consultation           CC: Patient is here to establish cardiac care for PVCs    HPI:  This is a 74-year-old gentleman with history of idiopathic thrombocytopenic purpura, essential hypertension, dyslipidemia, frequent PVCs, lung nodules with mediastinal/hilar lymphadenopathy, CKD with anemia of chronic disease and GERD was admitted with severe thrombocytopenia and was treated with IVIG infusion as per hematology.       During the hospitalization he had marked sinus bradycardia at night with heart rate dropping into high 30s. During his previous admissions patient had frequent PVCs and was started on carvedilol and diltiazem, his home medications include carvedilol 12.5 mg twice a day, diltiazem  mg daily and diltiazem 60 mg 3 times a day.       EKG showed sinus rhythm with heart rate 66 bpm with first-degree AV block and PVCs. Telemetry revealed sinus bradycardia with PVCs and second-degree AV type I Wenckebach AV block.     We have optimize his medications however his blood pressure is uncontrolled and there is element of anxiety that is keeping his blood pressure up and we suggest to use antianxiolytic therapy and will defer to PCP.  We are adding amlodipine in the evening and have advised him to decrease amiodarone to 200 mg daily.    We have scheduled for Holter monitor to quantify PVCs and Lexiscan nuclear stress test to rule out ischemic heart disease.      Past Medical:  Past Medical History:   Diagnosis Date    Hyperlipidemia     Hypertension     Thrombocytopenia        Past Surgical:  Past Surgical History:   Procedure Laterality Date    CT BIOPSY BONE MARROW  7/3/2024    CT BONE MARROW BIOPSY 7/3/2024 [facility] CT SCAN    TONSILLECTOMY         Family History:  Family History   Problem Relation Age of Onset    Dementia Mother     Parkinson's Disease Father        Social History:  Social History     Tobacco Use    Smoking status: Never    Smokeless tobacco: Never   Vaping Use

## 2025-05-01 DIAGNOSIS — I49.3 PREMATURE VENTRICULAR CONTRACTION ON ELECTROCARDIOGRAM: ICD-10-CM

## 2025-05-01 DIAGNOSIS — R00.1 SINUS BRADYCARDIA BY ELECTROCARDIOGRAM: ICD-10-CM

## 2025-05-01 DIAGNOSIS — R07.9 CHEST PAIN, UNSPECIFIED TYPE: ICD-10-CM

## 2025-05-01 DIAGNOSIS — Z86.2 HISTORY OF IDIOPATHIC THROMBOCYTOPENIC PURPURA: ICD-10-CM

## 2025-05-01 DIAGNOSIS — R94.31 ABNORMAL ELECTROCARDIOGRAM (ECG) (EKG): Primary | ICD-10-CM

## 2025-05-08 ENCOUNTER — HOSPITAL ENCOUNTER (OUTPATIENT)
Age: 75
Discharge: HOME | End: 2025-05-08
Payer: MEDICARE

## 2025-05-08 ENCOUNTER — TELEPHONE (OUTPATIENT)
Age: 75
End: 2025-05-08

## 2025-05-08 ENCOUNTER — OFFICE VISIT (OUTPATIENT)
Age: 75
End: 2025-05-08
Payer: MEDICARE

## 2025-05-08 VITALS
HEART RATE: 60 BPM | DIASTOLIC BLOOD PRESSURE: 91 MMHG | OXYGEN SATURATION: 96 % | RESPIRATION RATE: 18 BRPM | SYSTOLIC BLOOD PRESSURE: 174 MMHG | WEIGHT: 154.2 LBS | TEMPERATURE: 98.2 F | BODY MASS INDEX: 22.13 KG/M2

## 2025-05-08 DIAGNOSIS — D69.3 ACUTE IDIOPATHIC THROMBOCYTOPENIC PURPURA (HCC): ICD-10-CM

## 2025-05-08 DIAGNOSIS — D69.3 IDIOPATHIC THROMBOCYTOPENIC PURPURA (HCC): Primary | ICD-10-CM

## 2025-05-08 LAB
BASOPHILS # BLD: 0.04 K/UL (ref 0–0.2)
BASOPHILS NFR BLD: 1 % (ref 0–2)
EOSINOPHIL # BLD: 0.21 K/UL (ref 0–0.4)
EOSINOPHILS RELATIVE PERCENT: 5 % (ref 1–4)
ERYTHROCYTE [DISTWIDTH] IN BLOOD BY AUTOMATED COUNT: 14.2 % (ref 12.5–15.4)
HCT VFR BLD AUTO: 41.5 % (ref 41–53)
HGB BLD-MCNC: 13.7 G/DL (ref 13.5–17.5)
LYMPHOCYTES NFR BLD: 0.94 K/UL (ref 1–4.8)
LYMPHOCYTES RELATIVE PERCENT: 23 % (ref 24–44)
MCH RBC QN AUTO: 27.5 PG (ref 26–34)
MCHC RBC AUTO-ENTMCNC: 33.1 G/DL (ref 31–37)
MCV RBC AUTO: 83.2 FL (ref 80–100)
MONOCYTES NFR BLD: 0.29 K/UL (ref 0.1–1.2)
MONOCYTES NFR BLD: 7 % (ref 2–11)
MORPHOLOGY: NORMAL
NEUTROPHILS NFR BLD: 64 % (ref 36–66)
NEUTS SEG NFR BLD: 2.62 K/UL (ref 1.8–7.7)
PLATELET # BLD AUTO: 6 K/UL (ref 140–450)
PMV BLD AUTO: 12.5 FL (ref 6–12)
RBC # BLD AUTO: 4.99 M/UL (ref 4.5–5.9)
WBC OTHER # BLD: 4.1 K/UL (ref 3.5–11)

## 2025-05-08 PROCEDURE — 36415 COLL VENOUS BLD VENIPUNCTURE: CPT

## 2025-05-08 PROCEDURE — 1126F AMNT PAIN NOTED NONE PRSNT: CPT | Performed by: INTERNAL MEDICINE

## 2025-05-08 PROCEDURE — 1159F MED LIST DOCD IN RCRD: CPT | Performed by: INTERNAL MEDICINE

## 2025-05-08 PROCEDURE — 99214 OFFICE O/P EST MOD 30 MIN: CPT | Performed by: INTERNAL MEDICINE

## 2025-05-08 PROCEDURE — 3080F DIAST BP >= 90 MM HG: CPT | Performed by: INTERNAL MEDICINE

## 2025-05-08 PROCEDURE — 85025 COMPLETE CBC W/AUTO DIFF WBC: CPT

## 2025-05-08 PROCEDURE — 1123F ACP DISCUSS/DSCN MKR DOCD: CPT | Performed by: INTERNAL MEDICINE

## 2025-05-08 PROCEDURE — 3077F SYST BP >= 140 MM HG: CPT | Performed by: INTERNAL MEDICINE

## 2025-05-08 RX ORDER — BUSPIRONE HYDROCHLORIDE 5 MG/1
5 TABLET ORAL 3 TIMES DAILY
COMMUNITY

## 2025-05-08 NOTE — TELEPHONE ENCOUNTER
Instructions   from Dr. Bia Coon MD    Aware of plt counts  Continue weekly cbc.   Rv in 5 weeks.       Pt to continue to get weekly CBC  RV 6/12/25 at 10:45 am

## 2025-05-08 NOTE — PROGRESS NOTES
DIAGNOSIS:    ITP   Multiple adenopathy   CURRENT THERAPY:  Received Steroids with no response  IVIG with short lived response June/24  Rituximab, started July/2024  Unfortunately, short response to rituximab, plan to transition to avatrombopag September/2024 2/2025, relapsed disease with worsening platelet count.  Managed with adjusted dose of avatrombopag  Dose escalated avatrombopag led to excellent response  4/2025, relapsed disease with worsening thrombocytopenia.  Received IVIG  Plan to transition to fostamatinib  BRIEF CASE HISTORY:       The patient is a 74 y.o.   male who is admitted to the hospital for petechiae and was found to have severe thrombocytopenia which is new onset.  The patient describes fatigue, mucosal bleeding from the mouth.  No hemorrhagic diatheses otherwise.  No hematuria or blood in the stool.   The patient was here couple weeks ago with low platelets and was diagnosed with ITP.  We wanted to do a bone marrow aspiration and biopsy but could not be done at while in-house.  The patient was steroid refractory and responded briefly to IVIG.  Also CT scan showed multiple and lymphadenopathy concerning for lymphomatous masses, especially in mediastinum and retroperitoneum   Pt received Rituximab, bone marrow bx showed no primary BM disorder. 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   He received 4 doses of rituximab which were well-tolerated he had a good but brief response and relapsed quickly.  We decided to treat him with avatrombopag with excellent response  In February/2025, his ITP relapsed so we increased the dose.  That was associate with excellent response.  Unfortunately the response was short-lived and he was admitted in April/2025 with recurrent thrombocytopenia.  CT scan showed persistent lymphadenopathy and multiple small lung nodules.  He received IVIG with improvement.  The plan is to transition to fostamatinib.  INTERIM HISTORY  Patient comes in today for a follow-up.  He has been on

## 2025-05-09 ENCOUNTER — HOSPITAL ENCOUNTER (OUTPATIENT)
Age: 75
Discharge: HOME | End: 2025-05-11
Attending: INTERNAL MEDICINE
Payer: MEDICARE

## 2025-05-09 DIAGNOSIS — I49.3 PVC (PREMATURE VENTRICULAR CONTRACTION): ICD-10-CM

## 2025-05-09 PROCEDURE — 93225 XTRNL ECG REC<48 HRS REC: CPT

## 2025-05-12 ENCOUNTER — TELEPHONE (OUTPATIENT)
Age: 75
End: 2025-05-12

## 2025-05-12 ENCOUNTER — TELEPHONE (OUTPATIENT)
Dept: INFUSION THERAPY | Age: 75
End: 2025-05-12

## 2025-05-12 NOTE — TELEPHONE ENCOUNTER
Pt called stating he woke up with blood in his mouth. He states he had a small cut on his tongue that bled, but the bleeding has resolved. He states the bleeding was minimal and not emergent. He denies any other bleeding at this time. He states he comes in weekly for plt check, and plans to still come on Thur or Fri for lab draw. Writer states to monitor bleeding, but if it continues, pt to go to ER or come in for lab draw sooner. Pt verbalized understanding.

## 2025-05-12 NOTE — TELEPHONE ENCOUNTER
Pre procedure call completed.  Location and time confirmed Nothing to eat or drink after midnight no caffeine.  Verbal underStanding per patient.

## 2025-05-13 ENCOUNTER — HOSPITAL ENCOUNTER (OUTPATIENT)
Age: 75
Discharge: HOME | End: 2025-05-15
Attending: INTERNAL MEDICINE
Payer: MEDICARE

## 2025-05-13 ENCOUNTER — HOSPITAL ENCOUNTER (OUTPATIENT)
Dept: NUCLEAR MEDICINE | Age: 75
Discharge: HOME | End: 2025-05-15
Attending: INTERNAL MEDICINE
Payer: MEDICARE

## 2025-05-13 DIAGNOSIS — Z86.2 HISTORY OF IDIOPATHIC THROMBOCYTOPENIC PURPURA: ICD-10-CM

## 2025-05-13 DIAGNOSIS — I49.3 PREMATURE VENTRICULAR CONTRACTION ON ELECTROCARDIOGRAM: ICD-10-CM

## 2025-05-13 DIAGNOSIS — R94.31 ABNORMAL ELECTROCARDIOGRAM (ECG) (EKG): ICD-10-CM

## 2025-05-13 DIAGNOSIS — R07.9 CHEST PAIN, UNSPECIFIED TYPE: ICD-10-CM

## 2025-05-13 DIAGNOSIS — R00.1 SINUS BRADYCARDIA BY ELECTROCARDIOGRAM: ICD-10-CM

## 2025-05-13 PROCEDURE — 93017 CV STRESS TEST TRACING ONLY: CPT

## 2025-05-13 PROCEDURE — 2500000003 HC RX 250 WO HCPCS: Performed by: INTERNAL MEDICINE

## 2025-05-13 PROCEDURE — 78452 HT MUSCLE IMAGE SPECT MULT: CPT

## 2025-05-13 PROCEDURE — 3430000000 HC RX DIAGNOSTIC RADIOPHARMACEUTICAL: Performed by: INTERNAL MEDICINE

## 2025-05-13 PROCEDURE — 6360000002 HC RX W HCPCS: Performed by: INTERNAL MEDICINE

## 2025-05-13 PROCEDURE — A9500 TC99M SESTAMIBI: HCPCS | Performed by: INTERNAL MEDICINE

## 2025-05-13 RX ORDER — SODIUM CHLORIDE 9 MG/ML
500 INJECTION, SOLUTION INTRAVENOUS CONTINUOUS PRN
Status: DISCONTINUED | OUTPATIENT
Start: 2025-05-13 | End: 2025-05-13

## 2025-05-13 RX ORDER — SODIUM CHLORIDE 0.9 % (FLUSH) 0.9 %
10 SYRINGE (ML) INJECTION PRN
Status: DISCONTINUED | OUTPATIENT
Start: 2025-05-13 | End: 2025-05-16 | Stop reason: HOSPADM

## 2025-05-13 RX ORDER — SODIUM CHLORIDE 0.9 % (FLUSH) 0.9 %
10 SYRINGE (ML) INJECTION PRN
Status: COMPLETED | OUTPATIENT
Start: 2025-05-13 | End: 2025-05-13

## 2025-05-13 RX ORDER — TETRAKIS(2-METHOXYISOBUTYLISOCYANIDE)COPPER(I) TETRAFLUOROBORATE 1 MG/ML
12 INJECTION, POWDER, LYOPHILIZED, FOR SOLUTION INTRAVENOUS
Status: COMPLETED | OUTPATIENT
Start: 2025-05-13 | End: 2025-05-13

## 2025-05-13 RX ORDER — REGADENOSON 0.08 MG/ML
0.4 INJECTION, SOLUTION INTRAVENOUS
Status: COMPLETED | OUTPATIENT
Start: 2025-05-13 | End: 2025-05-13

## 2025-05-13 RX ORDER — ALBUTEROL SULFATE 90 UG/1
2 INHALANT RESPIRATORY (INHALATION) PRN
Status: DISCONTINUED | OUTPATIENT
Start: 2025-05-13 | End: 2025-05-13

## 2025-05-13 RX ORDER — METOPROLOL TARTRATE 1 MG/ML
5 INJECTION, SOLUTION INTRAVENOUS EVERY 5 MIN PRN
Status: DISCONTINUED | OUTPATIENT
Start: 2025-05-13 | End: 2025-05-13

## 2025-05-13 RX ORDER — ATROPINE SULFATE 0.1 MG/ML
0.5 INJECTION INTRAVENOUS EVERY 5 MIN PRN
Status: DISCONTINUED | OUTPATIENT
Start: 2025-05-13 | End: 2025-05-13

## 2025-05-13 RX ORDER — AMINOPHYLLINE 25 MG/ML
50 INJECTION, SOLUTION INTRAVENOUS PRN
Status: DISCONTINUED | OUTPATIENT
Start: 2025-05-13 | End: 2025-05-13

## 2025-05-13 RX ORDER — TETRAKIS(2-METHOXYISOBUTYLISOCYANIDE)COPPER(I) TETRAFLUOROBORATE 1 MG/ML
36 INJECTION, POWDER, LYOPHILIZED, FOR SOLUTION INTRAVENOUS
Status: COMPLETED | OUTPATIENT
Start: 2025-05-13 | End: 2025-05-13

## 2025-05-13 RX ORDER — NITROGLYCERIN 0.4 MG/1
0.4 TABLET SUBLINGUAL EVERY 5 MIN PRN
Status: DISCONTINUED | OUTPATIENT
Start: 2025-05-13 | End: 2025-05-13

## 2025-05-13 RX ORDER — SODIUM CHLORIDE 0.9 % (FLUSH) 0.9 %
5-40 SYRINGE (ML) INJECTION PRN
Status: DISCONTINUED | OUTPATIENT
Start: 2025-05-13 | End: 2025-05-13

## 2025-05-13 RX ADMIN — TETRAKIS(2-METHOXYISOBUTYLISOCYANIDE)COPPER(I) TETRAFLUOROBORATE 42.33 MILLICURIE: 1 INJECTION, POWDER, LYOPHILIZED, FOR SOLUTION INTRAVENOUS at 09:25

## 2025-05-13 RX ADMIN — SODIUM CHLORIDE, PRESERVATIVE FREE 10 ML: 5 INJECTION INTRAVENOUS at 09:25

## 2025-05-13 RX ADMIN — SODIUM CHLORIDE, PRESERVATIVE FREE 10 ML: 5 INJECTION INTRAVENOUS at 09:21

## 2025-05-13 RX ADMIN — SODIUM CHLORIDE, PRESERVATIVE FREE 10 ML: 5 INJECTION INTRAVENOUS at 07:41

## 2025-05-13 RX ADMIN — REGADENOSON 0.4 MG: 0.08 INJECTION, SOLUTION INTRAVENOUS at 09:23

## 2025-05-13 RX ADMIN — TETRAKIS(2-METHOXYISOBUTYLISOCYANIDE)COPPER(I) TETRAFLUOROBORATE 17.06 MILLICURIE: 1 INJECTION, POWDER, LYOPHILIZED, FOR SOLUTION INTRAVENOUS at 07:40

## 2025-05-14 LAB
STRESS BASELINE DIAS BP: 103 MMHG
STRESS BASELINE HR: 75 BPM
STRESS BASELINE SYS BP: 184 MMHG
STRESS ESTIMATED WORKLOAD: 1 METS
STRESS PEAK DIAS BP: 103 MMHG
STRESS PEAK SYS BP: 184 MMHG
STRESS PERCENT HR ACHIEVED: 63 %
STRESS POST PEAK HR: 92 BPM
STRESS RATE PRESSURE PRODUCT: NORMAL BPM*MMHG
STRESS ST DEPRESSION: 0 MM
STRESS TARGET HR: 145 BPM

## 2025-05-16 ENCOUNTER — HOSPITAL ENCOUNTER (OUTPATIENT)
Dept: INFUSION THERAPY | Age: 75
Discharge: HOME | End: 2025-05-16
Payer: MEDICARE

## 2025-05-16 ENCOUNTER — HOSPITAL ENCOUNTER (OUTPATIENT)
Age: 75
Discharge: HOME | End: 2025-05-16
Payer: MEDICARE

## 2025-05-16 ENCOUNTER — TELEPHONE (OUTPATIENT)
Dept: INFUSION THERAPY | Age: 75
End: 2025-05-16

## 2025-05-16 VITALS
HEART RATE: 57 BPM | WEIGHT: 155 LBS | OXYGEN SATURATION: 97 % | SYSTOLIC BLOOD PRESSURE: 140 MMHG | TEMPERATURE: 97.5 F | DIASTOLIC BLOOD PRESSURE: 81 MMHG | RESPIRATION RATE: 16 BRPM | BODY MASS INDEX: 22.24 KG/M2

## 2025-05-16 DIAGNOSIS — D69.3 CHRONIC ITP (IDIOPATHIC THROMBOCYTOPENIC PURPURA) (HCC): ICD-10-CM

## 2025-05-16 DIAGNOSIS — D69.3 ACUTE IDIOPATHIC THROMBOCYTOPENIC PURPURA (HCC): ICD-10-CM

## 2025-05-16 LAB
BASOPHILS # BLD: 0 K/UL (ref 0–0.2)
BASOPHILS NFR BLD: 1 % (ref 0–2)
EOSINOPHIL # BLD: 0.3 K/UL (ref 0–0.4)
EOSINOPHILS RELATIVE PERCENT: 6 % (ref 1–4)
ERYTHROCYTE [DISTWIDTH] IN BLOOD BY AUTOMATED COUNT: 14.6 % (ref 12.5–15.4)
HCT VFR BLD AUTO: 39.9 % (ref 41–53)
HGB BLD-MCNC: 13.1 G/DL (ref 13.5–17.5)
LYMPHOCYTES NFR BLD: 1 K/UL (ref 1–4.8)
LYMPHOCYTES RELATIVE PERCENT: 20 % (ref 24–44)
MCH RBC QN AUTO: 27.7 PG (ref 26–34)
MCHC RBC AUTO-ENTMCNC: 32.8 G/DL (ref 31–37)
MCV RBC AUTO: 84.4 FL (ref 80–100)
MONOCYTES NFR BLD: 0.3 K/UL (ref 0.1–1.2)
MONOCYTES NFR BLD: 6 % (ref 2–11)
NEUTROPHILS NFR BLD: 67 % (ref 36–66)
NEUTS SEG NFR BLD: 3.5 K/UL (ref 1.8–7.7)
PLATELET # BLD AUTO: 6 K/UL (ref 140–450)
PMV BLD AUTO: 12 FL (ref 6–12)
RBC # BLD AUTO: 4.73 M/UL (ref 4.5–5.9)
WBC OTHER # BLD: 5.2 K/UL (ref 3.5–11)

## 2025-05-16 PROCEDURE — 36415 COLL VENOUS BLD VENIPUNCTURE: CPT

## 2025-05-16 PROCEDURE — 6360000002 HC RX W HCPCS: Performed by: INTERNAL MEDICINE

## 2025-05-16 PROCEDURE — 96365 THER/PROPH/DIAG IV INF INIT: CPT

## 2025-05-16 PROCEDURE — 2580000003 HC RX 258: Performed by: INTERNAL MEDICINE

## 2025-05-16 PROCEDURE — 96366 THER/PROPH/DIAG IV INF ADDON: CPT

## 2025-05-16 PROCEDURE — 6370000000 HC RX 637 (ALT 250 FOR IP): Performed by: INTERNAL MEDICINE

## 2025-05-16 PROCEDURE — 85025 COMPLETE CBC W/AUTO DIFF WBC: CPT

## 2025-05-16 RX ORDER — ACETAMINOPHEN 325 MG/1
650 TABLET ORAL ONCE
Status: COMPLETED | OUTPATIENT
Start: 2025-05-16 | End: 2025-05-16

## 2025-05-16 RX ORDER — SODIUM CHLORIDE 9 MG/ML
5-250 INJECTION, SOLUTION INTRAVENOUS PRN
Status: DISCONTINUED | OUTPATIENT
Start: 2025-05-16 | End: 2025-05-17 | Stop reason: HOSPADM

## 2025-05-16 RX ORDER — DIPHENHYDRAMINE HCL 25 MG
25 TABLET ORAL ONCE
Status: COMPLETED | OUTPATIENT
Start: 2025-05-16 | End: 2025-05-16

## 2025-05-16 RX ADMIN — IMMUNE GLOBULIN (HUMAN) 70000 MG: 10 INJECTION INTRAVENOUS; SUBCUTANEOUS at 11:59

## 2025-05-16 RX ADMIN — DIPHENHYDRAMINE HYDROCHLORIDE 25 MG: 25 TABLET ORAL at 11:53

## 2025-05-16 RX ADMIN — ACETAMINOPHEN 650 MG: 325 TABLET ORAL at 11:53

## 2025-05-16 RX ADMIN — SODIUM CHLORIDE 150 ML/HR: 0.9 INJECTION, SOLUTION INTRAVENOUS at 11:53

## 2025-05-16 NOTE — TELEPHONE ENCOUNTER
Patient had labs drawn today. Platelets 6. Dr. Jaime notified and order received to bring patient in today for one dose of IVIG. Patient notified and will be arriving this afternoon for treatment.

## 2025-05-16 NOTE — PROGRESS NOTES
Patient arrived for a one time dose of IVIG per Dr. Jaime due to low platelet count.  Arrives ambulatory.  Platelets 6  Bleeding precautions reviewed with patient and patient's spouse. Verbalized understanding.  IVIG completed without incident.  Discharged in stable condition.  Returns 6/12/2025 for office visit with Dr. Jaime.

## 2025-05-22 ENCOUNTER — HOSPITAL ENCOUNTER (OUTPATIENT)
Dept: INFUSION THERAPY | Age: 75
Discharge: HOME | End: 2025-05-22
Payer: MEDICARE

## 2025-05-22 ENCOUNTER — TELEPHONE (OUTPATIENT)
Age: 75
End: 2025-05-22

## 2025-05-22 ENCOUNTER — TELEPHONE (OUTPATIENT)
Dept: INFUSION THERAPY | Age: 75
End: 2025-05-22

## 2025-05-22 ENCOUNTER — HOSPITAL ENCOUNTER (OUTPATIENT)
Age: 75
Discharge: HOME | End: 2025-05-22
Payer: MEDICARE

## 2025-05-22 VITALS
SYSTOLIC BLOOD PRESSURE: 167 MMHG | DIASTOLIC BLOOD PRESSURE: 78 MMHG | RESPIRATION RATE: 16 BRPM | HEART RATE: 56 BPM | TEMPERATURE: 98.1 F

## 2025-05-22 DIAGNOSIS — D69.3 CHRONIC ITP (IDIOPATHIC THROMBOCYTOPENIC PURPURA) (HCC): Primary | ICD-10-CM

## 2025-05-22 DIAGNOSIS — D69.3 ACUTE IDIOPATHIC THROMBOCYTOPENIC PURPURA (HCC): ICD-10-CM

## 2025-05-22 LAB
BASOPHILS # BLD: 0.04 K/UL (ref 0–0.2)
BASOPHILS NFR BLD: 1 % (ref 0–2)
EOSINOPHIL # BLD: 0.17 K/UL (ref 0–0.4)
EOSINOPHILS RELATIVE PERCENT: 4 % (ref 1–4)
ERYTHROCYTE [DISTWIDTH] IN BLOOD BY AUTOMATED COUNT: 15.1 % (ref 12.5–15.4)
HCT VFR BLD AUTO: 39.5 % (ref 41–53)
HGB BLD-MCNC: 12.9 G/DL (ref 13.5–17.5)
LYMPHOCYTES NFR BLD: 0.8 K/UL (ref 1–4.8)
LYMPHOCYTES RELATIVE PERCENT: 19 % (ref 24–44)
MCH RBC QN AUTO: 27.7 PG (ref 26–34)
MCHC RBC AUTO-ENTMCNC: 32.7 G/DL (ref 31–37)
MCV RBC AUTO: 84.6 FL (ref 80–100)
MONOCYTES NFR BLD: 0.25 K/UL (ref 0.1–1.2)
MONOCYTES NFR BLD: 6 % (ref 2–11)
MORPHOLOGY: NORMAL
NEUTROPHILS NFR BLD: 70 % (ref 36–66)
NEUTS SEG NFR BLD: 2.94 K/UL (ref 1.8–7.7)
PLATELET # BLD AUTO: 7 K/UL (ref 140–450)
PMV BLD AUTO: 11.3 FL (ref 6–12)
RBC # BLD AUTO: 4.66 M/UL (ref 4.5–5.9)
WBC OTHER # BLD: 4.2 K/UL (ref 3.5–11)

## 2025-05-22 PROCEDURE — 96366 THER/PROPH/DIAG IV INF ADDON: CPT

## 2025-05-22 PROCEDURE — 6370000000 HC RX 637 (ALT 250 FOR IP): Performed by: INTERNAL MEDICINE

## 2025-05-22 PROCEDURE — 6360000002 HC RX W HCPCS: Performed by: INTERNAL MEDICINE

## 2025-05-22 PROCEDURE — 96365 THER/PROPH/DIAG IV INF INIT: CPT

## 2025-05-22 PROCEDURE — 85025 COMPLETE CBC W/AUTO DIFF WBC: CPT

## 2025-05-22 PROCEDURE — 2580000003 HC RX 258: Performed by: INTERNAL MEDICINE

## 2025-05-22 PROCEDURE — 36415 COLL VENOUS BLD VENIPUNCTURE: CPT

## 2025-05-22 RX ORDER — FAMOTIDINE 10 MG/ML
20 INJECTION, SOLUTION INTRAVENOUS
Status: CANCELLED | OUTPATIENT
Start: 2025-05-22

## 2025-05-22 RX ORDER — ACETAMINOPHEN 325 MG/1
650 TABLET ORAL ONCE
Status: CANCELLED | OUTPATIENT
Start: 2025-05-22 | End: 2025-05-22

## 2025-05-22 RX ORDER — ACETAMINOPHEN 325 MG/1
650 TABLET ORAL
OUTPATIENT
Start: 2025-05-22

## 2025-05-22 RX ORDER — SODIUM CHLORIDE 0.9 % (FLUSH) 0.9 %
5-40 SYRINGE (ML) INJECTION PRN
Status: CANCELLED | OUTPATIENT
Start: 2025-05-22

## 2025-05-22 RX ORDER — HEPARIN 100 UNIT/ML
500 SYRINGE INTRAVENOUS PRN
Status: CANCELLED | OUTPATIENT
Start: 2025-05-22

## 2025-05-22 RX ORDER — SODIUM CHLORIDE 9 MG/ML
5-250 INJECTION, SOLUTION INTRAVENOUS PRN
OUTPATIENT
Start: 2025-05-22

## 2025-05-22 RX ORDER — HYDROCORTISONE SODIUM SUCCINATE 100 MG/2ML
100 INJECTION INTRAMUSCULAR; INTRAVENOUS
OUTPATIENT
Start: 2025-05-22

## 2025-05-22 RX ORDER — DIPHENHYDRAMINE HCL 25 MG
25 TABLET ORAL ONCE
Status: CANCELLED | OUTPATIENT
Start: 2025-05-22 | End: 2025-05-22

## 2025-05-22 RX ORDER — FAMOTIDINE 10 MG/ML
20 INJECTION, SOLUTION INTRAVENOUS
OUTPATIENT
Start: 2025-05-22

## 2025-05-22 RX ORDER — EPINEPHRINE 1 MG/ML
0.3 INJECTION, SOLUTION, CONCENTRATE INTRAVENOUS PRN
OUTPATIENT
Start: 2025-05-22

## 2025-05-22 RX ORDER — SODIUM CHLORIDE 9 MG/ML
INJECTION, SOLUTION INTRAVENOUS CONTINUOUS
OUTPATIENT
Start: 2025-05-22

## 2025-05-22 RX ORDER — HYDROCORTISONE SODIUM SUCCINATE 100 MG/2ML
100 INJECTION INTRAMUSCULAR; INTRAVENOUS
Status: CANCELLED | OUTPATIENT
Start: 2025-05-22

## 2025-05-22 RX ORDER — SODIUM CHLORIDE 0.9 % (FLUSH) 0.9 %
5-40 SYRINGE (ML) INJECTION PRN
Status: DISCONTINUED | OUTPATIENT
Start: 2025-05-22 | End: 2025-05-23 | Stop reason: HOSPADM

## 2025-05-22 RX ORDER — SODIUM CHLORIDE 9 MG/ML
5-250 INJECTION, SOLUTION INTRAVENOUS PRN
Status: CANCELLED | OUTPATIENT
Start: 2025-05-22

## 2025-05-22 RX ORDER — MEPERIDINE HYDROCHLORIDE 50 MG/ML
12.5 INJECTION INTRAMUSCULAR; INTRAVENOUS; SUBCUTANEOUS PRN
OUTPATIENT
Start: 2025-05-22

## 2025-05-22 RX ORDER — DIPHENHYDRAMINE HCL 25 MG
25 TABLET ORAL ONCE
Status: COMPLETED | OUTPATIENT
Start: 2025-05-22 | End: 2025-05-22

## 2025-05-22 RX ORDER — ALBUTEROL SULFATE 90 UG/1
4 INHALANT RESPIRATORY (INHALATION) PRN
Status: CANCELLED | OUTPATIENT
Start: 2025-05-22

## 2025-05-22 RX ORDER — SODIUM CHLORIDE 9 MG/ML
INJECTION, SOLUTION INTRAVENOUS CONTINUOUS
Status: CANCELLED | OUTPATIENT
Start: 2025-05-22

## 2025-05-22 RX ORDER — DIPHENHYDRAMINE HYDROCHLORIDE 50 MG/ML
50 INJECTION, SOLUTION INTRAMUSCULAR; INTRAVENOUS
OUTPATIENT
Start: 2025-05-22

## 2025-05-22 RX ORDER — ALBUTEROL SULFATE 90 UG/1
4 INHALANT RESPIRATORY (INHALATION) PRN
OUTPATIENT
Start: 2025-05-22

## 2025-05-22 RX ORDER — ACETAMINOPHEN 325 MG/1
650 TABLET ORAL
Status: CANCELLED | OUTPATIENT
Start: 2025-05-22

## 2025-05-22 RX ORDER — DIPHENHYDRAMINE HYDROCHLORIDE 50 MG/ML
50 INJECTION, SOLUTION INTRAMUSCULAR; INTRAVENOUS
Status: CANCELLED | OUTPATIENT
Start: 2025-05-22

## 2025-05-22 RX ORDER — SODIUM CHLORIDE 9 MG/ML
5-250 INJECTION, SOLUTION INTRAVENOUS PRN
Status: DISCONTINUED | OUTPATIENT
Start: 2025-05-22 | End: 2025-05-23 | Stop reason: HOSPADM

## 2025-05-22 RX ORDER — EPINEPHRINE 1 MG/ML
0.3 INJECTION, SOLUTION, CONCENTRATE INTRAVENOUS PRN
Status: CANCELLED | OUTPATIENT
Start: 2025-05-22

## 2025-05-22 RX ORDER — ONDANSETRON 2 MG/ML
8 INJECTION INTRAMUSCULAR; INTRAVENOUS
Status: CANCELLED | OUTPATIENT
Start: 2025-05-22

## 2025-05-22 RX ORDER — HEPARIN 100 UNIT/ML
500 SYRINGE INTRAVENOUS PRN
OUTPATIENT
Start: 2025-05-22

## 2025-05-22 RX ORDER — ONDANSETRON 2 MG/ML
8 INJECTION INTRAMUSCULAR; INTRAVENOUS
OUTPATIENT
Start: 2025-05-22

## 2025-05-22 RX ORDER — MEPERIDINE HYDROCHLORIDE 50 MG/ML
12.5 INJECTION INTRAMUSCULAR; INTRAVENOUS; SUBCUTANEOUS PRN
Status: CANCELLED | OUTPATIENT
Start: 2025-05-22

## 2025-05-22 RX ORDER — ACETAMINOPHEN 325 MG/1
650 TABLET ORAL ONCE
Status: COMPLETED | OUTPATIENT
Start: 2025-05-22 | End: 2025-05-22

## 2025-05-22 RX ADMIN — DIPHENHYDRAMINE HYDROCHLORIDE 25 MG: 25 TABLET ORAL at 12:12

## 2025-05-22 RX ADMIN — ACETAMINOPHEN 650 MG: 325 TABLET ORAL at 12:12

## 2025-05-22 RX ADMIN — SODIUM CHLORIDE 200 ML/HR: 0.9 INJECTION, SOLUTION INTRAVENOUS at 12:14

## 2025-05-22 RX ADMIN — IMMUNE GLOBULIN (HUMAN) 70000 MG: 10 INJECTION INTRAVENOUS; SUBCUTANEOUS at 12:30

## 2025-05-22 NOTE — TELEPHONE ENCOUNTER
Writer was notified by [employer] via Fax about patient Stress test results. Writer call patient asking him to come for office visit on 5/28/25 at 4:10 pm to discuss findings and treatment. Patient agree to office visit, but he stated that he may be admitted by oncology due to abnormal lab work. Advised patient to keep us informed

## 2025-05-22 NOTE — TELEPHONE ENCOUNTER
Critical plt 7. Spoke with Dr. Coon and he states for pt to receive IVIG. Pharmacy placed order and infusion notified. They state he is approved and they can take him today/now. Pt notified and will come in now for IVIG.

## 2025-05-28 ENCOUNTER — OFFICE VISIT (OUTPATIENT)
Age: 75
End: 2025-05-28
Payer: MEDICARE

## 2025-05-28 VITALS
HEIGHT: 70 IN | DIASTOLIC BLOOD PRESSURE: 93 MMHG | HEART RATE: 68 BPM | SYSTOLIC BLOOD PRESSURE: 178 MMHG | BODY MASS INDEX: 22.33 KG/M2 | WEIGHT: 156 LBS | OXYGEN SATURATION: 96 %

## 2025-05-28 DIAGNOSIS — I49.3 PVC (PREMATURE VENTRICULAR CONTRACTION): Primary | ICD-10-CM

## 2025-05-28 DIAGNOSIS — I10 ESSENTIAL HYPERTENSION: ICD-10-CM

## 2025-05-28 DIAGNOSIS — E78.5 DYSLIPIDEMIA: ICD-10-CM

## 2025-05-28 PROCEDURE — 3077F SYST BP >= 140 MM HG: CPT | Performed by: INTERNAL MEDICINE

## 2025-05-28 PROCEDURE — 1160F RVW MEDS BY RX/DR IN RCRD: CPT | Performed by: INTERNAL MEDICINE

## 2025-05-28 PROCEDURE — 1159F MED LIST DOCD IN RCRD: CPT | Performed by: INTERNAL MEDICINE

## 2025-05-28 PROCEDURE — 99215 OFFICE O/P EST HI 40 MIN: CPT | Performed by: INTERNAL MEDICINE

## 2025-05-28 PROCEDURE — 3080F DIAST BP >= 90 MM HG: CPT | Performed by: INTERNAL MEDICINE

## 2025-05-28 PROCEDURE — 1123F ACP DISCUSS/DSCN MKR DOCD: CPT | Performed by: INTERNAL MEDICINE

## 2025-05-28 NOTE — PROGRESS NOTES
Cardiology Office Follow up      Bruno Morales  1950  [id number]    Today: 5/28/25    CC: Patient is here for routine follow up visit.     HPI:   This is a 74-year-old gentleman presents for follow-up.    He has frequent PVCs noted with Holter monitor as well as abnormal nuclear stress test.  He needs further invasive workup with cardiac catheterization however his platelet counts are low and patient and his wife at this point want to hold off from any invasive cardiac workup. Once they are ready he will need to be admitted and optimized for thrombocytopenia and chronic kidney disease prior to cardiac catheterization.  We will need hematology and nephrology involvement.    He has history of idiopathic thrombocytopenic purpura, essential hypertension, dyslipidemia, frequent PVCs, lung nodules with mediastinal/hilar lymphadenopathy, CKD with anemia of chronic disease and GERD was admitted with severe thrombocytopenia and was treated with IVIG infusion as per hematology.       During hospitalization he had marked sinus bradycardia at night with heart rate dropping into high 30s and frequent PVCs. EKG showed sinus rhythm with heart rate 66 bpm with first-degree AV block and PVCs. Telemetry revealed sinus bradycardia with PVCs and second-degree AV type I Wenckebach AV block.        Past Medical:  Past Medical History:   Diagnosis Date    Hyperlipidemia     Hypertension     Thrombocytopenia          Past Surgical:  Past Surgical History:   Procedure Laterality Date    CT BIOPSY BONE MARROW  7/3/2024    CT BONE MARROW BIOPSY 7/3/2024 [facility] CT SCAN    TONSILLECTOMY           Family History:  Family History   Problem Relation Age of Onset    Dementia Mother     Parkinson's Disease Father        Social History:  Social History     Socioeconomic History    Marital status: Single     Spouse name: Not on file    Number of children: Not on file    Years of education: Not on file    Highest education level:

## 2025-05-29 ENCOUNTER — HOSPITAL ENCOUNTER (OUTPATIENT)
Age: 75
Discharge: HOME | End: 2025-05-29
Payer: MEDICARE

## 2025-05-29 DIAGNOSIS — D69.3 CHRONIC ITP (IDIOPATHIC THROMBOCYTOPENIC PURPURA) (HCC): ICD-10-CM

## 2025-05-29 LAB
ALBUMIN SERPL-MCNC: 3.7 G/DL (ref 3.5–5.2)
ALBUMIN/GLOB SERPL: 0.9 {RATIO} (ref 1–2.5)
ALP SERPL-CCNC: 46 U/L (ref 40–129)
ALT SERPL-CCNC: 33 U/L (ref 10–50)
ANION GAP SERPL CALCULATED.3IONS-SCNC: 12 MMOL/L (ref 9–16)
AST SERPL-CCNC: 41 U/L (ref 10–50)
BASOPHILS # BLD: 0 K/UL (ref 0–0.2)
BASOPHILS NFR BLD: 1 % (ref 0–2)
BILIRUB SERPL-MCNC: 0.5 MG/DL (ref 0–1.2)
BUN SERPL-MCNC: 25 MG/DL (ref 8–23)
CALCIUM SERPL-MCNC: 8.6 MG/DL (ref 8.6–10.4)
CHLORIDE SERPL-SCNC: 108 MMOL/L (ref 98–107)
CO2 SERPL-SCNC: 20 MMOL/L (ref 20–31)
CREAT SERPL-MCNC: 1.6 MG/DL (ref 0.7–1.2)
EOSINOPHIL # BLD: 0.2 K/UL (ref 0–0.4)
EOSINOPHILS RELATIVE PERCENT: 6 % (ref 1–4)
ERYTHROCYTE [DISTWIDTH] IN BLOOD BY AUTOMATED COUNT: 15.6 % (ref 12.5–15.4)
GFR, ESTIMATED: 45 ML/MIN/1.73M2
GLUCOSE SERPL-MCNC: 126 MG/DL (ref 74–99)
HCT VFR BLD AUTO: 37.4 % (ref 41–53)
HGB BLD-MCNC: 12.4 G/DL (ref 13.5–17.5)
LYMPHOCYTES NFR BLD: 0.7 K/UL (ref 1–4.8)
LYMPHOCYTES RELATIVE PERCENT: 24 % (ref 24–44)
MCH RBC QN AUTO: 28 PG (ref 26–34)
MCHC RBC AUTO-ENTMCNC: 33.1 G/DL (ref 31–37)
MCV RBC AUTO: 84.6 FL (ref 80–100)
MONOCYTES NFR BLD: 0.2 K/UL (ref 0.1–1.2)
MONOCYTES NFR BLD: 8 % (ref 2–11)
NEUTROPHILS NFR BLD: 61 % (ref 36–66)
NEUTS SEG NFR BLD: 1.8 K/UL (ref 1.8–7.7)
PLATELET # BLD AUTO: 6 K/UL (ref 140–450)
PMV BLD AUTO: 12.2 FL (ref 6–12)
POTASSIUM SERPL-SCNC: 3.8 MMOL/L (ref 3.7–5.3)
PROT SERPL-MCNC: 8 G/DL (ref 6.6–8.7)
RBC # BLD AUTO: 4.42 M/UL (ref 4.5–5.9)
SODIUM SERPL-SCNC: 140 MMOL/L (ref 136–145)
WBC OTHER # BLD: 2.9 K/UL (ref 3.5–11)

## 2025-05-29 PROCEDURE — 85025 COMPLETE CBC W/AUTO DIFF WBC: CPT

## 2025-05-29 PROCEDURE — 80053 COMPREHEN METABOLIC PANEL: CPT

## 2025-05-29 PROCEDURE — 36415 COLL VENOUS BLD VENIPUNCTURE: CPT

## 2025-05-30 ENCOUNTER — HOSPITAL ENCOUNTER (INPATIENT)
Age: 75
LOS: 4 days | Discharge: ANOTHER HOSPITAL | DRG: 800 | End: 2025-06-03
Attending: EMERGENCY MEDICINE | Admitting: STUDENT IN AN ORGANIZED HEALTH CARE EDUCATION/TRAINING PROGRAM
Payer: MEDICARE

## 2025-05-30 DIAGNOSIS — D69.6 LOW PLATELET COUNT: ICD-10-CM

## 2025-05-30 DIAGNOSIS — R04.0 EPISTAXIS: Primary | ICD-10-CM

## 2025-05-30 DIAGNOSIS — Z01.818 PRE-OPERATIVE CLEARANCE: ICD-10-CM

## 2025-05-30 LAB
ALBUMIN SERPL-MCNC: 3.9 G/DL (ref 3.5–5.2)
ALBUMIN/GLOB SERPL: 0.8 {RATIO} (ref 1–2.5)
ALP SERPL-CCNC: 50 U/L (ref 40–129)
ALT SERPL-CCNC: 34 U/L (ref 10–50)
ANION GAP SERPL CALCULATED.3IONS-SCNC: 13 MMOL/L (ref 9–16)
AST SERPL-CCNC: 41 U/L (ref 10–50)
BASOPHILS # BLD: 0 K/UL (ref 0–0.2)
BASOPHILS NFR BLD: 1 % (ref 0–2)
BILIRUB SERPL-MCNC: 0.6 MG/DL (ref 0–1.2)
BUN SERPL-MCNC: 30 MG/DL (ref 8–23)
CALCIUM SERPL-MCNC: 8.9 MG/DL (ref 8.6–10.4)
CHLORIDE SERPL-SCNC: 108 MMOL/L (ref 98–107)
CO2 SERPL-SCNC: 19 MMOL/L (ref 20–31)
CREAT SERPL-MCNC: 1.5 MG/DL (ref 0.7–1.2)
EOSINOPHIL # BLD: 0.2 K/UL (ref 0–0.4)
EOSINOPHILS RELATIVE PERCENT: 5 % (ref 1–4)
ERYTHROCYTE [DISTWIDTH] IN BLOOD BY AUTOMATED COUNT: 16.1 % (ref 12.5–15.4)
GFR, ESTIMATED: 48 ML/MIN/1.73M2
GLUCOSE SERPL-MCNC: 99 MG/DL (ref 74–99)
HCT VFR BLD AUTO: 39.7 % (ref 41–53)
HGB BLD-MCNC: 13.2 G/DL (ref 13.5–17.5)
LYMPHOCYTES NFR BLD: 0.6 K/UL (ref 1–4.8)
LYMPHOCYTES RELATIVE PERCENT: 18 % (ref 24–44)
MCH RBC QN AUTO: 28.1 PG (ref 26–34)
MCHC RBC AUTO-ENTMCNC: 33.1 G/DL (ref 31–37)
MCV RBC AUTO: 84.9 FL (ref 80–100)
MONOCYTES NFR BLD: 0.3 K/UL (ref 0.1–1.2)
MONOCYTES NFR BLD: 8 % (ref 2–11)
NEUTROPHILS NFR BLD: 68 % (ref 36–66)
NEUTS SEG NFR BLD: 2.4 K/UL (ref 1.8–7.7)
PLATELET # BLD AUTO: 6 K/UL (ref 140–450)
PMV BLD AUTO: 13.5 FL (ref 6–12)
POTASSIUM SERPL-SCNC: 4.1 MMOL/L (ref 3.7–5.3)
PROT SERPL-MCNC: 8.5 G/DL (ref 6.6–8.7)
RBC # BLD AUTO: 4.68 M/UL (ref 4.5–5.9)
SODIUM SERPL-SCNC: 140 MMOL/L (ref 136–145)
WBC OTHER # BLD: 3.6 K/UL (ref 3.5–11)

## 2025-05-30 PROCEDURE — 6360000002 HC RX W HCPCS: Performed by: PHYSICIAN ASSISTANT

## 2025-05-30 PROCEDURE — 2580000003 HC RX 258: Performed by: PHYSICIAN ASSISTANT

## 2025-05-30 PROCEDURE — 6360000002 HC RX W HCPCS: Performed by: STUDENT IN AN ORGANIZED HEALTH CARE EDUCATION/TRAINING PROGRAM

## 2025-05-30 PROCEDURE — 99222 1ST HOSP IP/OBS MODERATE 55: CPT | Performed by: STUDENT IN AN ORGANIZED HEALTH CARE EDUCATION/TRAINING PROGRAM

## 2025-05-30 PROCEDURE — 36415 COLL VENOUS BLD VENIPUNCTURE: CPT

## 2025-05-30 PROCEDURE — 6370000000 HC RX 637 (ALT 250 FOR IP): Performed by: STUDENT IN AN ORGANIZED HEALTH CARE EDUCATION/TRAINING PROGRAM

## 2025-05-30 PROCEDURE — 2060000000 HC ICU INTERMEDIATE R&B

## 2025-05-30 PROCEDURE — 85025 COMPLETE CBC W/AUTO DIFF WBC: CPT

## 2025-05-30 PROCEDURE — 80053 COMPREHEN METABOLIC PANEL: CPT

## 2025-05-30 PROCEDURE — 6370000000 HC RX 637 (ALT 250 FOR IP): Performed by: NURSE PRACTITIONER

## 2025-05-30 PROCEDURE — 99285 EMERGENCY DEPT VISIT HI MDM: CPT

## 2025-05-30 PROCEDURE — 2500000003 HC RX 250 WO HCPCS: Performed by: PHYSICIAN ASSISTANT

## 2025-05-30 RX ORDER — ACETAMINOPHEN 325 MG/1
650 TABLET ORAL EVERY 6 HOURS PRN
Status: DISCONTINUED | OUTPATIENT
Start: 2025-05-30 | End: 2025-06-03 | Stop reason: HOSPADM

## 2025-05-30 RX ORDER — ONDANSETRON 4 MG/1
4 TABLET, ORALLY DISINTEGRATING ORAL EVERY 8 HOURS PRN
Status: DISCONTINUED | OUTPATIENT
Start: 2025-05-30 | End: 2025-06-03 | Stop reason: HOSPADM

## 2025-05-30 RX ORDER — POLYETHYLENE GLYCOL 3350 17 G/17G
17 POWDER, FOR SOLUTION ORAL DAILY PRN
Status: DISCONTINUED | OUTPATIENT
Start: 2025-05-30 | End: 2025-06-03 | Stop reason: HOSPADM

## 2025-05-30 RX ORDER — BUSPIRONE HYDROCHLORIDE 5 MG/1
5 TABLET ORAL 3 TIMES DAILY
Status: DISCONTINUED | OUTPATIENT
Start: 2025-05-30 | End: 2025-06-03 | Stop reason: HOSPADM

## 2025-05-30 RX ORDER — ACETAMINOPHEN 650 MG/1
650 SUPPOSITORY RECTAL EVERY 6 HOURS PRN
Status: DISCONTINUED | OUTPATIENT
Start: 2025-05-30 | End: 2025-06-03 | Stop reason: HOSPADM

## 2025-05-30 RX ORDER — BRIMONIDINE TARTRATE 2 MG/ML
1 SOLUTION/ DROPS OPHTHALMIC 2 TIMES DAILY
Status: DISCONTINUED | OUTPATIENT
Start: 2025-05-30 | End: 2025-06-03 | Stop reason: HOSPADM

## 2025-05-30 RX ORDER — MAGNESIUM SULFATE IN WATER 40 MG/ML
2000 INJECTION, SOLUTION INTRAVENOUS PRN
Status: DISCONTINUED | OUTPATIENT
Start: 2025-05-30 | End: 2025-06-03 | Stop reason: HOSPADM

## 2025-05-30 RX ORDER — TRANEXAMIC ACID 100 MG/ML
15 INJECTION, SOLUTION INTRAVENOUS ONCE
Status: DISCONTINUED | OUTPATIENT
Start: 2025-05-30 | End: 2025-05-30

## 2025-05-30 RX ORDER — SODIUM CHLORIDE 0.9 % (FLUSH) 0.9 %
5-40 SYRINGE (ML) INJECTION EVERY 12 HOURS SCHEDULED
Status: DISCONTINUED | OUTPATIENT
Start: 2025-05-30 | End: 2025-06-03 | Stop reason: HOSPADM

## 2025-05-30 RX ORDER — ATORVASTATIN CALCIUM 10 MG/1
10 TABLET, FILM COATED ORAL NIGHTLY
Status: DISCONTINUED | OUTPATIENT
Start: 2025-05-30 | End: 2025-06-03 | Stop reason: HOSPADM

## 2025-05-30 RX ORDER — POTASSIUM CHLORIDE 7.45 MG/ML
10 INJECTION INTRAVENOUS PRN
Status: DISCONTINUED | OUTPATIENT
Start: 2025-05-30 | End: 2025-06-03 | Stop reason: HOSPADM

## 2025-05-30 RX ORDER — LIDOCAINE HYDROCHLORIDE AND EPINEPHRINE 10; 10 MG/ML; UG/ML
20 INJECTION, SOLUTION INFILTRATION; PERINEURAL ONCE
Status: DISCONTINUED | OUTPATIENT
Start: 2025-05-30 | End: 2025-05-30

## 2025-05-30 RX ORDER — CETIRIZINE HYDROCHLORIDE 10 MG/1
10 TABLET ORAL DAILY
Status: DISCONTINUED | OUTPATIENT
Start: 2025-05-30 | End: 2025-06-03 | Stop reason: HOSPADM

## 2025-05-30 RX ORDER — SODIUM CHLORIDE 0.9 % (FLUSH) 0.9 %
5-40 SYRINGE (ML) INJECTION PRN
Status: DISCONTINUED | OUTPATIENT
Start: 2025-05-30 | End: 2025-06-03 | Stop reason: HOSPADM

## 2025-05-30 RX ORDER — SODIUM CHLORIDE 9 MG/ML
INJECTION, SOLUTION INTRAVENOUS PRN
Status: DISCONTINUED | OUTPATIENT
Start: 2025-05-30 | End: 2025-06-03 | Stop reason: HOSPADM

## 2025-05-30 RX ORDER — LISINOPRIL 20 MG/1
40 TABLET ORAL DAILY
Status: DISCONTINUED | OUTPATIENT
Start: 2025-05-31 | End: 2025-06-03 | Stop reason: HOSPADM

## 2025-05-30 RX ORDER — TRANEXAMIC ACID 100 MG/ML
15 INJECTION, SOLUTION INTRAVENOUS ONCE
Status: COMPLETED | OUTPATIENT
Start: 2025-05-30 | End: 2025-05-30

## 2025-05-30 RX ORDER — AMIODARONE HYDROCHLORIDE 200 MG/1
200 TABLET ORAL DAILY
Status: DISCONTINUED | OUTPATIENT
Start: 2025-05-31 | End: 2025-06-03 | Stop reason: HOSPADM

## 2025-05-30 RX ORDER — AMLODIPINE BESYLATE 10 MG/1
10 TABLET ORAL NIGHTLY
Status: DISCONTINUED | OUTPATIENT
Start: 2025-05-30 | End: 2025-06-03 | Stop reason: HOSPADM

## 2025-05-30 RX ORDER — POTASSIUM CHLORIDE 1500 MG/1
40 TABLET, EXTENDED RELEASE ORAL PRN
Status: DISCONTINUED | OUTPATIENT
Start: 2025-05-30 | End: 2025-06-03 | Stop reason: HOSPADM

## 2025-05-30 RX ORDER — ONDANSETRON 2 MG/ML
4 INJECTION INTRAMUSCULAR; INTRAVENOUS EVERY 6 HOURS PRN
Status: DISCONTINUED | OUTPATIENT
Start: 2025-05-30 | End: 2025-06-03 | Stop reason: HOSPADM

## 2025-05-30 RX ORDER — AMLODIPINE BESYLATE 10 MG/1
10 TABLET ORAL DAILY
Status: DISCONTINUED | OUTPATIENT
Start: 2025-05-31 | End: 2025-05-30

## 2025-05-30 RX ORDER — CARVEDILOL 12.5 MG/1
12.5 TABLET ORAL 2 TIMES DAILY WITH MEALS
Status: DISCONTINUED | OUTPATIENT
Start: 2025-05-30 | End: 2025-06-03 | Stop reason: HOSPADM

## 2025-05-30 RX ORDER — FENOFIBRATE 160 MG/1
160 TABLET ORAL DAILY
Status: DISCONTINUED | OUTPATIENT
Start: 2025-05-30 | End: 2025-06-03 | Stop reason: HOSPADM

## 2025-05-30 RX ADMIN — ATORVASTATIN CALCIUM 10 MG: 10 TABLET, FILM COATED ORAL at 21:02

## 2025-05-30 RX ADMIN — CARVEDILOL 12.5 MG: 12.5 TABLET, FILM COATED ORAL at 21:03

## 2025-05-30 RX ADMIN — PHENYLEPHRINE HYDROCHLORIDE 1 SPRAY: 0.5 SPRAY NASAL at 15:56

## 2025-05-30 RX ADMIN — TRANEXAMIC ACID 1068 MG: 100 INJECTION, SOLUTION INTRAVENOUS at 15:56

## 2025-05-30 RX ADMIN — IMMUNE GLOBULIN (HUMAN) 70 G: 10 INJECTION INTRAVENOUS; SUBCUTANEOUS at 20:54

## 2025-05-30 RX ADMIN — DEXAMETHASONE SODIUM PHOSPHATE 40 MG: 4 INJECTION INTRA-ARTICULAR; INTRALESIONAL; INTRAMUSCULAR; INTRAVENOUS; SOFT TISSUE at 18:20

## 2025-05-30 RX ADMIN — BUSPIRONE HYDROCHLORIDE 5 MG: 5 TABLET ORAL at 21:02

## 2025-05-30 RX ADMIN — BRIMONIDINE TARTRATE 1 DROP: 2 SOLUTION OPHTHALMIC at 21:04

## 2025-05-30 RX ADMIN — AMLODIPINE BESYLATE 10 MG: 10 TABLET ORAL at 22:07

## 2025-05-30 RX ADMIN — CETIRIZINE HYDROCHLORIDE 10 MG: 10 TABLET, FILM COATED ORAL at 21:03

## 2025-05-30 ASSESSMENT — PAIN - FUNCTIONAL ASSESSMENT: PAIN_FUNCTIONAL_ASSESSMENT: NONE - DENIES PAIN

## 2025-05-30 NOTE — ED NOTES
05/30/25 1749       Meds:  Medications   dexAMETHasone (DECADRON) 40 mg in sodium chloride 0.9 % 50 mL IVPB (has no administration in time range)   immune globulin (GAMUNEX-C) 10% solution 70 g (has no administration in time range)   phenylephrine (ARIANNE-SYNEPHRINE) 0.5 % nasal spray 1 spray (1 spray Each Nostril Given 5/30/25 1556)   tranexamic acid (CYKLOKAPRON) injection 1,068 mg (1,068 mg Nasal Given 5/30/25 1556)          Ambulatory Status:  No data recorded    Diagnosis:  DISPOSITION Admitted 05/30/2025 05:28:00 PM   Final diagnoses:   Epistaxis   Low platelet count            Assessment Abnormalities : (List)  Neuro: Confused   Yes[] No[x]    Respiratory : Labored  Yes[] No[x]  Lung Sounds Clear    Cardiac:   Regular  Yes[x] No[]  Irregular Yes[] No[]    RhythmNSR    :  Incontinent  Yes[] No[x]     Assessment Abnormalities none      Skin Assessment: Pale       Pain Score:  Pain Assessment  Pain Assessment: None - Denies Pain    ISOLATION Status  No active isolations    Labs:  Labs Reviewed   CBC WITH AUTO DIFFERENTIAL - Abnormal; Notable for the following components:       Result Value    Hemoglobin 13.2 (*)     Hematocrit 39.7 (*)     RDW 16.1 (*)     Platelets 6 (*)     MPV 13.5 (*)     Neutrophils % 68 (*)     Lymphocytes % 18 (*)     Eosinophils % 5 (*)     Lymphocytes Absolute 0.60 (*)     All other components within normal limits   COMPREHENSIVE METABOLIC PANEL - Abnormal; Notable for the following components:    Chloride 108 (*)     CO2 19 (*)     BUN 30 (*)     Creatinine 1.5 (*)     Est, Glom Filt Rate 48 (*)     Albumin/Globulin Ratio 0.8 (*)     All other components within normal limits             ALLERGIES     Asa [aspirin], Fish-derived products, Penicillins, and Tetracyclines & related    CURRENT MEDICATIONS       Previous Medications    AMIODARONE (CORDARONE) 200 MG TABLET    Take 1 tablet by mouth daily    AMLODIPINE (NORVASC) 10 MG TABLET    Take 1 tablet by mouth daily    ATORVASTATIN  No   []  Surgery  Completed  [] yes [] No   []  Urology  Completed  [] yes [] No   []  Plastics  Completed  [] yes [] No   []  ENT  Completed  [] yes [] No   [x]  Other: Oncology  Completed  [x] yes [] No      Treatment Team:   Treatment Team:   Rk Be MD Hammuda, Yusef A, Jr Guzmán, RN    Treatment:        Electronically signed by Jr Enriquez RN on 5/30/2025 at 5:57 PM

## 2025-05-30 NOTE — ED PROVIDER NOTES
[facility] EMERGENCY DEPARTMENT  eMERGENCY dEPARTMENT eNCOUnter      Pt Name: Bruno Morales  MRN: [id number]  Birthdate 1950  Date of evaluation: 5/30/2025  Provider: Zenon Diallo PA-C    CHIEF COMPLAINT       Chief Complaint   Patient presents with    Epistaxis     Patient states he started having a nose bleed in his left nare around 0530 this morning. Patient was able to clot it off this morning but then started eating and the nose bleed started again. Patient has hx of ITP.            HISTORY OF PRESENT ILLNESS  (Location/Symptom, Timing/Onset, Context/Setting, Quality, Duration, Modifying Factors, Severity.)   Bruno Morales is a 74 y.o. male who presents to the emergency department with wife c/o nosebleed from left nostril that started this am at 530AM.  Patient has a history of refractory ITP, he sees oncology here at [facility].  He recently had blood work yesterday which found low platelets.  He has not been on platelet therapy and different medications how to increase this with minimal relief.  He denies any dizziness, nausea or abdominal pain fevers or chills or any trauma.  He states that it stopped for about an hour or 2 and then started again when he tried eating breakfast..        Nursing Notes were reviewed.    REVIEW OF SYSTEMS    (2-9 systems for level 4, 10 or more for level 5)     Review of Systems   Nose bleed  Denies any chest pain, denies any dizziness    Except as noted above the remainder of the review of systems was reviewed and negative.       PAST MEDICAL HISTORY     Past Medical History:   Diagnosis Date    Hyperlipidemia     Hypertension     Thrombocytopenia      None otherwise stated in nurses notes    SURGICAL HISTORY       Past Surgical History:   Procedure Laterality Date    CT BIOPSY BONE MARROW  7/3/2024    CT BONE MARROW BIOPSY 7/3/2024 [facility] CT SCAN    TONSILLECTOMY       None otherwise stated in nurses notes    CURRENT MEDICATIONS       Previous Medications    
at this time  Figueroa Sarkar MD  05/30/25 [id number]       Figueroa Sarkar MD  05/30/25 0109

## 2025-05-30 NOTE — H&P
[facility]  Office: [phone]  Phani Andino, DO, Carlos A Hernandez, DO, Sandor Rivera DO, Dre Marroquin, DO, Cecelia Murillo MD, Rosie Robbins MD, Castro Hope MD, Becka Sharma MD,  Wolf Reardon MD, Edna Truong MD, Jamee Padilla MD,  Bia Weir DO, Jacob Riley MD, Rk Be MD, Diego Andino DO, Viviana Marroquin MD,  Jesse Mahan DO, Paula Ramsay MD, Alyssia Henley MD, Chari Pastor MD,  Reynold Lennon MD, Luca Eckert MD, Speedy Durham MD, Devyn Moreno MD, Heber Valenzuela MD, David Paiz MD, Eagle Whitt, DO, Maile Miranda MD, Peggy Villagran DO, Sebastian Braswell MD, Bia Griffith MD, Mohsin Reza, MD, Queta Yao MD, Shirley Waterhouse, CNP,  Darlyn Savage, CNP, Eagle Yoon, CNP,  Luzmaria Mitchell, DMITRIY, Tonja Vizcaino, CNP, Tiff Hamilton, CNP, Glenna Taylor, CNP, Lili Cool, CNP, Amina Harrell, PA-C, Letitia Lynch, CNP, Violette Romo, CNP,  Jonna Galvez, CNP, Viviana Ludwig, CNP, Hector Jackson, PA-C, Kelly Minor, CNP,  Lluvia Anand, CNS, Marilynn Acuña, CNP, Ashley Benítez, CNP,   Poppy Hughes, CNP         [facility]   IN-PATIENT SERVICE   [facility]    HISTORY AND PHYSICAL EXAMINATION            Date:   5/30/2025  Patient name:  Bruno Morales  Date of admission:  5/30/2025  2:50 PM  MRN:   [id number]  Account:  [id number]  YOB: 1950  PCP:    Margarette Vallejo MD  Room:   Fast Track/Fast Track  Code Status:    Prior      History Obtained From:     patient    History of Present Illness:     Bruno Morales is a 74 y.o. male with a past medical history of ITP who presented to the emergency department on 5/30/2025 complaining of epistaxis. The patient states that his symptoms began this morning and persisted throughout the day. In the ED, the patient was afebrile and nontoxic appearing. He was found to be severely thrombocytopenic with a platelet count of 6. Hematology was consulted and recommended admission for

## 2025-05-31 LAB
ANION GAP SERPL CALCULATED.3IONS-SCNC: 12 MMOL/L (ref 9–16)
BASOPHILS # BLD: 0 K/UL (ref 0–0.2)
BASOPHILS NFR BLD: 0 % (ref 0–2)
BUN SERPL-MCNC: 35 MG/DL (ref 8–23)
CALCIUM SERPL-MCNC: 8.3 MG/DL (ref 8.6–10.4)
CHLORIDE SERPL-SCNC: 106 MMOL/L (ref 98–107)
CO2 SERPL-SCNC: 18 MMOL/L (ref 20–31)
CREAT SERPL-MCNC: 1.5 MG/DL (ref 0.7–1.2)
EOSINOPHIL # BLD: 0 K/UL (ref 0–0.4)
EOSINOPHILS RELATIVE PERCENT: 0 % (ref 1–4)
ERYTHROCYTE [DISTWIDTH] IN BLOOD BY AUTOMATED COUNT: 15.8 % (ref 12.5–15.4)
GFR, ESTIMATED: 48 ML/MIN/1.73M2
GLUCOSE SERPL-MCNC: 148 MG/DL (ref 74–99)
HCT VFR BLD AUTO: 34.8 % (ref 41–53)
HGB BLD-MCNC: 11.5 G/DL (ref 13.5–17.5)
LYMPHOCYTES NFR BLD: 0.24 K/UL (ref 1–4.8)
LYMPHOCYTES RELATIVE PERCENT: 12 % (ref 24–44)
MCH RBC QN AUTO: 28.3 PG (ref 26–34)
MCHC RBC AUTO-ENTMCNC: 33 G/DL (ref 31–37)
MCV RBC AUTO: 85.7 FL (ref 80–100)
MONOCYTES NFR BLD: 0 % (ref 1–7)
MONOCYTES NFR BLD: 0 K/UL (ref 0.1–0.8)
MORPHOLOGY: NORMAL
NEUTROPHILS NFR BLD: 88 % (ref 36–66)
NEUTS SEG NFR BLD: 1.76 K/UL (ref 1.8–7.7)
PLATELET # BLD AUTO: 13 K/UL (ref 140–450)
PMV BLD AUTO: 11.1 FL (ref 6–12)
POTASSIUM SERPL-SCNC: 3.8 MMOL/L (ref 3.7–5.3)
RBC # BLD AUTO: 4.06 M/UL (ref 4.5–5.9)
SODIUM SERPL-SCNC: 136 MMOL/L (ref 136–145)
WBC OTHER # BLD: 2 K/UL (ref 3.5–11)

## 2025-05-31 PROCEDURE — 2580000003 HC RX 258: Performed by: STUDENT IN AN ORGANIZED HEALTH CARE EDUCATION/TRAINING PROGRAM

## 2025-05-31 PROCEDURE — 6360000002 HC RX W HCPCS: Performed by: STUDENT IN AN ORGANIZED HEALTH CARE EDUCATION/TRAINING PROGRAM

## 2025-05-31 PROCEDURE — 2060000000 HC ICU INTERMEDIATE R&B

## 2025-05-31 PROCEDURE — 80048 BASIC METABOLIC PNL TOTAL CA: CPT

## 2025-05-31 PROCEDURE — 6370000000 HC RX 637 (ALT 250 FOR IP): Performed by: NURSE PRACTITIONER

## 2025-05-31 PROCEDURE — 36415 COLL VENOUS BLD VENIPUNCTURE: CPT

## 2025-05-31 PROCEDURE — 85025 COMPLETE CBC W/AUTO DIFF WBC: CPT

## 2025-05-31 PROCEDURE — 2500000003 HC RX 250 WO HCPCS: Performed by: STUDENT IN AN ORGANIZED HEALTH CARE EDUCATION/TRAINING PROGRAM

## 2025-05-31 PROCEDURE — 6370000000 HC RX 637 (ALT 250 FOR IP): Performed by: STUDENT IN AN ORGANIZED HEALTH CARE EDUCATION/TRAINING PROGRAM

## 2025-05-31 PROCEDURE — 99232 SBSQ HOSP IP/OBS MODERATE 35: CPT | Performed by: STUDENT IN AN ORGANIZED HEALTH CARE EDUCATION/TRAINING PROGRAM

## 2025-05-31 RX ORDER — PANTOPRAZOLE SODIUM 40 MG/1
40 TABLET, DELAYED RELEASE ORAL
Status: DISCONTINUED | OUTPATIENT
Start: 2025-06-01 | End: 2025-06-03 | Stop reason: HOSPADM

## 2025-05-31 RX ADMIN — LISINOPRIL 40 MG: 20 TABLET ORAL at 09:44

## 2025-05-31 RX ADMIN — FENOFIBRATE 160 MG: 160 TABLET ORAL at 09:44

## 2025-05-31 RX ADMIN — CETIRIZINE HYDROCHLORIDE 10 MG: 10 TABLET, FILM COATED ORAL at 21:12

## 2025-05-31 RX ADMIN — AMLODIPINE BESYLATE 10 MG: 10 TABLET ORAL at 21:09

## 2025-05-31 RX ADMIN — ATORVASTATIN CALCIUM 10 MG: 10 TABLET, FILM COATED ORAL at 21:09

## 2025-05-31 RX ADMIN — BRIMONIDINE TARTRATE 1 DROP: 2 SOLUTION OPHTHALMIC at 21:10

## 2025-05-31 RX ADMIN — AMIODARONE HYDROCHLORIDE 200 MG: 200 TABLET ORAL at 09:44

## 2025-05-31 RX ADMIN — BUSPIRONE HYDROCHLORIDE 5 MG: 5 TABLET ORAL at 09:44

## 2025-05-31 RX ADMIN — IMMUNE GLOBULIN (HUMAN) 30000 MG: 10 INJECTION INTRAVENOUS; SUBCUTANEOUS at 16:29

## 2025-05-31 RX ADMIN — CARVEDILOL 12.5 MG: 12.5 TABLET, FILM COATED ORAL at 09:44

## 2025-05-31 RX ADMIN — CARVEDILOL 12.5 MG: 12.5 TABLET, FILM COATED ORAL at 21:12

## 2025-05-31 RX ADMIN — SODIUM CHLORIDE, PRESERVATIVE FREE 10 ML: 5 INJECTION INTRAVENOUS at 21:13

## 2025-05-31 RX ADMIN — BUSPIRONE HYDROCHLORIDE 5 MG: 5 TABLET ORAL at 21:09

## 2025-05-31 RX ADMIN — DEXAMETHASONE SODIUM PHOSPHATE 40 MG: 10 INJECTION INTRAMUSCULAR; INTRAVENOUS at 16:07

## 2025-05-31 RX ADMIN — SODIUM CHLORIDE, PRESERVATIVE FREE 10 ML: 5 INJECTION INTRAVENOUS at 08:15

## 2025-05-31 RX ADMIN — BRIMONIDINE TARTRATE 1 DROP: 2 SOLUTION OPHTHALMIC at 08:15

## 2025-05-31 NOTE — PROGRESS NOTES
[facility]  Office: [phone]  Phani Andino DO, Carlos A Hernandez, DO, Sandor Rivera DO, Dre Marroquin, DO, Cecelia Murillo MD, Rosie Robbins MD, Castro Hope MD, Becka Sharma MD,  Wolf Reardon MD, Edna Truong MD, Jamee Padilla MD,  Bia Weir DO, Jacob Riley MD, Rk Be MD, Diego Andino DO, Viviana Marroquin MD,  Jesse Mahan DO, Paula Ramsay MD, Alyssia Henley MD, Chari Pastor MD,  Reynold Lennon MD, Luca Eckert MD, Speedy Durham MD, Devyn Moreno MD, Heber Valenzuela MD, David Paiz MD, Eagle Whitt, DO, Maile Miranda MD, Peggy Villagran DO, Sebastian Braswell MD, Bia Griffith MD, Mohsin Reza, MD, Queta Yao MD, Shirley Waterhouse, CNP,  Darlyn Savage, CNP, Eagle Yoon, CNP,  Luzmaria Mitchell, DMITRIY, Tonja Vizcaino, CNP, Tiff Hamilton, CNP, Glenna Taylor, CNP, Lili Cool, CNP, Amina Harrell, PA-C, Letitia Lynch, CNP, Violette Romo, CNP,  Jonna Galvez, CNP, Viviana Ludwig, CNP, Hcetor Jackson, PA-C, Kelly Minor, CNP,  Lluvia Anand, CNS, Marilynn Acuña, CNP, Ashley Benítez, CNP,   Poppy Hughes, CNP         [facility]   IN-PATIENT SERVICE   [facility]    Progress Note    5/31/2025    6:40 AM    Name:   Bruno Morales  MRN:     [id number]     Acct:      [id number]   Room:   333/333-01  IP Day:  1  Admit Date:  5/30/2025  2:50 PM    PCP:   Margarette Vallejo MD  Code Status:  Full Code    Subjective:     Patient was seen in follow-up for ITP. He reports feeling well and has no specific complaints this morning. The patient's epistaxis has resolved and platelet count has improved to 13. Hematology is following; appreciate recommendations.     Medications:     Allergies:    Allergies   Allergen Reactions    Asa [Aspirin]     Fish-Derived Products     Penicillins     Tetracyclines & Related        Current Meds:   Scheduled Meds:    amiodarone  200 mg Oral Daily    atorvastatin  10 mg Oral Nightly    brimonidine  1 drop Left Eye BID

## 2025-05-31 NOTE — PLAN OF CARE
Problem: Discharge Planning  Goal: Discharge to home or other facility with appropriate resources  Outcome: Progressing  Flowsheets (Taken 5/30/2025 1857 by Lou Lancaster, RN)  Discharge to home or other facility with appropriate resources:   Identify barriers to discharge with patient and caregiver   Identify discharge learning needs (meds, wound care, etc)   Refer to discharge planning if patient needs post-hospital services based on physician order or complex needs related to functional status, cognitive ability or social support system   Arrange for needed discharge resources and transportation as appropriate     Problem: ABCDS Injury Assessment  Goal: Absence of physical injury  Outcome: Progressing  Flowsheets (Taken 5/31/2025 0220)  Absence of Physical Injury: Implement safety measures based on patient assessment     Problem: Safety - Adult  Goal: Free from fall injury  Outcome: Progressing  Flowsheets (Taken 5/31/2025 0220)  Free From Fall Injury: Instruct family/caregiver on patient safety     Problem: Musculoskeletal - Adult  Goal: Return mobility to safest level of function  Outcome: Progressing  Flowsheets (Taken 5/31/2025 0220)  Return Mobility to Safest Level of Function:   Assess patient stability and activity tolerance for standing, transferring and ambulating with or without assistive devices   Assist with transfers and ambulation using safe patient handling equipment as needed   Ensure adequate protection for wounds/incisions during mobilization   Obtain physical therapy/occupational therapy consults as needed     Problem: Hematologic - Adult  Goal: Maintains hematologic stability  Outcome: Progressing  Flowsheets (Taken 5/31/2025 0220)  Maintains hematologic stability:   Assess for signs and symptoms of bleeding or hemorrhage   Monitor labs for bleeding or clotting disorders   Administer blood products/factors as ordered

## 2025-05-31 NOTE — ACP (ADVANCE CARE PLANNING)
Advance Care Planning     Advance Care Planning Activator (Inpatient)  Conversation Note      Date of ACP Conversation: 5/31/2025     Conversation Conducted with: Patient with Decision Making Capacity    ACP Activator: Isabel Shi    Health Care Decision Maker:     Current Designated Health Care Decision Maker:     Primary Decision Maker: Kristine Morales - Spouse - [phone]    Secondary Decision Maker: Rob Ireland Other - [phone]  Click here to complete Healthcare Decision Makers including section of the Healthcare Decision Maker Relationship (ie \"Primary\")      Care Preferences    Ventilation:  \"If you were in your present state of health and suddenly became very ill and were unable to breathe on your own, what would your preference be about the use of a ventilator (breathing machine) if it were available to you?\"      Would the patient desire the use of ventilator (breathing machine)?: yes    \"If your health worsens and it becomes clear that your chance of recovery is unlikely, what would your preference be about the use of a ventilator (breathing machine) if it were available to you?\"     Would the patient desire the use of ventilator (breathing machine)?: Yes      Resuscitation  \"CPR works best to restart the heart when there is a sudden event, like a heart attack, in someone who is otherwise healthy. Unfortunately, CPR does not typically restart the heart for people who have serious health conditions or who are very sick.\"    \"In the event your heart stopped as a result of an underlying serious health condition, would you want attempts to be made to restart your heart (answer \"yes\" for attempt to resuscitate) or would you prefer a natural death (answer \"no\" for do not attempt to resuscitate)?\" yes           Conversation Outcomes:  ACP discussion completed and Existing advance directive reviewed with patient; no changes to patient's previously recorded wishes

## 2025-05-31 NOTE — CARE COORDINATION
Case Management Assessment  Initial Evaluation    Date/Time of Evaluation: 5/31/2025 5:03 PM  Assessment Completed by: Isabel Shi    If patient is discharged prior to next notation, then this note serves as note for discharge by case management.    Patient Name: Bruno Morales                   YOB: 1950  Diagnosis: Epistaxis [R04.0]  Acute idiopathic thrombocytopenic purpura (HCC) [D69.3]  Low platelet count [D69.6]                   Date / Time: 5/30/2025  2:50 PM    Patient Admission Status: Inpatient   Readmission Risk (Low < 19, Mod (19-27), High > 27): Readmission Risk Score: 23.2    Current PCP: Margarette Vallejo MD  PCP verified by CM? Yes    Chart Reviewed: Yes      History Provided by: Patient  Patient Orientation: Alert and Oriented    Patient Cognition: Alert    Hospitalization in the last 30 days (Readmission):  No    If yes, Readmission Assessment in CM Navigator will be completed.    Advance Directives:      Code Status: Full Code   Patient's Primary Decision Maker is: Legal Next of Kin (wife)    Primary Decision Maker: Kristine Morales - Spouse - [phone]    Secondary Decision Maker: Rob Ireland - Other - [phone]    Discharge Planning:    Patient lives with: Spouse/Significant Other Type of Home: House  Primary Care Giver: Self  Patient Support Systems include: Spouse/Significant Other, Children, [employer] [employer]   Current Financial resources: Medicare  Current community resources: None  Current services prior to admission: Durable Medical Equipment            Current DME: Walker            Type of Home Care services:  None    ADLS  Prior functional level: Independent in ADLs/IADLs, Assistance with the following:, Mobility (uses walker at times.)  Current functional level: Independent in ADLs/IADLs, Mobility (uses walker at times.)    PT AM-PAC:   /24  OT AM-PAC:   /24    Family can provide assistance at DC: Yes  Would you like Case Management to discuss the

## 2025-05-31 NOTE — CONSULTS
Today's Date:  6/1/2025  Patient Name: Bruno Morales  Date of admission: 5/30/2025  2:50 PM  Patient's age: 74 y.o., 1950  Admission Dx: Epistaxis [R04.0]  Acute idiopathic thrombocytopenic purpura (HCC) [D69.3]  Low platelet count [D69.6]    Reason for Consult: Recurrent resistant ITP  Requesting Physician: Rk Be MD    CHIEF COMPLAINT: Severe thrombocytopenia         HISTORY OF PRESENT ILLNESS:      The patient is a 74 y.o.  male who is admitted to the hospital for severe thrombocytopenia and nose bleeding, patient had history of ITP with no response initially to steroid and short response to IVIG back in June 2024 and recurred on rituximab quickly and currently on avatrombopag started on September 2024 with maximizing dose on February, then transitioned to Fostamatinib. Unfortunately without response.   Bone marrow was done earlier last year and was negative,   CT scan showed multiple and lymphadenopathy concerning for lymphomatous masses, especially in mediastinum and retroperitoneum    Pt received steroids and IVIG , platelets are responding.    Past Medical History:   has a past medical history of Hyperlipidemia, Hypertension, and Thrombocytopenia.    Past Surgical History:   has a past surgical history that includes Tonsillectomy and CT BIOPSY BONE MARROW (7/3/2024).     Medications:    Reviewed in Epic     Allergies:  Asa [aspirin], Fish-derived products, Penicillins, and Tetracyclines & related    Social History:   reports that he has never smoked. He has never used smokeless tobacco. He reports that he does not drink alcohol and does not use drugs.     Family History: family history includes Dementia in his mother; Parkinson's Disease in his father.    REVIEW OF SYSTEMS:    14 point review of system has been obtained unremarkable although it was mentioned above    PHYSICAL EXAM:      BP (!) 183/99   Pulse 93   Temp 97.9 °F (36.6 °C) (Oral)   Resp 19   Ht 1.778 m (5' 10\")   Wt  refractory ITP  Mediastinal lymphadenopathy  Retroperitoneal lymphadenopathy    RECOMMENDATIONS:  I reviewed the labs/imaging available to me,outside records and discussed with the patient.I explained to the patient the nature of this problem. I explained the significance of these abnormalities and possible etiology and management options   74-year-old man with recent diagnosis of ITP .  Has been through multiple therapy without any response  CT scan shows significant lymphadenopathy and lung nodules, however they have been stable over extended period of time  Agree with steroids and IVIG  Unfortunately, the patient will need splenectomy.  Case discussed with primary team.  Will consult general surgery.  Continue with IVIG, trying to get the platelets above 80,000  Case was discussed with cardiology.  Diagnostic cath is needed because of abnormal stress test.  Likely to do that tomorrow if her platelets above 80,000  Continue with oral steroid  Plan Nplate as outpatient even after splenectomy      Discussed with patient and Nurse.      Thank you for asking us to see this patient.                 Bia Jaime MD  Hematologist/Medical Oncologist  [facility]                          This note is created with the assistance of a speech recognition program.  While intending to generate a document that actually reflects the content of the visit, the document can still have some errors including those of syntax and sound a like substitutions which may escape proof reading.  It such instances, actual meaning can be extrapolated by contextual diversion.     Hematologist/Medical Oncologist

## 2025-06-01 PROBLEM — Z01.810 PREOP CARDIOVASCULAR EXAM: Status: ACTIVE | Noted: 2025-06-01

## 2025-06-01 PROBLEM — R04.0 EPISTAXIS: Status: ACTIVE | Noted: 2025-06-01

## 2025-06-01 PROBLEM — R94.39 ABNORMAL STRESS TEST: Status: ACTIVE | Noted: 2025-06-01

## 2025-06-01 LAB
ANION GAP SERPL CALCULATED.3IONS-SCNC: 13 MMOL/L (ref 9–16)
BASOPHILS # BLD: 0 K/UL (ref 0–0.2)
BASOPHILS NFR BLD: 0 % (ref 0–2)
BUN SERPL-MCNC: 50 MG/DL (ref 8–23)
CALCIUM SERPL-MCNC: 8.8 MG/DL (ref 8.6–10.4)
CHLORIDE SERPL-SCNC: 107 MMOL/L (ref 98–107)
CO2 SERPL-SCNC: 17 MMOL/L (ref 20–31)
CREAT SERPL-MCNC: 1.8 MG/DL (ref 0.7–1.2)
EOSINOPHIL # BLD: 0 K/UL (ref 0–0.4)
EOSINOPHILS RELATIVE PERCENT: 0 % (ref 1–4)
ERYTHROCYTE [DISTWIDTH] IN BLOOD BY AUTOMATED COUNT: 16.3 % (ref 12.5–15.4)
GFR, ESTIMATED: 39 ML/MIN/1.73M2
GLUCOSE SERPL-MCNC: 159 MG/DL (ref 74–99)
HCT VFR BLD AUTO: 31.8 % (ref 41–53)
HGB BLD-MCNC: 10.6 G/DL (ref 13.5–17.5)
LYMPHOCYTES NFR BLD: 0.28 K/UL (ref 1–4.8)
LYMPHOCYTES RELATIVE PERCENT: 7 % (ref 24–44)
MCH RBC QN AUTO: 28.5 PG (ref 26–34)
MCHC RBC AUTO-ENTMCNC: 33.4 G/DL (ref 31–37)
MCV RBC AUTO: 85.4 FL (ref 80–100)
MONOCYTES NFR BLD: 0.12 K/UL (ref 0.1–1.2)
MONOCYTES NFR BLD: 3 % (ref 2–11)
MORPHOLOGY: NORMAL
NEUTROPHILS NFR BLD: 90 % (ref 36–66)
NEUTS SEG NFR BLD: 3.6 K/UL (ref 1.8–7.7)
PLATELET # BLD AUTO: 64 K/UL (ref 140–450)
PMV BLD AUTO: 10.6 FL (ref 6–12)
POTASSIUM SERPL-SCNC: 4.2 MMOL/L (ref 3.7–5.3)
RBC # BLD AUTO: 3.72 M/UL (ref 4.5–5.9)
SODIUM SERPL-SCNC: 137 MMOL/L (ref 136–145)
WBC OTHER # BLD: 4 K/UL (ref 3.5–11)

## 2025-06-01 PROCEDURE — 6370000000 HC RX 637 (ALT 250 FOR IP): Performed by: STUDENT IN AN ORGANIZED HEALTH CARE EDUCATION/TRAINING PROGRAM

## 2025-06-01 PROCEDURE — 1200000000 HC SEMI PRIVATE

## 2025-06-01 PROCEDURE — 80048 BASIC METABOLIC PNL TOTAL CA: CPT

## 2025-06-01 PROCEDURE — 85025 COMPLETE CBC W/AUTO DIFF WBC: CPT

## 2025-06-01 PROCEDURE — 6370000000 HC RX 637 (ALT 250 FOR IP): Performed by: NURSE PRACTITIONER

## 2025-06-01 PROCEDURE — 6360000002 HC RX W HCPCS: Performed by: STUDENT IN AN ORGANIZED HEALTH CARE EDUCATION/TRAINING PROGRAM

## 2025-06-01 PROCEDURE — 99223 1ST HOSP IP/OBS HIGH 75: CPT | Performed by: INTERNAL MEDICINE

## 2025-06-01 PROCEDURE — 2500000003 HC RX 250 WO HCPCS: Performed by: STUDENT IN AN ORGANIZED HEALTH CARE EDUCATION/TRAINING PROGRAM

## 2025-06-01 PROCEDURE — 99232 SBSQ HOSP IP/OBS MODERATE 35: CPT | Performed by: STUDENT IN AN ORGANIZED HEALTH CARE EDUCATION/TRAINING PROGRAM

## 2025-06-01 PROCEDURE — 2580000003 HC RX 258: Performed by: STUDENT IN AN ORGANIZED HEALTH CARE EDUCATION/TRAINING PROGRAM

## 2025-06-01 PROCEDURE — 36415 COLL VENOUS BLD VENIPUNCTURE: CPT

## 2025-06-01 PROCEDURE — 99222 1ST HOSP IP/OBS MODERATE 55: CPT | Performed by: INTERNAL MEDICINE

## 2025-06-01 RX ORDER — SODIUM CHLORIDE 9 MG/ML
INJECTION, SOLUTION INTRAVENOUS CONTINUOUS
Status: DISCONTINUED | OUTPATIENT
Start: 2025-06-01 | End: 2025-06-03 | Stop reason: HOSPADM

## 2025-06-01 RX ADMIN — CETIRIZINE HYDROCHLORIDE 10 MG: 10 TABLET, FILM COATED ORAL at 22:07

## 2025-06-01 RX ADMIN — BUSPIRONE HYDROCHLORIDE 5 MG: 5 TABLET ORAL at 21:57

## 2025-06-01 RX ADMIN — PANTOPRAZOLE SODIUM 40 MG: 40 TABLET, DELAYED RELEASE ORAL at 06:45

## 2025-06-01 RX ADMIN — SODIUM CHLORIDE, PRESERVATIVE FREE 10 ML: 5 INJECTION INTRAVENOUS at 21:57

## 2025-06-01 RX ADMIN — LISINOPRIL 40 MG: 20 TABLET ORAL at 09:41

## 2025-06-01 RX ADMIN — BRIMONIDINE TARTRATE 1 DROP: 2 SOLUTION OPHTHALMIC at 09:44

## 2025-06-01 RX ADMIN — ATORVASTATIN CALCIUM 10 MG: 10 TABLET, FILM COATED ORAL at 21:57

## 2025-06-01 RX ADMIN — BUSPIRONE HYDROCHLORIDE 5 MG: 5 TABLET ORAL at 09:40

## 2025-06-01 RX ADMIN — IMMUNE GLOBULIN (HUMAN) 30000 MG: 10 INJECTION INTRAVENOUS; SUBCUTANEOUS at 19:31

## 2025-06-01 RX ADMIN — DEXAMETHASONE SODIUM PHOSPHATE 40 MG: 10 INJECTION INTRAMUSCULAR; INTRAVENOUS at 16:48

## 2025-06-01 RX ADMIN — SODIUM CHLORIDE: 0.9 INJECTION, SOLUTION INTRAVENOUS at 20:24

## 2025-06-01 RX ADMIN — SODIUM CHLORIDE, PRESERVATIVE FREE 10 ML: 5 INJECTION INTRAVENOUS at 09:41

## 2025-06-01 RX ADMIN — AMIODARONE HYDROCHLORIDE 200 MG: 200 TABLET ORAL at 09:40

## 2025-06-01 RX ADMIN — CARVEDILOL 12.5 MG: 12.5 TABLET, FILM COATED ORAL at 09:40

## 2025-06-01 RX ADMIN — IMMUNE GLOBULIN (HUMAN) 30000 MG: 10 INJECTION INTRAVENOUS; SUBCUTANEOUS at 17:10

## 2025-06-01 RX ADMIN — FENOFIBRATE 160 MG: 160 TABLET ORAL at 09:41

## 2025-06-01 RX ADMIN — AMLODIPINE BESYLATE 10 MG: 10 TABLET ORAL at 21:57

## 2025-06-01 RX ADMIN — BRIMONIDINE TARTRATE 1 DROP: 2 SOLUTION OPHTHALMIC at 22:04

## 2025-06-01 NOTE — PROGRESS NOTES
[facility]  Office: [phone]  Phani Andino DO, Carlos A Henrandez, DO, Sandor Rivera DO, Dre Marroquin, DO, Cecelia Murillo MD, Rosie Robbins MD, Castro Hope MD, Becka Sharma MD,  Wolf Reardon MD, Edna Truong MD, Jamee Padilla MD,  Bia Weir DO, Jacob Riley MD, Rk Be MD, Diego Andino DO, Viviana Marroquin MD,  Jesse Mahan DO, Paula Ramsay MD, Alyssia Henley MD, Chari Pastor MD,  Reynold Lennon MD, Luca Eckert MD, Speedy Durham MD, Devyn Moreno MD, Heber Valenzuela MD, David Paiz MD, Eagel Whitt, DO, Maile Miranda MD, Peggy Villagran DO, Sebastian Braswell MD, Bia Griffith MD, Mohsin Reza, MD, Queta Yao MD, Shirley Waterhouse, CNP,  Darlyn Savage, CNP, Eagle Yoon, CNP,  Luzmaria Mitchell, DMITRIY, Tonja Vizcaino, CNP, Tiff Hamilton, CNP, Glenna Taylor, CNP, Lili Cool, CNP, Amina Harrell, PA-C, Letitia Lynch, CNP, Violette Romo, CNP,  Jonna Galvez, CNP, Viviana Ludwig, CNP, Hector Jackson, PA-C, Kelly Minor, CNP,  Lluvia Anand, CNS, Marilynn Acuña, CNP, Ashley Benítez, CNP,   Poppy Hughes, CNP         [facility]   IN-PATIENT SERVICE   [facility]    Progress Note    6/1/2025    9:46 AM    Name:   Bruno Morales  MRN:     [id number]     Acct:      [id number]   Room:   333/333-01  IP Day:  2  Admit Date:  5/30/2025  2:50 PM    PCP:   Margarette Vallejo MD  Code Status:  Full Code    Subjective:     Patient was seen in follow-up for ITP. He reports feeling well and has no complaints this morning. Platelet count has improved to 64. Anticipate discharge tomorrow.     Medications:     Allergies:    Allergies   Allergen Reactions    Asa [Aspirin]     Fish-Derived Products     Penicillins     Tetracyclines & Related        Current Meds:   Scheduled Meds:    pantoprazole  40 mg Oral QAM AC    amiodarone  200 mg Oral Daily    atorvastatin  10 mg Oral Nightly    brimonidine  1 drop Left Eye BID    busPIRone  5 mg Oral TID     \"[id number]\", \"[id number]\", \"O2SAT\", \"FIO2\"  Lab Results   Component Value Date/Time    SPECIAL LEFT ARM 20ML 11/14/2024 05:39 PM     Lab Results   Component Value Date/Time    CULTURE NO GROWTH 5 DAYS 11/14/2024 05:39 PM       Radiology:  No results found.    Physical Examination:     General appearance:  alert, cooperative and no distress  Mental Status:  oriented to person, place and time and normal affect  Lungs:  clear to auscultation bilaterally, normal effort  Heart:  regular rate and rhythm, no murmur  Abdomen:  soft, nontender, nondistended, normal bowel sounds, no masses, hepatomegaly, splenomegaly  Extremities:  no edema, redness, tenderness in the calves  Skin:  Scattered ecchymoses present.     Assessment:     Hospital Problems           Last Modified POA    * (Principal) Acute idiopathic thrombocytopenic purpura (HCC) 5/30/2025 Yes    Primary hypertension 5/30/2025 Yes    Pure hypercholesterolemia 5/30/2025 Yes    CKD stage 3b, GFR 30-44 ml/min ([facility]) 5/30/2025 Yes    PVC (premature ventricular contraction) 5/30/2025 Yes       Plan:     ITP   -Platelet count has improved to 64   -Dexamethasone 40 mg daily   -IVIG   -Continue home Tavalisse   -Hematology is following; appreciate recommendations   -Daily CBC    Epistaxis   -Resolved       Frequent PVC   -Continue home amiodarone      HTN   -Continue home amlodipine and lisinopril      HLD   -Continue home atorvastatin       Rk Be MD  6/1/2025  9:46 AM

## 2025-06-01 NOTE — PROGRESS NOTES
Discussed case with hematology and general surgery. The patient has treatment resistant ITP and requires splenectomy. This should be done as an inpatient to ensure platelet counts remain stable. Plan for inpatient splenectomy this week.

## 2025-06-01 NOTE — PROGRESS NOTES
Discussed case with cardiology. The patient will need a cardiac cath prior to proceeding with surgery. Nephrology has been consulted for clearance.    Recover from

## 2025-06-01 NOTE — PROGRESS NOTES
Spiritual Health History and Assessment/Progress Note  [facility] [place]    (P) Spiritual/Emotional Needs,  , (P) Life Adjustments, Adjustment to illness,      Name: Bruno Morales MRN: [id number]    Age: 74 y.o.     Sex: male   Language: English   Gnosticism: Non-Faith   Acute idiopathic thrombocytopenic purpura (HCC)     Date: 5/31/2025            Total Time Calculated: (P) 35 min              Spiritual Assessment began in [facility]        Referral/Consult From: (P) Rounding   Encounter Overview/Reason: (P) Spiritual/Emotional Needs  Service Provided For: (P) Patient        provided pastoral care and support. Pt. Was here before and continued conversation. No family was present during visit. Pt. Was welcoming  and open to meaningful conversation.  offered active listening and pastoral encouragement. Provided calmness and empathic listening during conversation.. Good visit.    Elodia, Belief, Meaning:   Patient has beliefs or practices that help with coping during difficult times  Family/Friends No family/friends present      Importance and Influence:  Patient has spiritual/personal beliefs that influence decisions regarding their health  Family/Friends No family/friends present    Community:  Patient feels well-supported. Support system includes: Spouse/Partner  Family/Friends feel well-supported. Support system includes: Spouse/Partner    Assessment and Plan of Care:     Patient Interventions include: Facilitated expression of thoughts and feelings, Explored spiritual coping/struggle/distress, and Facilitated life review and/ or legacy  Family/Friends Interventions include: No family/friends present    Patient Plan of Care: Spiritual Care available upon further referral  Family/Friends Plan of Care: No family/friends present    Electronically signed by Chaplain DREW on 5/31/2025 at 8:53 PM    05/31/25 2050   Encounter Summary   Encounter Overview/Reason  Spiritual/Emotional Needs   Service Provided For Patient   Referral/Consult From [facility]   Support System Spouse   Last Encounter  05/31/25   Complexity of Encounter Moderate   Begin Time 1840   End Time  1915   Total Time Calculated 35 min   Spiritual/Emotional needs   Type Spiritual Support   Grief, Loss, and Adjustments   Type Life Adjustments;Adjustment to illness   Assessment/Intervention/Outcome   Assessment Calm;Coping;Hopeful   Intervention Sustaining Presence/Ministry of presence;Nurtured Hope;Explored/Affirmed feelings, thoughts, concerns;Discussed meaning/purpose;Active listening   Outcome Comfort;Coping;Encouraged;Engaged in conversation;Expressed feelings, needs, and concerns;Expressed Gratitude   Plan and Referrals   Plan/Referrals Continue to visit, (comment);Continue Support (comment)

## 2025-06-01 NOTE — PLAN OF CARE
Problem: Discharge Planning  Goal: Discharge to home or other facility with appropriate resources  6/1/2025 1211 by Yoel Reed RN  Outcome: Progressing  Flowsheets (Taken 6/1/2025 0800)  Discharge to home or other facility with appropriate resources: Identify barriers to discharge with patient and caregiver  6/1/2025 0621 by Oppenheim, Evan, RN  Outcome: Progressing  Flowsheets (Taken 5/31/2025 2006)  Discharge to home or other facility with appropriate resources: Identify barriers to discharge with patient and caregiver     Problem: ABCDS Injury Assessment  Goal: Absence of physical injury  6/1/2025 1211 by Yoel Reed RN  Outcome: Progressing  6/1/2025 0621 by Oppenheim, Evan, RN  Outcome: Progressing     Problem: Safety - Adult  Goal: Free from fall injury  6/1/2025 1211 by Yeol Reed RN  Outcome: Progressing  6/1/2025 0621 by Oppenheim, Evan, RN  Outcome: Progressing     Problem: Musculoskeletal - Adult  Goal: Return mobility to safest level of function  6/1/2025 1211 by Yoel Reed RN  Outcome: Progressing  Flowsheets (Taken 6/1/2025 0800)  Return Mobility to Safest Level of Function: Assess patient stability and activity tolerance for standing, transferring and ambulating with or without assistive devices  6/1/2025 0621 by Oppenheim, Evan, RN  Outcome: Progressing  Flowsheets (Taken 5/31/2025 2006)  Return Mobility to Safest Level of Function: Assess patient stability and activity tolerance for standing, transferring and ambulating with or without assistive devices     Problem: Hematologic - Adult  Goal: Maintains hematologic stability  6/1/2025 1211 by Yoel Reed RN  Outcome: Progressing  Flowsheets (Taken 6/1/2025 0800)  Maintains hematologic stability: Assess for signs and symptoms of bleeding or hemorrhage  6/1/2025 0621 by Oppenheim, Evan, RN  Outcome: Progressing  Flowsheets (Taken 5/31/2025 2006)  Maintains hematologic stability: Assess for signs and symptoms of bleeding or  hemorrhage

## 2025-06-01 NOTE — PLAN OF CARE
Problem: Discharge Planning  Goal: Discharge to home or other facility with appropriate resources  Outcome: Progressing  Flowsheets (Taken 5/31/2025 2006)  Discharge to home or other facility with appropriate resources: Identify barriers to discharge with patient and caregiver     Problem: ABCDS Injury Assessment  Goal: Absence of physical injury  Outcome: Progressing     Problem: Safety - Adult  Goal: Free from fall injury  Outcome: Progressing     Problem: Musculoskeletal - Adult  Goal: Return mobility to safest level of function  Outcome: Progressing  Flowsheets (Taken 5/31/2025 2006)  Return Mobility to Safest Level of Function: Assess patient stability and activity tolerance for standing, transferring and ambulating with or without assistive devices     Problem: Hematologic - Adult  Goal: Maintains hematologic stability  Outcome: Progressing  Flowsheets (Taken 5/31/2025 2006)  Maintains hematologic stability: Assess for signs and symptoms of bleeding or hemorrhage

## 2025-06-02 ENCOUNTER — APPOINTMENT (OUTPATIENT)
Dept: ULTRASOUND IMAGING | Age: 75
DRG: 800 | End: 2025-06-02
Attending: INTERNAL MEDICINE
Payer: MEDICARE

## 2025-06-02 PROBLEM — Z86.2 HISTORY OF ITP: Status: ACTIVE | Noted: 2025-06-02

## 2025-06-02 LAB
BACTERIA URNS QL MICRO: ABNORMAL
BASOPHILS # BLD: 0 K/UL (ref 0–0.2)
BASOPHILS NFR BLD: 0 % (ref 0–2)
BILIRUB UR QL STRIP: NEGATIVE
C3 SERPL-MCNC: 136 MG/DL (ref 90–180)
C4 SERPL-MCNC: 5 MG/DL (ref 10–40)
CLARITY UR: CLEAR
COLOR UR: YELLOW
CREAT UR-MCNC: 79.5 MG/DL (ref 39–259)
EOSINOPHIL # BLD: 0 K/UL (ref 0–0.4)
EOSINOPHIL,URINE: NORMAL
EOSINOPHILS RELATIVE PERCENT: 0 % (ref 1–4)
EPI CELLS #/AREA URNS HPF: ABNORMAL /HPF (ref 0–5)
ERYTHROCYTE [DISTWIDTH] IN BLOOD BY AUTOMATED COUNT: 16.8 % (ref 12.5–15.4)
GLUCOSE BLD-MCNC: 123 MG/DL (ref 75–110)
GLUCOSE UR STRIP-MCNC: NEGATIVE MG/DL
HCT VFR BLD AUTO: 33.3 % (ref 41–53)
HGB BLD-MCNC: 11 G/DL (ref 13.5–17.5)
HGB UR QL STRIP.AUTO: NEGATIVE
KETONES UR STRIP-MCNC: NEGATIVE MG/DL
LEUKOCYTE ESTERASE UR QL STRIP: NEGATIVE
LYMPHOCYTES NFR BLD: 0.17 K/UL (ref 1–4.8)
LYMPHOCYTES RELATIVE PERCENT: 3 % (ref 24–44)
MCH RBC QN AUTO: 28.5 PG (ref 26–34)
MCHC RBC AUTO-ENTMCNC: 33.1 G/DL (ref 31–37)
MCV RBC AUTO: 86.1 FL (ref 80–100)
MONOCYTES NFR BLD: 0.17 K/UL (ref 0.1–0.8)
MONOCYTES NFR BLD: 3 % (ref 1–7)
MORPHOLOGY: NORMAL
NEUTROPHILS NFR BLD: 94 % (ref 36–66)
NEUTS SEG NFR BLD: 5.46 K/UL (ref 1.8–7.7)
NITRITE UR QL STRIP: NEGATIVE
PH UR STRIP: 6 [PH] (ref 5–8)
PLATELET # BLD AUTO: 131 K/UL (ref 140–450)
PMV BLD AUTO: 9.6 FL (ref 6–12)
PROT UR STRIP-MCNC: ABNORMAL MG/DL
RBC # BLD AUTO: 3.87 M/UL (ref 4.5–5.9)
RBC #/AREA URNS HPF: ABNORMAL /HPF (ref 0–2)
SP GR UR STRIP: 1.02 (ref 1–1.03)
TOTAL PROTEIN, URINE: 37 MG/DL
UROBILINOGEN UR STRIP-ACNC: NORMAL EU/DL (ref 0–1)
WBC #/AREA URNS HPF: ABNORMAL /HPF (ref 0–5)
WBC OTHER # BLD: 5.8 K/UL (ref 3.5–11)

## 2025-06-02 PROCEDURE — 6370000000 HC RX 637 (ALT 250 FOR IP): Performed by: STUDENT IN AN ORGANIZED HEALTH CARE EDUCATION/TRAINING PROGRAM

## 2025-06-02 PROCEDURE — 76770 US EXAM ABDO BACK WALL COMP: CPT

## 2025-06-02 PROCEDURE — 51798 US URINE CAPACITY MEASURE: CPT

## 2025-06-02 PROCEDURE — 99222 1ST HOSP IP/OBS MODERATE 55: CPT | Performed by: INTERNAL MEDICINE

## 2025-06-02 PROCEDURE — 2060000000 HC ICU INTERMEDIATE R&B

## 2025-06-02 PROCEDURE — 6360000002 HC RX W HCPCS: Performed by: STUDENT IN AN ORGANIZED HEALTH CARE EDUCATION/TRAINING PROGRAM

## 2025-06-02 PROCEDURE — 2709999900 HC NON-CHARGEABLE SUPPLY: Performed by: INTERNAL MEDICINE

## 2025-06-02 PROCEDURE — 82570 ASSAY OF URINE CREATININE: CPT

## 2025-06-02 PROCEDURE — 86038 ANTINUCLEAR ANTIBODIES: CPT

## 2025-06-02 PROCEDURE — C1769 GUIDE WIRE: HCPCS | Performed by: INTERNAL MEDICINE

## 2025-06-02 PROCEDURE — 99152 MOD SED SAME PHYS/QHP 5/>YRS: CPT | Performed by: INTERNAL MEDICINE

## 2025-06-02 PROCEDURE — 36415 COLL VENOUS BLD VENIPUNCTURE: CPT

## 2025-06-02 PROCEDURE — 6360000002 HC RX W HCPCS: Performed by: INTERNAL MEDICINE

## 2025-06-02 PROCEDURE — 81001 URINALYSIS AUTO W/SCOPE: CPT

## 2025-06-02 PROCEDURE — 6360000004 HC RX CONTRAST MEDICATION: Performed by: INTERNAL MEDICINE

## 2025-06-02 PROCEDURE — 84156 ASSAY OF PROTEIN URINE: CPT

## 2025-06-02 PROCEDURE — 87205 SMEAR GRAM STAIN: CPT

## 2025-06-02 PROCEDURE — 2500000003 HC RX 250 WO HCPCS: Performed by: STUDENT IN AN ORGANIZED HEALTH CARE EDUCATION/TRAINING PROGRAM

## 2025-06-02 PROCEDURE — 4A023N7 MEASUREMENT OF CARDIAC SAMPLING AND PRESSURE, LEFT HEART, PERCUTANEOUS APPROACH: ICD-10-PCS | Performed by: INTERNAL MEDICINE

## 2025-06-02 PROCEDURE — 86225 DNA ANTIBODY NATIVE: CPT

## 2025-06-02 PROCEDURE — 86160 COMPLEMENT ANTIGEN: CPT

## 2025-06-02 PROCEDURE — 2580000003 HC RX 258: Performed by: STUDENT IN AN ORGANIZED HEALTH CARE EDUCATION/TRAINING PROGRAM

## 2025-06-02 PROCEDURE — 2500000003 HC RX 250 WO HCPCS: Performed by: INTERNAL MEDICINE

## 2025-06-02 PROCEDURE — 93452 LEFT HRT CATH W/VENTRCLGRPHY: CPT | Performed by: INTERNAL MEDICINE

## 2025-06-02 PROCEDURE — 82947 ASSAY GLUCOSE BLOOD QUANT: CPT

## 2025-06-02 PROCEDURE — 93458 L HRT ARTERY/VENTRICLE ANGIO: CPT | Performed by: INTERNAL MEDICINE

## 2025-06-02 PROCEDURE — 6370000000 HC RX 637 (ALT 250 FOR IP): Performed by: NURSE PRACTITIONER

## 2025-06-02 PROCEDURE — 85025 COMPLETE CBC W/AUTO DIFF WBC: CPT

## 2025-06-02 PROCEDURE — 99232 SBSQ HOSP IP/OBS MODERATE 35: CPT | Performed by: STUDENT IN AN ORGANIZED HEALTH CARE EDUCATION/TRAINING PROGRAM

## 2025-06-02 PROCEDURE — B2111ZZ FLUOROSCOPY OF MULTIPLE CORONARY ARTERIES USING LOW OSMOLAR CONTRAST: ICD-10-PCS | Performed by: INTERNAL MEDICINE

## 2025-06-02 PROCEDURE — C1894 INTRO/SHEATH, NON-LASER: HCPCS | Performed by: INTERNAL MEDICINE

## 2025-06-02 PROCEDURE — 99232 SBSQ HOSP IP/OBS MODERATE 35: CPT | Performed by: INTERNAL MEDICINE

## 2025-06-02 RX ORDER — HYDRALAZINE HYDROCHLORIDE 20 MG/ML
5 INJECTION INTRAMUSCULAR; INTRAVENOUS EVERY 6 HOURS PRN
Status: DISCONTINUED | OUTPATIENT
Start: 2025-06-02 | End: 2025-06-03 | Stop reason: HOSPADM

## 2025-06-02 RX ORDER — SODIUM CHLORIDE 0.9 % (FLUSH) 0.9 %
5-40 SYRINGE (ML) INJECTION EVERY 12 HOURS SCHEDULED
Status: CANCELLED | OUTPATIENT
Start: 2025-06-02

## 2025-06-02 RX ORDER — ACETAMINOPHEN 325 MG/1
650 TABLET ORAL EVERY 4 HOURS PRN
Status: CANCELLED | OUTPATIENT
Start: 2025-06-02

## 2025-06-02 RX ORDER — MIDAZOLAM 1 MG/ML
INJECTION INTRAMUSCULAR; INTRAVENOUS PRN
Status: DISCONTINUED | OUTPATIENT
Start: 2025-06-02 | End: 2025-06-02 | Stop reason: HOSPADM

## 2025-06-02 RX ORDER — SODIUM CHLORIDE 9 MG/ML
INJECTION, SOLUTION INTRAVENOUS CONTINUOUS
Status: CANCELLED | OUTPATIENT
Start: 2025-06-02 | End: 2025-06-02

## 2025-06-02 RX ORDER — LIDOCAINE HYDROCHLORIDE 10 MG/ML
INJECTION, SOLUTION INFILTRATION; PERINEURAL PRN
Status: DISCONTINUED | OUTPATIENT
Start: 2025-06-02 | End: 2025-06-02 | Stop reason: HOSPADM

## 2025-06-02 RX ORDER — SODIUM CHLORIDE 0.9 % (FLUSH) 0.9 %
5-40 SYRINGE (ML) INJECTION PRN
Status: CANCELLED | OUTPATIENT
Start: 2025-06-02

## 2025-06-02 RX ORDER — IOPAMIDOL 755 MG/ML
INJECTION, SOLUTION INTRAVASCULAR PRN
Status: DISCONTINUED | OUTPATIENT
Start: 2025-06-02 | End: 2025-06-02 | Stop reason: HOSPADM

## 2025-06-02 RX ORDER — SODIUM CHLORIDE 9 MG/ML
INJECTION, SOLUTION INTRAVENOUS PRN
Status: CANCELLED | OUTPATIENT
Start: 2025-06-02

## 2025-06-02 RX ADMIN — PANTOPRAZOLE SODIUM 40 MG: 40 TABLET, DELAYED RELEASE ORAL at 05:24

## 2025-06-02 RX ADMIN — HYDRALAZINE HYDROCHLORIDE 5 MG: 20 INJECTION, SOLUTION INTRAMUSCULAR; INTRAVENOUS at 05:24

## 2025-06-02 RX ADMIN — SODIUM CHLORIDE: 0.9 INJECTION, SOLUTION INTRAVENOUS at 15:37

## 2025-06-02 RX ADMIN — BRIMONIDINE TARTRATE 1 DROP: 2 SOLUTION OPHTHALMIC at 10:20

## 2025-06-02 RX ADMIN — BUSPIRONE HYDROCHLORIDE 5 MG: 5 TABLET ORAL at 09:39

## 2025-06-02 RX ADMIN — BRIMONIDINE TARTRATE 1 DROP: 2 SOLUTION OPHTHALMIC at 21:02

## 2025-06-02 RX ADMIN — BUSPIRONE HYDROCHLORIDE 5 MG: 5 TABLET ORAL at 15:15

## 2025-06-02 RX ADMIN — CARVEDILOL 12.5 MG: 12.5 TABLET, FILM COATED ORAL at 09:30

## 2025-06-02 RX ADMIN — ATORVASTATIN CALCIUM 10 MG: 10 TABLET, FILM COATED ORAL at 21:02

## 2025-06-02 RX ADMIN — SODIUM CHLORIDE: 0.9 INJECTION, SOLUTION INTRAVENOUS at 13:36

## 2025-06-02 RX ADMIN — AMIODARONE HYDROCHLORIDE 200 MG: 200 TABLET ORAL at 09:39

## 2025-06-02 RX ADMIN — AMLODIPINE BESYLATE 10 MG: 10 TABLET ORAL at 21:01

## 2025-06-02 RX ADMIN — BUSPIRONE HYDROCHLORIDE 5 MG: 5 TABLET ORAL at 21:02

## 2025-06-02 RX ADMIN — SODIUM CHLORIDE, PRESERVATIVE FREE 10 ML: 5 INJECTION INTRAVENOUS at 21:09

## 2025-06-02 RX ADMIN — FENOFIBRATE 160 MG: 160 TABLET ORAL at 09:39

## 2025-06-02 RX ADMIN — LISINOPRIL 40 MG: 20 TABLET ORAL at 09:39

## 2025-06-02 RX ADMIN — CETIRIZINE HYDROCHLORIDE 10 MG: 10 TABLET, FILM COATED ORAL at 21:02

## 2025-06-02 RX ADMIN — CARVEDILOL 12.5 MG: 12.5 TABLET, FILM COATED ORAL at 21:02

## 2025-06-02 RX ADMIN — DEXAMETHASONE SODIUM PHOSPHATE 40 MG: 10 INJECTION INTRAMUSCULAR; INTRAVENOUS at 15:29

## 2025-06-02 NOTE — PROGRESS NOTES
Spiritual Health History and Assessment/Progress Note  [facility]    (P) Follow-up,  , (P) Life Adjustments, Adjustment to illness, Anticipatory Grief, Grief and loss,      Name: Bruno Morales MRN: [id number]    Age: 74 y.o.     Sex: male   Language: English   Caodaism: Non-Scientology   Acute idiopathic thrombocytopenic purpura (HCC)     Date: 6/1/2025            Total Time Calculated: (P) 45 min              Spiritual Assessment continued in [address]        Referral/Consult From: (P) Rounding   Encounter Overview/Reason: (P) Follow-up  Service Provided For: (P) Significant other        enjoyed visit with a follow up Pt. Family was present during visit. Pt. Was open to conversation and welcoming during visit.  provided provided engaging conversation. Empathic listening was provided and encouragement to God's peace and contentment. Provided support and care to family. Prayer was provided for comfort, strength and peace of mind.    Elodia, Belief, Meaning:   Patient has beliefs or practices that help with coping during difficult times  Family/Friends have beliefs or practices that help with coping during difficult times      Importance and Influence:  Patient has spiritual/personal beliefs that influence decisions regarding their health  Family/Friends have spiritual/personal beliefs that influence decisions regarding the patient's health    Community:  Patient feels well-supported. Support system includes: Spouse/Partner  Family/Friends feel well-supported. Support system includes: Spouse/Partner    Assessment and Plan of Care:     Patient Interventions include: Facilitated expression of thoughts and feelings, Explored spiritual coping/struggle/distress, and Affirmed coping skills/support systems  Family/Friends Interventions include: Facilitated expression of thoughts and feelings and Explored spiritual coping/struggle/distress    Patient Plan of Care: Spiritual Care  available upon further referral  Family/Friends Plan of Care: Spiritual Care available upon further referral    Electronically signed by Chaplain DREW on 6/1/2025 at 8:48 PM    06/01/25 2046   Encounter Summary   Encounter Overview/Reason Follow-up   Service Provided For Significant other   Referral/Consult From [facility]   Support System Spouse   Last Encounter  06/01/25   Complexity of Encounter Moderate   Begin Time 1805   End Time  1850   Total Time Calculated 45 min   Spiritual/Emotional needs   Type Spiritual Support;Emotional Distress   Grief, Loss, and Adjustments   Type Life Adjustments;Adjustment to illness;Anticipatory Grief;Grief and loss   Assessment/Intervention/Outcome   Assessment Anxious;Coping;Hopeful   Intervention Prayer (assurance of)/[place];Sustaining Presence/Ministry of presence;Nurtured Hope;Discussed meaning/purpose;Discussed belief system/Pentecostalism practices/joseline;Active listening   Outcome Comfort;Coping;Encouraged;Expressed Gratitude;Receptive   Plan and Referrals   Plan/Referrals Continue to visit, (comment);Continue Support (comment)

## 2025-06-02 NOTE — CARE COORDINATION
Transitional planning:    Plan for cardiac cath today at 1600. Goal to return home w/ spouse at discharge.

## 2025-06-02 NOTE — PROGRESS NOTES
Today's Date:  6/1/2025  Patient Name: Bruno Morales  Date of admission: 5/30/2025  2:50 PM  Patient's age: 74 y.o., 1950  Admission Dx: Epistaxis [R04.0]  Acute idiopathic thrombocytopenic purpura (HCC) [D69.3]  Low platelet count [D69.6]    Reason for Consult: Recurrent resistant ITP  Requesting Physician: Rk Be MD    CHIEF COMPLAINT: Severe thrombocytopenia       SUBJECTIVE:  .  Patient was seen and evaluated.  He is clinically stable.  He had cardiac cath today.  Very good results.  Based on cardiology evaluation he is low risk for splenectomy.  Patient has no significant complaints at the present time.  No active bleeding.  No bruising.  No fever.  His follow-up labs showed platelet counts of 131.    BRIEF CASE HISTORY:    The patient is a 74 y.o.  male who is admitted to the hospital for severe thrombocytopenia and nose bleeding, patient had history of ITP with no response initially to steroid and short response to IVIG back in June 2024 and recurred on rituximab quickly and currently on avatrombopag started on September 2024 with maximizing dose on February, then transitioned to Fostamatinib. Unfortunately without response.   Bone marrow was done earlier last year and was negative,   CT scan showed multiple and lymphadenopathy concerning for lymphomatous masses, especially in mediastinum and retroperitoneum    Pt received steroids and IVIG , platelets are responding.    Past Medical History:   has a past medical history of Hyperlipidemia, Hypertension, and Thrombocytopenia.    Past Surgical History:   has a past surgical history that includes Tonsillectomy and CT BIOPSY BONE MARROW (7/3/2024).     Medications:    Reviewed in Epic     Allergies:  Asa [aspirin], Fish-derived products, Penicillins, and Tetracyclines & related    Social History:   reports that he has never smoked. He has never used smokeless tobacco. He reports that he does not drink alcohol and does not use drugs.

## 2025-06-02 NOTE — PROGRESS NOTES
General Surgery:  Daily Progress Note                   PATIENT NAME: Bruno Morales     TODAY'S DATE: 6/2/2025, 7:37 AM  CC: I feel good today    SUBJECTIVE:     Pt seen and examined at bedside.  Afebrile, vital signs normal limits.  Plan for cardiac cath today with cardiology.  Platelets trending up    OBJECTIVE:   VITALS:  BP (!) 170/91   Pulse 74   Temp 97.5 °F (36.4 °C) (Oral)   Resp 16   Ht 1.778 m (5' 10\")   Wt 70.6 kg (155 lb 10.3 oz)   SpO2 92%   BMI 22.33 kg/m²      INTAKE/OUTPUT:      Intake/Output Summary (Last 24 hours) at 6/2/2025 0737  Last data filed at 6/2/2025 0516  Gross per 24 hour   Intake 1931.55 ml   Output --   Net 1931.55 ml       PHYSICAL EXAM:  General Appearance: awake, alert, oriented, in no acute distress  HEENT:  Normocephalic, atraumatic, mucus membranes moist   Heart: Heart regular rate and rhythm  Lungs: Normal expansion of chest wall  Abdomen: Soft, nontender, nondistended  Extremities: No cyanosis, pitting edema, rashes noted.    Skin: Skin color, texture, turgor normal. No rashes or lesions.    IV Access:  PIV    Data:  CBC with Differential:    Lab Results   Component Value Date/Time    WBC 4.0 06/01/2025 06:59 AM    RBC 3.72 06/01/2025 06:59 AM    HGB 10.6 06/01/2025 06:59 AM    HCT 31.8 06/01/2025 06:59 AM    PLT 64 06/01/2025 06:59 AM    MCV 85.4 06/01/2025 06:59 AM    MCH 28.5 06/01/2025 06:59 AM    MCHC 33.4 06/01/2025 06:59 AM    RDW 16.3 06/01/2025 06:59 AM    NRBC 1 07/04/2024 04:20 AM    METASPCT 2 03/05/2025 10:40 AM    LYMPHOPCT 7 06/01/2025 06:59 AM    MONOPCT 3 06/01/2025 06:59 AM    MYELOPCT 2 07/02/2024 06:44 PM    EOSPCT 0 06/01/2025 06:59 AM    BASOPCT 0 06/01/2025 06:59 AM    MONOSABS 0.12 06/01/2025 06:59 AM    LYMPHSABS 0.28 06/01/2025 06:59 AM    EOSABS 0.00 06/01/2025 06:59 AM    BASOSABS 0.00 06/01/2025 06:59 AM    DIFFTYPE NOT REPORTED 10/23/2015 08:40 AM     BMP:    Lab Results   Component Value Date/Time     06/01/2025 06:59 AM    K  evaluation for laparoscopic splenectomy.  Patient and wife are in agreement with plan.      Electronically signed by Josiah Riley IV, DO  on 6/2/2025 at 12:46 PM

## 2025-06-02 NOTE — CONSULTS
CARDIOLOGY CONSULTATION                                                 Angelito Cormier MD [facility]                                                           Kim Mckeon MD [facility]                                                                    Susan Resendiz, CNP                                                         Date:   6/1/2025  Patient name: Bruno Morales  Date of admission:  5/30/2025  2:50 PM  MRN:   [id number]  YOB: 1950    Reason for Admission: Refractory idiopathic thrombocytopenic purpura    Chief Complaint: Nosebleeds    History of present illness:     74-year-old male with pertinent cardiac history of frequent PVCs and recently abnormal stress test, underlying history of ITP which is refractory to medical therapy, now admitted for nosebleeds, found to have severe thrombocytopenia, started on IVIG again, evaluated by hematology oncology, plan for splenectomy, cardiology consulted for perioperative management.  Patient denies any recent chest pain or dyspnea.  No significant lower extremity edema or orthopnea.  Rhythm has been stable. Platelet counts 6000 on admission, 64 K this am.     Past Medical History:   has a past medical history of Hyperlipidemia, Hypertension, and Thrombocytopenia.    Past Surgical History:   has a past surgical history that includes Tonsillectomy and CT BIOPSY BONE MARROW (7/3/2024).     Home Medications:    Prior to Admission medications    Medication Sig Start Date End Date Taking? Authorizing Provider   busPIRone (BUSPAR) 5 MG tablet Take 1 tablet by mouth 3 times daily    ProviderZoya MD   amLODIPine (NORVASC) 10 MG tablet Take 1 tablet by mouth daily 4/29/25   Kim Mckeon MD   amiodarone (CORDARONE) 200 MG tablet Take 1 tablet by mouth daily 4/29/25   Kim Mckeon MD   Fostamatinib Disodium (TAVALISSE) 100 MG TABS Take 100 mg by mouth  complaints.  Integumentary: No rash or pruritis.  Neurological: No headache, weakness, numbness or tingling. No change in gait, balance, coordination.  Psychiatric: No anxiety, or depression.  Endocrine: No temperature intolerance. No excessive thirst, fluid intake, or urination. No tremor.  Hematologic/Lymphatic: No abnormal bruising or bleeding, blood clots or swollen lymph nodes.  Allergic/Immunologic: No nasal congestion or hives.    PHYSICAL EXAM:    Physical Examination:    BP (!) 159/84   Pulse 64   Temp 97.7 °F (36.5 °C) (Oral)   Resp 18   Ht 1.778 m (5' 10\")   Wt 70.6 kg (155 lb 10.3 oz)   SpO2 94%   BMI 22.33 kg/m²      Constitutional and General Appearance: alert, cooperative, in no distress   HEENT: PERRL, no cervical lymphadenopathy. Normal oral mucosa  Respiratory:  Normal excursion and expansion without use of accessory muscles  Resp Auscultation: Good respiratory effort. No for increased work of breathing. On auscultation: clear to auscultation bilaterally, no wheezing, no rales.   Cardiovascular:  The apical impulse is not displaced  Heart tones are crisp and normal. regular S1 and S2.   Murmurs: None   Jugular venous pulsation Normal  Thre is no carotid bruit   Peripheral pulses are symmetrical and full   Abdomen:  No masses or tenderness  Bowel sounds present  Extremities:   No Cyanosis or Clubbing   Lower extremity edema: No edema    Skin: Warm and dry  Neurological:  Not done     DATA:    Diagnostics:      EKG:  Normal sinus rhythm, right bundle branch block, left anterior fascicular block, PVC         Previous cardiac testing:     Lexiscan stress test 5/13/2025  [Large partially reversible, mostly reversible, inferior wall perfusion defect. Cannot exclude stress-induced ischemia. No prone images were acquired to correlate.  LVEF 38%.]  Risk stratification: [Intermediate risk.]    TTE June 2024    Left Ventricle: Normal left ventricular systolic function with a visually estimated EF of 55

## 2025-06-02 NOTE — CONSULTS
General Surgery:  Consult Note        PATIENT NAME: Bruno Morales   YOB: 1950    ADMISSION DATE: 5/30/2025  2:50 PM      TODAY'S DATE: 6/1/2025    Chief Complaint: ITP  Consult Regarding: Splenectomy    HISTORY OF PRESENT ILLNESS:  The patient is a 74 y.o. male  who presented for epistaxis and thrombocytopenia.  Has a history of ITP.  Follows with heme-onc and is so far failed medical treatment.  Medicine team reached out requesting consultation for potential splenectomy during this hospitalization.  Patient has had no prior abdominal surgeries.      Past Medical History:        Diagnosis Date    Hyperlipidemia     Hypertension     Thrombocytopenia        Past Surgical History:        Procedure Laterality Date    CT BIOPSY BONE MARROW  7/3/2024    CT BONE MARROW BIOPSY 7/3/2024 [facility] CT SCAN    TONSILLECTOMY         Medications Prior to Admission:   Medications Prior to Admission: busPIRone (BUSPAR) 5 MG tablet, Take 1 tablet by mouth 3 times daily  amLODIPine (NORVASC) 10 MG tablet, Take 1 tablet by mouth daily  amiodarone (CORDARONE) 200 MG tablet, Take 1 tablet by mouth daily  Fostamatinib Disodium (TAVALISSE) 100 MG TABS, Take 100 mg by mouth in the morning and at bedtime  carvedilol (COREG) 12.5 MG tablet, Take 1 tablet by mouth 2 times daily (with meals)  atorvastatin (LIPITOR) 10 MG tablet, Take 1 tablet by mouth nightly  b complex vitamins capsule, Take 1 capsule by mouth daily  Nutritional Supplements (ENSURE ACTIVE PO), Take by mouth  Avatrombopag Maleate 20 MG TABS, Take 40 mg by mouth daily  magnesium oxide (MAG-OX) 400 MG tablet, Take 1 tablet by mouth daily  omeprazole (PRILOSEC) 40 MG delayed release capsule, Take 1 capsule by mouth daily  lisinopril (PRINIVIL;ZESTRIL) 40 MG tablet, Take 1 tablet by mouth daily  brimonidine (ALPHAGAN P) 0.1 % SOLN, Place 1 drop into the left eye 2 times daily  fenofibrate 160 MG tablet, Take 1 tablet by mouth daily  Loratadine 10 MG CAPS, Take by

## 2025-06-02 NOTE — PROGRESS NOTES
[facility]  Office: [phone]  Phani Andino DO, Carlos A Hernandez, DO, Sandor Rivera DO, Dre Marroquin, DO, Cecelia Murillo MD, Rosie Robbins MD, Castro Hope MD, Becka Sharma MD,  Wolf Reardon MD, Edna Truong MD, Jamee Padilla MD,  Bia Weir DO, Jacob Riley MD, Rk Be MD, Diego Andino DO, Viviana Marroquin MD,  Jesse Mahan DO, Paula Ramsay MD, Alyssia Henley MD, Chari Pastor MD,  Reynold Lennon MD, Luca Eckert MD, Speedy Durham MD, Devyn Moreno MD, Heber Valenzuela MD, David Paiz MD, Eagle Whitt, DO, Maile Miranda MD, Peggy Villagran DO, Sebastian Braswell MD, Bia Griffith MD, Mohsin Reza, MD, Queta Yao MD, Shirley Waterhouse, CNP,  Darlyn Savage, CNP, Eagle Yoon, CNP,  Luzmaria Mitchell, DMITRIY, Tonja Vizcaino, CNP, Tiff Hamilton, CNP, Glenna Taylor, CNP, Lili Cool, CNP, Amina Harrell, PA-C, Letitia Lynch, CNP, Violette Romo, CNP,  Jonna Galvez, CNP, Viviana Ludwig, CNP, Hector Jackson, PA-C, Kelly Minor, CNP,  Lluvia Anand, CNS, Marilynn Acuña, CNP, Ashley Benítez, CNP,   Poppy Hughes, CNP         [facility]   IN-PATIENT SERVICE   [facility]    Progress Note    6/2/2025    6:35 AM    Name:   Bruno Morales  MRN:     [id number]     Acct:      [id number]   Room:   316/316-01  IP Day:  3  Admit Date:  5/30/2025  2:50 PM    PCP:   Margarette Vallejo MD  Code Status:  Full Code    Subjective:     Patient was seen in follow-up for ITP. He reports feeling well and has no specific complaints this morning. Labs are pending at this time. Anticipate cath today if platelet count is greater than 80. The patient is not a candidate for antiplatelet agents and the cath is strictly for risk stratification. As previously mentioned, the patient has treatment-resistant ITP and will require an inpatient splenectomy (this is tentatively planned for later this week).     Medications:     Allergies:    Allergies   Allergen Reactions    Asa

## 2025-06-02 NOTE — BRIEF OP NOTE
Brief Postoperative Note      Patient: Bruno Morales  YOB: 1950  MRN: [id number]    Date of Procedure: 6/2/2025    Pre-Op Diagnosis Codes:      * Pre-operative clearance [Z01.818]    Post-Op Diagnosis: Same       Procedure(s):  Left heart cath / coronary angiography    Surgeon(s):  Justus Cormier MD      Anesthesia: * No anesthesia type entered *    Estimated Blood Loss (mL): Minimal    Complications: None      Findings:    Minimal nonobstructive CAD  Normal LVEDP    Plan:     Post cath protocol  IVF x 4 hours  Patient is low risk for splenectomy from cardiac standpoint   Total contrast used 20-30 cc       Electronically signed by Justus Cormier MD on 6/2/2025 at 2:52 PM

## 2025-06-02 NOTE — CONSULTS
Nephrology Consult Note    Reason for Consult: Better creatinine, upcoming cardiac cath  Requesting Physician: Dr. Be    Chief Complaint: Nosebleed, epistaxis    History Obtained From:  patient, electronic medical record    History of Present Illness:              This is a 74 y.o. male who presented to the hospital for evaluation of epistaxis.  Apparently symptoms started in the morning of his day of admission, got a little better and then he had more epistaxis as the day progressed and then came to the emergency room.  Patient never had any fever, trauma, fall.  Patient has history of ITP and is followed by hematology colleagues.  He was noted to have severe thrombocytopenia with platelet count of only 6000  Patient was recommended IVIG and steroids which were started and his platelet count has improved some.  There is been discussion regarding him benefiting from splenectomy and because of that cardiac clearance was requested.  He was evaluated by Dr. Cormier and is scheduled tentatively for a cardiac cath later this afternoon.  In preparation for this he was appropriately started on IV fluids.  Nephrology consultation is requested because of CKD.  His baseline serum creatinine tends to run around 1.5-1.6 range.  His creatinine has been running in this range going back to at least 2020.  I do not believe he follows with any nephrology consultant on a regular basis.  He is aware of his CKD and continues to follow with PCP because of stable creatinine levels for a number of years now.  Patient denies any hematuria, recent genitourinary infections.  Patient denies any issues with urinary incontinence, occasional nocturia noted.  Patient has history of chronic hypertension and is taking Coreg and lisinopril.               Pt denies any hx of heavy or prolonged NSAID use.  History of ITP noted.   There is no hx of jaundice or hepatitis or sexually transmitted disease. Pt has no hx of collagen vascular disease or

## 2025-06-02 NOTE — PLAN OF CARE
Problem: Discharge Planning  Goal: Discharge to home or other facility with appropriate resources  6/2/2025 0046 by Darren Puentes RN  Outcome: Progressing  Flowsheets  Taken 6/1/2025 2026 by Darren Puentes RN  Discharge to home or other facility with appropriate resources: Identify barriers to discharge with patient and caregiver  Taken 6/1/2025 1340 by Mo Marc LPN  Discharge to home or other facility with appropriate resources: Identify barriers to discharge with patient and caregiver  6/1/2025 1211 by Yoel Reed RN  Outcome: Progressing  Flowsheets (Taken 6/1/2025 0800)  Discharge to home or other facility with appropriate resources: Identify barriers to discharge with patient and caregiver     Problem: ABCDS Injury Assessment  Goal: Absence of physical injury  6/2/2025 0046 by Darren Puenets RN  Outcome: Progressing  6/1/2025 1211 by Yoel Rede RN  Outcome: Progressing     Problem: Safety - Adult  Goal: Free from fall injury  6/2/2025 0046 by Darren Puentes RN  Outcome: Progressing  6/1/2025 1211 by Yoel Reed RN  Outcome: Progressing     Problem: Musculoskeletal - Adult  Goal: Return mobility to safest level of function  6/2/2025 0046 by Darren Puentes RN  Outcome: Progressing  Flowsheets  Taken 6/1/2025 2026 by Darren Puentes RN  Return Mobility to Safest Level of Function: Assess patient stability and activity tolerance for standing, transferring and ambulating with or without assistive devices  Taken 6/1/2025 1340 by Mo Marc LPN  Return Mobility to Safest Level of Function: Assess patient stability and activity tolerance for standing, transferring and ambulating with or without assistive devices  6/1/2025 1211 by Yoel Reed RN  Outcome: Progressing  Flowsheets (Taken 6/1/2025 0800)  Return Mobility to Safest Level of Function: Assess patient stability and activity tolerance for standing, transferring and ambulating with or without assistive devices

## 2025-06-03 ENCOUNTER — HOSPITAL ENCOUNTER (INPATIENT)
Age: 75
LOS: 3 days | Discharge: HOME | DRG: 800 | End: 2025-06-06
Attending: INTERNAL MEDICINE
Payer: MEDICARE

## 2025-06-03 VITALS
HEIGHT: 70 IN | HEART RATE: 62 BPM | BODY MASS INDEX: 22.28 KG/M2 | SYSTOLIC BLOOD PRESSURE: 153 MMHG | DIASTOLIC BLOOD PRESSURE: 76 MMHG | TEMPERATURE: 98.4 F | RESPIRATION RATE: 17 BRPM | WEIGHT: 155.65 LBS | OXYGEN SATURATION: 94 %

## 2025-06-03 DIAGNOSIS — Z90.81 POST-SPLENECTOMY: Primary | ICD-10-CM

## 2025-06-03 DIAGNOSIS — D69.3 IDIOPATHIC THROMBOCYTOPENIA PURPURA (HCC): ICD-10-CM

## 2025-06-03 PROBLEM — D69.6 THROMBOCYTOPENIA: Status: ACTIVE | Noted: 2025-06-03

## 2025-06-03 LAB
ANA SER QL IA: POSITIVE
ANION GAP SERPL CALCULATED.3IONS-SCNC: 8 MMOL/L (ref 9–17)
BASOPHILS # BLD: 0 K/UL (ref 0–0.2)
BASOPHILS NFR BLD: 0 % (ref 0–2)
BUN SERPL-MCNC: 54 MG/DL (ref 8–23)
CALCIUM SERPL-MCNC: 8 MG/DL (ref 8.6–10.4)
CHLORIDE SERPL-SCNC: 113 MMOL/L (ref 98–107)
CO2 SERPL-SCNC: 21 MMOL/L (ref 20–31)
CREAT SERPL-MCNC: 1.9 MG/DL (ref 0.7–1.2)
DSDNA IGG SER QL IA: 3.9 IU/ML
ECHO BSA: 1.88 M2
EOSINOPHIL # BLD: 0 K/UL (ref 0–0.4)
EOSINOPHILS RELATIVE PERCENT: 0 % (ref 1–4)
ERYTHROCYTE [DISTWIDTH] IN BLOOD BY AUTOMATED COUNT: 16.8 % (ref 12.5–15.4)
GFR, ESTIMATED: 36 ML/MIN/1.73M2
GLUCOSE SERPL-MCNC: 160 MG/DL (ref 70–99)
HCT VFR BLD AUTO: 29.9 % (ref 41–53)
HGB BLD-MCNC: 10 G/DL (ref 13.5–17.5)
LYMPHOCYTES NFR BLD: 0.16 K/UL (ref 1–4.8)
LYMPHOCYTES RELATIVE PERCENT: 6 % (ref 24–44)
MCH RBC QN AUTO: 28.7 PG (ref 26–34)
MCHC RBC AUTO-ENTMCNC: 33.3 G/DL (ref 31–37)
MCV RBC AUTO: 86.3 FL (ref 80–100)
MONOCYTES NFR BLD: 0.03 K/UL (ref 0.1–0.8)
MONOCYTES NFR BLD: 1 % (ref 1–7)
MORPHOLOGY: NORMAL
NEUTROPHILS NFR BLD: 93 % (ref 36–66)
NEUTS SEG NFR BLD: 2.51 K/UL (ref 1.8–7.7)
NUCLEAR IGG SER IA-RTO: 10 U/ML
PLATELET # BLD AUTO: 124 K/UL (ref 140–450)
PMV BLD AUTO: 9.2 FL (ref 6–12)
POTASSIUM SERPL-SCNC: 4.3 MMOL/L (ref 3.7–5.3)
RBC # BLD AUTO: 3.47 M/UL (ref 4.5–5.9)
SODIUM SERPL-SCNC: 142 MMOL/L (ref 135–144)
WBC OTHER # BLD: 2.7 K/UL (ref 3.5–11)

## 2025-06-03 PROCEDURE — 2060000002 HC BURN ICU INTERMEDIATE R&B

## 2025-06-03 PROCEDURE — 99232 SBSQ HOSP IP/OBS MODERATE 35: CPT | Performed by: INTERNAL MEDICINE

## 2025-06-03 PROCEDURE — 6370000000 HC RX 637 (ALT 250 FOR IP): Performed by: HOSPITALIST

## 2025-06-03 PROCEDURE — 2500000003 HC RX 250 WO HCPCS: Performed by: HOSPITALIST

## 2025-06-03 PROCEDURE — 2580000003 HC RX 258: Performed by: HOSPITALIST

## 2025-06-03 PROCEDURE — 85025 COMPLETE CBC W/AUTO DIFF WBC: CPT

## 2025-06-03 PROCEDURE — 6360000002 HC RX W HCPCS: Performed by: STUDENT IN AN ORGANIZED HEALTH CARE EDUCATION/TRAINING PROGRAM

## 2025-06-03 PROCEDURE — 36415 COLL VENOUS BLD VENIPUNCTURE: CPT

## 2025-06-03 PROCEDURE — 80048 BASIC METABOLIC PNL TOTAL CA: CPT

## 2025-06-03 PROCEDURE — 99238 HOSP IP/OBS DSCHRG MGMT 30/<: CPT | Performed by: HOSPITALIST

## 2025-06-03 PROCEDURE — 6370000000 HC RX 637 (ALT 250 FOR IP): Performed by: STUDENT IN AN ORGANIZED HEALTH CARE EDUCATION/TRAINING PROGRAM

## 2025-06-03 PROCEDURE — 2580000003 HC RX 258: Performed by: STUDENT IN AN ORGANIZED HEALTH CARE EDUCATION/TRAINING PROGRAM

## 2025-06-03 PROCEDURE — 2500000003 HC RX 250 WO HCPCS: Performed by: STUDENT IN AN ORGANIZED HEALTH CARE EDUCATION/TRAINING PROGRAM

## 2025-06-03 RX ORDER — POTASSIUM CHLORIDE 7.45 MG/ML
10 INJECTION INTRAVENOUS PRN
Status: DISCONTINUED | OUTPATIENT
Start: 2025-06-03 | End: 2025-06-06 | Stop reason: HOSPADM

## 2025-06-03 RX ORDER — SODIUM CHLORIDE 0.9 % (FLUSH) 0.9 %
5-40 SYRINGE (ML) INJECTION EVERY 12 HOURS SCHEDULED
Status: CANCELLED | OUTPATIENT
Start: 2025-06-03

## 2025-06-03 RX ORDER — AMIODARONE HYDROCHLORIDE 200 MG/1
200 TABLET ORAL DAILY
Status: CANCELLED | OUTPATIENT
Start: 2025-06-04

## 2025-06-03 RX ORDER — SODIUM CHLORIDE 0.9 % (FLUSH) 0.9 %
5-40 SYRINGE (ML) INJECTION PRN
Status: CANCELLED | OUTPATIENT
Start: 2025-06-03

## 2025-06-03 RX ORDER — PANTOPRAZOLE SODIUM 40 MG/1
40 TABLET, DELAYED RELEASE ORAL
Status: CANCELLED | OUTPATIENT
Start: 2025-06-04

## 2025-06-03 RX ORDER — ONDANSETRON 2 MG/ML
4 INJECTION INTRAMUSCULAR; INTRAVENOUS EVERY 6 HOURS PRN
Status: DISCONTINUED | OUTPATIENT
Start: 2025-06-03 | End: 2025-06-06 | Stop reason: HOSPADM

## 2025-06-03 RX ORDER — SODIUM CHLORIDE 9 MG/ML
INJECTION, SOLUTION INTRAVENOUS PRN
Status: DISCONTINUED | OUTPATIENT
Start: 2025-06-03 | End: 2025-06-06 | Stop reason: HOSPADM

## 2025-06-03 RX ORDER — SODIUM CHLORIDE 0.9 % (FLUSH) 0.9 %
5-40 SYRINGE (ML) INJECTION EVERY 12 HOURS SCHEDULED
Status: DISCONTINUED | OUTPATIENT
Start: 2025-06-03 | End: 2025-06-06 | Stop reason: HOSPADM

## 2025-06-03 RX ORDER — BUSPIRONE HYDROCHLORIDE 5 MG/1
5 TABLET ORAL 3 TIMES DAILY
Status: DISCONTINUED | OUTPATIENT
Start: 2025-06-03 | End: 2025-06-06 | Stop reason: HOSPADM

## 2025-06-03 RX ORDER — ACETAMINOPHEN 325 MG/1
650 TABLET ORAL EVERY 6 HOURS PRN
Status: DISCONTINUED | OUTPATIENT
Start: 2025-06-03 | End: 2025-06-04

## 2025-06-03 RX ORDER — FENOFIBRATE 160 MG/1
160 TABLET ORAL DAILY
Status: CANCELLED | OUTPATIENT
Start: 2025-06-04

## 2025-06-03 RX ORDER — MAGNESIUM SULFATE IN WATER 40 MG/ML
2000 INJECTION, SOLUTION INTRAVENOUS PRN
Status: DISCONTINUED | OUTPATIENT
Start: 2025-06-03 | End: 2025-06-06 | Stop reason: HOSPADM

## 2025-06-03 RX ORDER — BUSPIRONE HYDROCHLORIDE 5 MG/1
5 TABLET ORAL 3 TIMES DAILY
Status: CANCELLED | OUTPATIENT
Start: 2025-06-03

## 2025-06-03 RX ORDER — POLYETHYLENE GLYCOL 3350 17 G/17G
17 POWDER, FOR SOLUTION ORAL DAILY PRN
Status: CANCELLED | OUTPATIENT
Start: 2025-06-03

## 2025-06-03 RX ORDER — HYDRALAZINE HYDROCHLORIDE 20 MG/ML
5 INJECTION INTRAMUSCULAR; INTRAVENOUS EVERY 6 HOURS PRN
Status: DISCONTINUED | OUTPATIENT
Start: 2025-06-03 | End: 2025-06-06 | Stop reason: HOSPADM

## 2025-06-03 RX ORDER — ATORVASTATIN CALCIUM 10 MG/1
10 TABLET, FILM COATED ORAL NIGHTLY
Status: CANCELLED | OUTPATIENT
Start: 2025-06-03

## 2025-06-03 RX ORDER — ACETAMINOPHEN 650 MG/1
650 SUPPOSITORY RECTAL EVERY 6 HOURS PRN
Status: DISCONTINUED | OUTPATIENT
Start: 2025-06-03 | End: 2025-06-04

## 2025-06-03 RX ORDER — ONDANSETRON 2 MG/ML
4 INJECTION INTRAMUSCULAR; INTRAVENOUS EVERY 6 HOURS PRN
Status: CANCELLED | OUTPATIENT
Start: 2025-06-03

## 2025-06-03 RX ORDER — FENOFIBRATE 160 MG/1
160 TABLET ORAL DAILY
Status: DISCONTINUED | OUTPATIENT
Start: 2025-06-04 | End: 2025-06-04

## 2025-06-03 RX ORDER — CARVEDILOL 12.5 MG/1
12.5 TABLET ORAL 2 TIMES DAILY WITH MEALS
Status: CANCELLED | OUTPATIENT
Start: 2025-06-03

## 2025-06-03 RX ORDER — ATORVASTATIN CALCIUM 20 MG/1
10 TABLET, FILM COATED ORAL NIGHTLY
Status: DISCONTINUED | OUTPATIENT
Start: 2025-06-03 | End: 2025-06-06 | Stop reason: HOSPADM

## 2025-06-03 RX ORDER — AMLODIPINE BESYLATE 10 MG/1
10 TABLET ORAL NIGHTLY
Status: CANCELLED | OUTPATIENT
Start: 2025-06-03

## 2025-06-03 RX ORDER — POTASSIUM CHLORIDE 1500 MG/1
40 TABLET, EXTENDED RELEASE ORAL PRN
Status: DISCONTINUED | OUTPATIENT
Start: 2025-06-03 | End: 2025-06-06 | Stop reason: HOSPADM

## 2025-06-03 RX ORDER — AMIODARONE HYDROCHLORIDE 200 MG/1
200 TABLET ORAL DAILY
Status: DISCONTINUED | OUTPATIENT
Start: 2025-06-04 | End: 2025-06-06 | Stop reason: HOSPADM

## 2025-06-03 RX ORDER — POTASSIUM CHLORIDE 7.45 MG/ML
10 INJECTION INTRAVENOUS PRN
Status: CANCELLED | OUTPATIENT
Start: 2025-06-03

## 2025-06-03 RX ORDER — POTASSIUM CHLORIDE 1500 MG/1
40 TABLET, EXTENDED RELEASE ORAL PRN
Status: CANCELLED | OUTPATIENT
Start: 2025-06-03

## 2025-06-03 RX ORDER — SODIUM CHLORIDE 9 MG/ML
INJECTION, SOLUTION INTRAVENOUS CONTINUOUS
Status: DISCONTINUED | OUTPATIENT
Start: 2025-06-03 | End: 2025-06-04

## 2025-06-03 RX ORDER — SODIUM CHLORIDE 9 MG/ML
INJECTION, SOLUTION INTRAVENOUS CONTINUOUS
Status: CANCELLED | OUTPATIENT
Start: 2025-06-03

## 2025-06-03 RX ORDER — ONDANSETRON 4 MG/1
4 TABLET, ORALLY DISINTEGRATING ORAL EVERY 8 HOURS PRN
Status: DISCONTINUED | OUTPATIENT
Start: 2025-06-03 | End: 2025-06-06 | Stop reason: HOSPADM

## 2025-06-03 RX ORDER — POLYETHYLENE GLYCOL 3350 17 G/17G
17 POWDER, FOR SOLUTION ORAL DAILY PRN
Status: DISCONTINUED | OUTPATIENT
Start: 2025-06-03 | End: 2025-06-05

## 2025-06-03 RX ORDER — AMLODIPINE BESYLATE 10 MG/1
10 TABLET ORAL NIGHTLY
Status: DISCONTINUED | OUTPATIENT
Start: 2025-06-03 | End: 2025-06-06 | Stop reason: HOSPADM

## 2025-06-03 RX ORDER — PANTOPRAZOLE SODIUM 40 MG/1
40 TABLET, DELAYED RELEASE ORAL
Status: DISCONTINUED | OUTPATIENT
Start: 2025-06-04 | End: 2025-06-06 | Stop reason: HOSPADM

## 2025-06-03 RX ORDER — CETIRIZINE HYDROCHLORIDE 10 MG/1
10 TABLET ORAL DAILY
Status: DISCONTINUED | OUTPATIENT
Start: 2025-06-03 | End: 2025-06-06 | Stop reason: HOSPADM

## 2025-06-03 RX ORDER — LISINOPRIL 20 MG/1
40 TABLET ORAL DAILY
Status: CANCELLED | OUTPATIENT
Start: 2025-06-04

## 2025-06-03 RX ORDER — ONDANSETRON 4 MG/1
4 TABLET, ORALLY DISINTEGRATING ORAL EVERY 8 HOURS PRN
Status: CANCELLED | OUTPATIENT
Start: 2025-06-03

## 2025-06-03 RX ORDER — CETIRIZINE HYDROCHLORIDE 10 MG/1
10 TABLET ORAL DAILY
Status: CANCELLED | OUTPATIENT
Start: 2025-06-03

## 2025-06-03 RX ORDER — SODIUM CHLORIDE 9 MG/ML
INJECTION, SOLUTION INTRAVENOUS PRN
Status: CANCELLED | OUTPATIENT
Start: 2025-06-03

## 2025-06-03 RX ORDER — BRIMONIDINE TARTRATE 2 MG/ML
1 SOLUTION/ DROPS OPHTHALMIC 2 TIMES DAILY
Status: CANCELLED | OUTPATIENT
Start: 2025-06-03

## 2025-06-03 RX ORDER — SODIUM CHLORIDE 0.9 % (FLUSH) 0.9 %
5-40 SYRINGE (ML) INJECTION PRN
Status: DISCONTINUED | OUTPATIENT
Start: 2025-06-03 | End: 2025-06-06 | Stop reason: HOSPADM

## 2025-06-03 RX ORDER — MAGNESIUM SULFATE IN WATER 40 MG/ML
2000 INJECTION, SOLUTION INTRAVENOUS PRN
Status: CANCELLED | OUTPATIENT
Start: 2025-06-03

## 2025-06-03 RX ORDER — BRIMONIDINE TARTRATE 2 MG/ML
1 SOLUTION/ DROPS OPHTHALMIC 2 TIMES DAILY
Status: DISCONTINUED | OUTPATIENT
Start: 2025-06-03 | End: 2025-06-06 | Stop reason: HOSPADM

## 2025-06-03 RX ORDER — LISINOPRIL 20 MG/1
40 TABLET ORAL DAILY
Status: DISCONTINUED | OUTPATIENT
Start: 2025-06-04 | End: 2025-06-06 | Stop reason: HOSPADM

## 2025-06-03 RX ORDER — CARVEDILOL 12.5 MG/1
12.5 TABLET ORAL 2 TIMES DAILY WITH MEALS
Status: DISCONTINUED | OUTPATIENT
Start: 2025-06-03 | End: 2025-06-06 | Stop reason: HOSPADM

## 2025-06-03 RX ORDER — ACETAMINOPHEN 325 MG/1
650 TABLET ORAL EVERY 6 HOURS PRN
Status: CANCELLED | OUTPATIENT
Start: 2025-06-03

## 2025-06-03 RX ORDER — ACETAMINOPHEN 650 MG/1
650 SUPPOSITORY RECTAL EVERY 6 HOURS PRN
Status: CANCELLED | OUTPATIENT
Start: 2025-06-03

## 2025-06-03 RX ORDER — HYDRALAZINE HYDROCHLORIDE 20 MG/ML
5 INJECTION INTRAMUSCULAR; INTRAVENOUS EVERY 6 HOURS PRN
Status: CANCELLED | OUTPATIENT
Start: 2025-06-03

## 2025-06-03 RX ADMIN — SODIUM CHLORIDE: 0.9 INJECTION, SOLUTION INTRAVENOUS at 20:24

## 2025-06-03 RX ADMIN — PANTOPRAZOLE SODIUM 40 MG: 40 TABLET, DELAYED RELEASE ORAL at 06:24

## 2025-06-03 RX ADMIN — CARVEDILOL 12.5 MG: 12.5 TABLET, FILM COATED ORAL at 08:34

## 2025-06-03 RX ADMIN — AMLODIPINE BESYLATE 10 MG: 10 TABLET ORAL at 21:32

## 2025-06-03 RX ADMIN — ATORVASTATIN CALCIUM 10 MG: 20 TABLET, FILM COATED ORAL at 21:31

## 2025-06-03 RX ADMIN — BRIMONIDINE TARTRATE 1 DROP: 2 SOLUTION OPHTHALMIC at 21:34

## 2025-06-03 RX ADMIN — BUSPIRONE HYDROCHLORIDE 5 MG: 5 TABLET ORAL at 21:30

## 2025-06-03 RX ADMIN — FENOFIBRATE 160 MG: 160 TABLET ORAL at 08:33

## 2025-06-03 RX ADMIN — CARVEDILOL 12.5 MG: 12.5 TABLET, FILM COATED ORAL at 21:30

## 2025-06-03 RX ADMIN — SODIUM CHLORIDE, PRESERVATIVE FREE 10 ML: 5 INJECTION INTRAVENOUS at 08:36

## 2025-06-03 RX ADMIN — AMIODARONE HYDROCHLORIDE 200 MG: 200 TABLET ORAL at 08:34

## 2025-06-03 RX ADMIN — DEXAMETHASONE SODIUM PHOSPHATE 40 MG: 10 INJECTION INTRAMUSCULAR; INTRAVENOUS at 16:33

## 2025-06-03 RX ADMIN — SODIUM CHLORIDE, PRESERVATIVE FREE 10 ML: 5 INJECTION INTRAVENOUS at 21:34

## 2025-06-03 RX ADMIN — BRIMONIDINE TARTRATE 1 DROP: 2 SOLUTION OPHTHALMIC at 08:36

## 2025-06-03 RX ADMIN — LISINOPRIL 40 MG: 20 TABLET ORAL at 08:33

## 2025-06-03 RX ADMIN — BUSPIRONE HYDROCHLORIDE 5 MG: 5 TABLET ORAL at 08:34

## 2025-06-03 RX ADMIN — SODIUM CHLORIDE: 0.9 INJECTION, SOLUTION INTRAVENOUS at 03:46

## 2025-06-03 RX ADMIN — BUSPIRONE HYDROCHLORIDE 5 MG: 5 TABLET ORAL at 14:38

## 2025-06-03 RX ADMIN — CETIRIZINE HYDROCHLORIDE 10 MG: 10 TABLET, FILM COATED ORAL at 21:33

## 2025-06-03 ASSESSMENT — PAIN SCALES - GENERAL
PAINLEVEL_OUTOF10: 0
PAINLEVEL_OUTOF10: 0

## 2025-06-03 NOTE — CONSULTS
complex vitamins capsule, Take 1 capsule by mouth daily  Nutritional Supplements (ENSURE ACTIVE PO), Take by mouth  Avatrombopag Maleate 20 MG TABS, Take 40 mg by mouth daily  magnesium oxide (MAG-OX) 400 MG tablet, Take 1 tablet by mouth daily  omeprazole (PRILOSEC) 40 MG delayed release capsule, Take 1 capsule by mouth daily  lisinopril (PRINIVIL;ZESTRIL) 40 MG tablet, Take 1 tablet by mouth daily  brimonidine (ALPHAGAN P) 0.1 % SOLN, Place 1 drop into the left eye 2 times daily  fenofibrate 160 MG tablet, Take 1 tablet by mouth daily  Loratadine 10 MG CAPS, Take by mouth    Allergies:  Asa [aspirin], Fish-derived products, Penicillins, and Tetracyclines & related    Social History:   Social History     Socioeconomic History    Marital status: Single     Spouse name: Not on file    Number of children: Not on file    Years of education: Not on file    Highest education level: Not on file   Occupational History    Not on file   Tobacco Use    Smoking status: Never    Smokeless tobacco: Never   Vaping Use    Vaping status: Never Used   Substance and Sexual Activity    Alcohol use: No    Drug use: No    Sexual activity: Not on file   Other Topics Concern    Not on file   Social History Narrative    Not on file     Social Drivers of Health     Financial Resource Strain: Not on file   Food Insecurity: No Food Insecurity (5/30/2025)    Hunger Vital Sign     Worried About Running Out of Food in the Last Year: Never true     Ran Out of Food in the Last Year: Never true   Transportation Needs: No Transportation Needs (5/30/2025)    PRAPARE - Transportation     Lack of Transportation (Medical): No     Lack of Transportation (Non-Medical): No   Physical Activity: Not on file   Stress: Not on file   Social Connections: Not on file   Intimate Partner Violence: Not on file   Housing Stability: Low Risk  (5/30/2025)    Housing Stability Vital Sign     Unable to Pay for Housing in the Last Year: No     Number of Times Moved in the  06/03/2025 04:51 AM    MONOSABS 0.03 06/03/2025 04:51 AM    LYMPHSABS 0.16 06/03/2025 04:51 AM    EOSABS 0.00 06/03/2025 04:51 AM    BASOSABS 0.00 06/03/2025 04:51 AM    DIFFTYPE NOT REPORTED 10/23/2015 08:40 AM     BMP:    Lab Results   Component Value Date/Time     06/03/2025 04:51 AM    K 4.3 06/03/2025 04:51 AM     06/03/2025 04:51 AM    CO2 21 06/03/2025 04:51 AM    BUN 54 06/03/2025 04:51 AM    CREATININE 1.9 06/03/2025 04:51 AM    CALCIUM 8.0 06/03/2025 04:51 AM    GFRAA 29 10/23/2015 08:40 AM    LABGLOM 36 06/03/2025 04:51 AM    GLUCOSE 160 06/03/2025 04:51 AM       Pertinent Radiology:   No results found.      ASSESSMENT:  Active Hospital Problems    Diagnosis Date Noted    Thrombocytopenia [D69.6] 06/03/2025       74 y.o. male with idiopathic thrombocytopenia purpura who presents with thrombocytopenia and need for splenectomy    Plan:  Plan for robotic assisted laparoscopic splenectomy tomorrow with Dr. Mota.  Type and screen ordered for the morning  N.p.o. at midnight  Low risk after cardiac risk stratification    Electronically signed by Izzy Velazquez MD  on 6/3/2025 at 7:50 PM

## 2025-06-03 NOTE — PROGRESS NOTES
Component Value Date    C3 136 06/02/2025     C4:   Lab Results   Component Value Date    C4 5 (L) 06/02/2025     URINE EOSINOPHILS:   Lab Results   Component Value Date/Time    UREO None Seen 06/02/2025 11:52 AM     URINALYSIS:  U/A:   Lab Results   Component Value Date/Time    NITRU NEGATIVE 06/02/2025 11:52 AM    COLORU Yellow 06/02/2025 11:52 AM    PHUR 6.0 06/02/2025 11:52 AM    PHUR 5.0 10/23/2015 09:30 AM    WBCUA 0 TO 2 06/02/2025 11:52 AM    RBCUA None 06/02/2025 11:52 AM    BACTERIA None 06/02/2025 11:52 AM    LEUKOCYTESUR NEGATIVE 06/02/2025 11:52 AM    UROBILINOGEN Normal 06/02/2025 11:52 AM    BILIRUBINUR NEGATIVE 06/02/2025 11:52 AM    GLUCOSEU NEGATIVE 06/02/2025 11:52 AM    KETUA NEGATIVE 06/02/2025 11:52 AM     Renal ultrasound:  Right kidney 10.3, left kidney 12.8 cm with no evidence of hydronephrosis.  He has a right as well as a left renal cyst.      ASSESSMENT      1.  CKD stage IIIb with a baseline creatinine of 1.5-1.6 for a number of years now.  This is likely from nephrosclerosis.  No evidence of obstruction or hydronephrosis noted on imaging studies.  2. HTN: Blood pressures are controlled  3.  Admitted to the hospital with epistaxis with a known history of ITP, he was profoundly thrombocytopenic on admission  4.  Status post cardiac cath showing nonobstructive coronary artery disease.  5.  Mild bump in his serum creatinine likely from prerenal insult.  Creatinine essentially unchanged post cath from 1.8-1.9 range.  Anticipate this will get better by tomorrow.  6.  Patient is being considered for laparoscopic splenectomy because of profound thrombocytopenia.    PLAN      1.  Agree with fluids for 24 hours   2.  Patient apparently used to be transferred to [facility] for a lap splenectomy.  Will follow over there  3. Follow up labs ordered.   4. Following along       Please do not hesitate to call with questions.    This note is created with the assistance of a speech-recognition  program. While intending to generate a document that actually reflects the content of the visit, no guarantees can be provided that every mistake has been identified and corrected by editing    Electronically signed by Binh Bazan MD on 6/3/2025 at 9:22 AM

## 2025-06-03 NOTE — PROGRESS NOTES
General Surgery:  Daily Progress Note                   PATIENT NAME: Bruno Morales     TODAY'S DATE: 6/3/2025, 7:20 AM  CC: I am doing better today.    SUBJECTIVE:     Patient was seen and evaluated at bedside.  Afebrile, vital signs normal limits.  Plan to continue to trend up.  No acute events overnight.  Doing well status post cardiac cath.  Overall low risk for splenectomy per cardiology    OBJECTIVE:   VITALS:  BP (!) 123/51   Pulse 56   Temp 97.3 °F (36.3 °C) (Oral)   Resp 16   Ht 1.778 m (5' 10\")   Wt 70.6 kg (155 lb 10.3 oz)   SpO2 95%   BMI 22.33 kg/m²      INTAKE/OUTPUT:      Intake/Output Summary (Last 24 hours) at 6/3/2025 0720  Last data filed at 6/3/2025 0617  Gross per 24 hour   Intake 240 ml   Output 930 ml   Net -690 ml       PHYSICAL EXAM:  General Appearance: awake, alert, oriented, in no acute distress  HEENT:  Normocephalic, atraumatic, mucus membranes moist   Heart: Heart regular rate and rhythm  Lungs: Normal expansion of chest wall  Abdomen: Soft, nontender, nondistended  Extremities: No cyanosis, pitting edema, rashes noted.    Skin: Skin color, texture, turgor normal. No rashes or lesions.    IV Access:  PIV    Data:  CBC with Differential:    Lab Results   Component Value Date/Time    WBC 2.7 06/03/2025 04:51 AM    RBC 3.47 06/03/2025 04:51 AM    HGB 10.0 06/03/2025 04:51 AM    HCT 29.9 06/03/2025 04:51 AM     06/03/2025 04:51 AM    MCV 86.3 06/03/2025 04:51 AM    MCH 28.7 06/03/2025 04:51 AM    MCHC 33.3 06/03/2025 04:51 AM    RDW 16.8 06/03/2025 04:51 AM    NRBC 1 07/04/2024 04:20 AM    METASPCT 2 03/05/2025 10:40 AM    LYMPHOPCT 6 06/03/2025 04:51 AM    MONOPCT 1 06/03/2025 04:51 AM    MYELOPCT 2 07/02/2024 06:44 PM    EOSPCT 0 06/03/2025 04:51 AM    BASOPCT 0 06/03/2025 04:51 AM    MONOSABS 0.03 06/03/2025 04:51 AM    LYMPHSABS 0.16 06/03/2025 04:51 AM    EOSABS 0.00 06/03/2025 04:51 AM    BASOSABS 0.00 06/03/2025 04:51 AM    DIFFTYPE NOT REPORTED 10/23/2015 08:40  evaluate for laparoscopic splenectomy.      Electronically signed by Josiah Riley IV, DO  on 6/3/2025 at 3:02 PM

## 2025-06-03 NOTE — PROGRESS NOTES
[facility]  Office: [phone]  Phani Andino, DO, Carlos A Hernandez, DO, Sandor Rivera DO, Dre Marroquin, DO, Cecelia Murillo MD, Rosie Robbins MD, Castro Hope MD, Becka Sharma MD,  Wolf Reardon MD, Edna Truong MD, Jamee Padilla MD,  Bia Weir DO, Jacob Riley MD, Rk Be MD, Diego Andino DO, Viviana Marroquin MD,  Jesse Mahan DO, Paula Ramsay MD, Alyssia Henley MD, Chari Pastor MD,  Reynold Lennon MD, Luca Eckert MD, Speedy Durham MD, Devyn Moreno MD, Heber Valenzuela MD, David Paiz MD, Eagle Whitt, DO, Maile Miranda MD, Peggy Villagran DO, Sebastian Braswell MD, Bia Griffith MD, Mohsin Reza, MD, Queta Yao MD, Shirley Waterhouse, CNP,  Darlyn Savage, CNP, Eagle Yoon, CNP,  Luzmaria Mitchell, DMITRIY, Tonja Vizcaino, CNP, Tiff Hamilton, CNP, Glenna Taylor, CNP, Lili Cool, CNP, Amina Harrell, PA-C, Letitia Lynch, CNP, Violette Romo, CNP,  Jonna Galvez, CNP, Viviana Ludwig, CNP, Hector Jackson, PA-C, Kelly Minor, CNP,  Lluvia Anand, CNS, Marilynn Acuña, CNP, Ashley Benítez, CNP,   Poppy Hughes, CNP         [facility]   IN-PATIENT SERVICE   [facility]    Progress Note    6/3/2025    9:23 AM    Name:   Bruno Morales  MRN:     [id number]     Acct:      [id number]   Room:   338/338-01  IP Day:  4  Admit Date:  5/30/2025  2:50 PM    PCP:   Margarette Vallejo MD  Code Status:  Full Code    Subjective:     Patient seen in follow-up for refractory ITP, patient states \"I am doing okay\"    Patient status postcardiac catheterization which did not reveal any significant disease.  He has been cleared for splenectomy from a cardiac standpoint.  Surgery has reached out to  who is able to perform the splenectomy for the patient at [facility].  This has been discussed with the patient and he is agreeable to transfer to [facility].  At this point in time I have reached out to my

## 2025-06-03 NOTE — PLAN OF CARE
Problem: Discharge Planning  Goal: Discharge to home or other facility with appropriate resources  Outcome: Progressing  Flowsheets (Taken 6/2/2025 2058)  Discharge to home or other facility with appropriate resources: Identify barriers to discharge with patient and caregiver     Problem: ABCDS Injury Assessment  Goal: Absence of physical injury  Outcome: Progressing     Problem: Safety - Adult  Goal: Free from fall injury  Outcome: Progressing     Problem: Musculoskeletal - Adult  Goal: Return mobility to safest level of function  Outcome: Progressing  Flowsheets (Taken 6/2/2025 2058)  Return Mobility to Safest Level of Function: Assess patient stability and activity tolerance for standing, transferring and ambulating with or without assistive devices     Problem: Hematologic - Adult  Goal: Maintains hematologic stability  Outcome: Progressing  Flowsheets (Taken 6/2/2025 2058)  Maintains hematologic stability: Assess for signs and symptoms of bleeding or hemorrhage

## 2025-06-03 NOTE — PROGRESS NOTES
Cardiology Progress Note                       Date:   6/3/2025  Patient name: Bruno Morales  Date of admission:  5/30/2025  2:50 PM  MRN:   [id number]  YOB: 1950  PCP: Margarette Vallejo MD    Reason for Admission:  ITP, epistaxis     Subjective:       There were no acute events overnight, remained hemodynamically stable, denies chest pain, dyspnea, orthopnea or palpitations.  Right radial artery access site soft with no hematoma  Telemetry shows sinus rhythm with intermittent vent ectopy   Platelets 124 K and cr 1.9 this am    Scheduled Meds:   pantoprazole  40 mg Oral QAM AC    amiodarone  200 mg Oral Daily    atorvastatin  10 mg Oral Nightly    brimonidine  1 drop Left Eye BID    busPIRone  5 mg Oral TID    carvedilol  12.5 mg Oral BID WC    fenofibrate  160 mg Oral Daily    Fostamatinib Disodium  100 mg Oral BID    lisinopril  40 mg Oral Daily    sodium chloride flush  5-40 mL IntraVENous 2 times per day    dexAMETHasone  40 mg IntraVENous Daily    cetirizine  10 mg Oral Daily    amLODIPine  10 mg Oral Nightly       Continuous Infusions:   sodium chloride 75 mL/hr at 06/03/25 0346    sodium chloride         Labs:     CBC:   Recent Labs     06/01/25  0659 06/02/25  0951 06/03/25  0451   WBC 4.0 5.8 2.7*   HGB 10.6* 11.0* 10.0*   PLT 64* 131* 124*     BMP:    Recent Labs     06/01/25  0659 06/03/25  0451    142   K 4.2 4.3    113*   CO2 17* 21   BUN 50* 54*   CREATININE 1.8* 1.9*   GLUCOSE 159* 160*     Hepatic: No results for input(s): \"AST\", \"ALT\", \"BILITOT\", \"ALKPHOS\" in the last 72 hours.    Invalid input(s): \"ALB\"  Troponin: No results for input(s): \"TROPONINI\" in the last 72 hours.  BNP: No results for input(s): \"BNP\" in the last 72 hours.  Lipids: No results for input(s): \"CHOL\", \"HDL\" in the last 72 hours.    Invalid input(s): \"LDLCALCU\"  INR: No results for input(s): \"INR\" in the last 72 hours.      Objective:     Vitals: BP (!) 123/51   Pulse 56   Temp

## 2025-06-03 NOTE — DISCHARGE SUMMARY
[facility]  Office: [phone]  Phani Andino DO, Carlos A Hernandez DO, Sandor Rivera DO, Dre Marroquin DO, Cecelia Murillo MD, Rosie Robbins MD, Castro Hope MD, Becka Sharma MD,  Wolf Reardon MD, Edna Truong MD, Jamee Padilla MD,  Bia Weir DO, Jacob Riley MD, Rk Be MD, Diego Andino DO, Viviana Marroquin MD,  Jesse Mahan DO, Paula Ramsay MD, Alyssia Henley MD, Chari Pastor MD,  Reynold Lennon MD, Luca Eckert MD, Speedy Durham MD, Devyn Moreno MD, Heber Valenzuela MD, David Paiz MD, Eagle Whitt, DO, Maile Miranda MD, Peggy Villagran DO, Sebastian Braswell MD, Bia Griffith MD, Mohsin Reza, MD, Queta Yao MD, Shirley Waterhouse, CNP,  Darlyn Savage, CNP, Eagle Yoon, CNP,  Luzmaria Mitchell, DMITRIY, Tonja Vizcaino, CNP, Tiff Hamilton, CNP, Glenna Taylor, CNP, Lili Cool, CNP, Amina Harrell, PA-C, Letitia Lynch, CNP, Violette Romo, CNP,  Jonna Galvez, CNP, Viviana Ludwig, CNP, Hector Jackson, PA-C, Kelly Minor, CNP,  Lluvia Anand, CNS, Marilynn Acuña, CNP, Ashley Benítez, CNP,   Poppy Hughes, CNP         [facility]   IN-PATIENT SERVICE   [facility]    Discharge Summary     Patient ID: Bruno Morales  :  1950   MRN: [id number]     ACCOUNT:  [id number]   Patient's PCP: Margarette Vallejo MD  Admit Date: 2025   Discharge Date: 6/3/2025  Length of Stay: 4  Code Status:  Full Code  Admitting Physician: Rk Be MD  Discharge Physician: Diego Andino DO     Active Discharge Diagnoses:     Hospital Problem Lists:  Principal Problem:    Acute idiopathic thrombocytopenic purpura (HCC)  Active Problems:    Preop cardiovascular exam    Abnormal stress test    Essential hypertension    Pure hypercholesterolemia    Stage 3b chronic kidney disease (HCC)    PVC (premature ventricular contraction)    Low platelet count    Dyslipidemia    Epistaxis    History of ITP  Resolved Problems:    * No resolved hospital  problems. *      Admission Condition:  fair     Discharged Condition: good    Hospital Stay:     Hospital Course:  Bruno Morales is a 74 y.o. male who was admitted for the management of Acute idiopathic thrombocytopenic purpura (HCC) , presented to ER with Epistaxis (Patient states he started having a nose bleed in his left nare around 0530 this morning. Patient was able to clot it off this morning but then started eating and the nose bleed started again. Patient has hx of ITP. )    This very pleasant 74-year-old male presented the hospital with epistaxis.  The patient was found to be significantly thrombocytopenic secondary to his ITP.  The patient has refractory ITP that has not been amenable to medical therapies.  Recommendations per hematology are for splenectomy however he has had a recent positive stress test.  The patient underwent treatment with IVIG and dexamethasone and ultimately underwent left-sided heart catheterization which revealed nonocclusive disease.  Recommendations were for transfer to [facility] for patient to undergo splenectomy with Dr. Mota.  Patient is transferred in stable condition.    Significant therapeutic interventions: As above    Significant Diagnostic Studies:   Labs:  Hematology:  Recent Labs     06/01/25  0659 06/02/25  0951 06/03/25  0451   WBC 4.0 5.8 2.7*   RBC 3.72* 3.87* 3.47*   HGB 10.6* 11.0* 10.0*   HCT 31.8* 33.3* 29.9*   MCV 85.4 86.1 86.3   MCH 28.5 28.5 28.7   MCHC 33.4 33.1 33.3   RDW 16.3* 16.8* 16.8*   PLT 64* 131* 124*   MPV 10.6 9.6 9.2     Chemistry:  Recent Labs     06/01/25  0659 06/03/25  0451    142   K 4.2 4.3    113*   CO2 17* 21   GLUCOSE 159* 160*   BUN 50* 54*   CREATININE 1.8* 1.9*   ANIONGAP 13 8*   LABGLOM 39* 36*   CALCIUM 8.8 8.0*     Recent Labs     06/02/25  0809   POCGLU 123*     ABG:No results found for: \"POCPH\", \"PHART\", \"PH\", \"POCPCO2\", \"KOU6TNE\", \"PCO2\", \"POCPO2\", \"PO2ART\", \"PO2\", \"POCHCO3\",

## 2025-06-03 NOTE — PROGRESS NOTES
Today's Date:  6/1/2025  Patient Name: Bruno Morales  Date of admission: 5/30/2025  2:50 PM  Patient's age: 74 y.o., 1950  Admission Dx: Epistaxis [R04.0]  Acute idiopathic thrombocytopenic purpura (HCC) [D69.3]  Low platelet count [D69.6]    Reason for Consult: Recurrent resistant ITP  Requesting Physician: Rk Be MD    CHIEF COMPLAINT: Severe thrombocytopenia       SUBJECTIVE:  .  Patient was seen and evaluated.  He is clinically stable.  Cardiac workup appreciated.  Low risk for surgical intervention.  Plan is to transfer to [facility] for splenectomy hopefully tomorrow.  Platelets continue to rise and they are above 100 K    BRIEF CASE HISTORY:    The patient is a 74 y.o.  male who is admitted to the hospital for severe thrombocytopenia and nose bleeding, patient had history of ITP with no response initially to steroid and short response to IVIG back in June 2024 and recurred on rituximab quickly and currently on avatrombopag started on September 2024 with maximizing dose on February, then transitioned to Fostamatinib. Unfortunately without response.   Bone marrow was done earlier last year and was negative,   CT scan showed multiple and lymphadenopathy concerning for lymphomatous masses, especially in mediastinum and retroperitoneum    Pt received steroids and IVIG , platelets are responding.    Past Medical History:   has a past medical history of Hyperlipidemia, Hypertension, and Thrombocytopenia.    Past Surgical History:   has a past surgical history that includes Tonsillectomy; CT BIOPSY BONE MARROW (7/3/2024); and Cardiac procedure (N/A, 6/2/2025).     Medications:    Reviewed in Epic     Allergies:  Asa [aspirin], Fish-derived products, Penicillins, and Tetracyclines & related    Social History:   reports that he has never smoked. He has never used smokeless tobacco. He reports that he does not drink alcohol and does not use drugs.     Family History: family history  5 years ago and the other one 2 years ago.)   He is not sure if he has ever received meningococcal or haemophilus vaccination                         Bia Jaime MD  Hematologist/Medical Oncologist  [facility]                         This note is created with the assistance of a speech recognition program.  While intending to generate a document that actually reflects the content of the visit, the document can still have some errors including those of syntax and sound a like substitutions which may escape proof reading.  It such instances, actual meaning can be extrapolated by contextual diversion.

## 2025-06-03 NOTE — PROGRESS NOTES
Pt transferred from room 342 to room 338. ICU nurse needed room 342 for blue-tooth vital monitoring system for patient.  Patient understanding and settled in room 338. No further needs at this time.

## 2025-06-04 ENCOUNTER — ANESTHESIA (OUTPATIENT)
Dept: OPERATING ROOM | Age: 75
End: 2025-06-04
Payer: MEDICARE

## 2025-06-04 ENCOUNTER — ANESTHESIA EVENT (OUTPATIENT)
Dept: OPERATING ROOM | Age: 75
End: 2025-06-04
Payer: MEDICARE

## 2025-06-04 ENCOUNTER — APPOINTMENT (OUTPATIENT)
Dept: GENERAL RADIOLOGY | Age: 75
DRG: 800 | End: 2025-06-04
Attending: INTERNAL MEDICINE
Payer: MEDICARE

## 2025-06-04 PROBLEM — I49.3 PVC'S (PREMATURE VENTRICULAR CONTRACTIONS): Status: ACTIVE | Noted: 2025-06-04

## 2025-06-04 PROBLEM — F41.9 ANXIETY: Status: ACTIVE | Noted: 2025-06-04

## 2025-06-04 LAB
ANION GAP SERPL CALCULATED.3IONS-SCNC: 8 MMOL/L (ref 9–16)
BASOPHILS # BLD: 0 K/UL (ref 0–0.2)
BASOPHILS NFR BLD: 0 % (ref 0–2)
BUN SERPL-MCNC: 49 MG/DL (ref 8–23)
CALCIUM SERPL-MCNC: 8.2 MG/DL (ref 8.6–10.4)
CHLORIDE SERPL-SCNC: 111 MMOL/L (ref 98–107)
CO2 SERPL-SCNC: 22 MMOL/L (ref 20–31)
CREAT SERPL-MCNC: 1.5 MG/DL (ref 0.7–1.2)
EOSINOPHIL # BLD: 0 K/UL (ref 0–0.44)
EOSINOPHILS RELATIVE PERCENT: 0 % (ref 1–4)
ERYTHROCYTE [DISTWIDTH] IN BLOOD BY AUTOMATED COUNT: 15.4 % (ref 11.8–14.4)
GFR, ESTIMATED: 48 ML/MIN/1.73M2
GLUCOSE SERPL-MCNC: 131 MG/DL (ref 74–99)
HCT VFR BLD AUTO: 31.3 % (ref 40.7–50.3)
HGB BLD-MCNC: 10.3 G/DL (ref 13–17)
IMM GRANULOCYTES # BLD AUTO: 0.03 K/UL (ref 0–0.3)
IMM GRANULOCYTES NFR BLD: 1 %
LYMPHOCYTES NFR BLD: 0.16 K/UL (ref 1.1–3.7)
LYMPHOCYTES RELATIVE PERCENT: 5 % (ref 24–43)
MCH RBC QN AUTO: 28.9 PG (ref 25.2–33.5)
MCHC RBC AUTO-ENTMCNC: 32.9 G/DL (ref 28.4–34.8)
MCV RBC AUTO: 87.7 FL (ref 82.6–102.9)
MONOCYTES NFR BLD: 0.06 K/UL (ref 0.1–1.2)
MONOCYTES NFR BLD: 2 % (ref 3–12)
MORPHOLOGY: ABNORMAL
NEUTROPHILS NFR BLD: 92 % (ref 36–65)
NEUTS SEG NFR BLD: 2.85 K/UL (ref 1.5–8.1)
NRBC BLD-RTO: 0.6 PER 100 WBC
PLATELET # BLD AUTO: 155 K/UL (ref 138–453)
PMV BLD AUTO: 11.6 FL (ref 8.1–13.5)
POTASSIUM SERPL-SCNC: 4.3 MMOL/L (ref 3.7–5.3)
RBC # BLD AUTO: 3.57 M/UL (ref 4.21–5.77)
SODIUM SERPL-SCNC: 141 MMOL/L (ref 136–145)
WBC OTHER # BLD: 3.1 K/UL (ref 3.5–11.3)

## 2025-06-04 PROCEDURE — 6370000000 HC RX 637 (ALT 250 FOR IP)

## 2025-06-04 PROCEDURE — 2580000003 HC RX 258: Performed by: NURSE ANESTHETIST, CERTIFIED REGISTERED

## 2025-06-04 PROCEDURE — 7100000001 HC PACU RECOVERY - ADDTL 15 MIN: Performed by: SURGERY

## 2025-06-04 PROCEDURE — 6360000002 HC RX W HCPCS: Performed by: NURSE ANESTHETIST, CERTIFIED REGISTERED

## 2025-06-04 PROCEDURE — 2500000003 HC RX 250 WO HCPCS: Performed by: SURGERY

## 2025-06-04 PROCEDURE — 07TP4ZZ RESECTION OF SPLEEN, PERCUTANEOUS ENDOSCOPIC APPROACH: ICD-10-PCS | Performed by: SURGERY

## 2025-06-04 PROCEDURE — 7100000000 HC PACU RECOVERY - FIRST 15 MIN: Performed by: SURGERY

## 2025-06-04 PROCEDURE — 6370000000 HC RX 637 (ALT 250 FOR IP): Performed by: HOSPITALIST

## 2025-06-04 PROCEDURE — 99223 1ST HOSP IP/OBS HIGH 75: CPT | Performed by: INTERNAL MEDICINE

## 2025-06-04 PROCEDURE — 3600000009 HC SURGERY ROBOT BASE: Performed by: SURGERY

## 2025-06-04 PROCEDURE — 2500000003 HC RX 250 WO HCPCS

## 2025-06-04 PROCEDURE — 8E0W4CZ ROBOTIC ASSISTED PROCEDURE OF TRUNK REGION, PERCUTANEOUS ENDOSCOPIC APPROACH: ICD-10-PCS | Performed by: SURGERY

## 2025-06-04 PROCEDURE — 6360000002 HC RX W HCPCS: Performed by: ANESTHESIOLOGY

## 2025-06-04 PROCEDURE — 2060000002 HC BURN ICU INTERMEDIATE R&B

## 2025-06-04 PROCEDURE — 88305 TISSUE EXAM BY PATHOLOGIST: CPT

## 2025-06-04 PROCEDURE — 3700000000 HC ANESTHESIA ATTENDED CARE: Performed by: SURGERY

## 2025-06-04 PROCEDURE — 3700000001 HC ADD 15 MINUTES (ANESTHESIA): Performed by: SURGERY

## 2025-06-04 PROCEDURE — 2500000003 HC RX 250 WO HCPCS: Performed by: NURSE ANESTHETIST, CERTIFIED REGISTERED

## 2025-06-04 PROCEDURE — 80048 BASIC METABOLIC PNL TOTAL CA: CPT

## 2025-06-04 PROCEDURE — 6360000002 HC RX W HCPCS: Performed by: SURGERY

## 2025-06-04 PROCEDURE — 3600000019 HC SURGERY ROBOT ADDTL 15MIN: Performed by: SURGERY

## 2025-06-04 PROCEDURE — 94761 N-INVAS EAR/PLS OXIMETRY MLT: CPT

## 2025-06-04 PROCEDURE — 85025 COMPLETE CBC W/AUTO DIFF WBC: CPT

## 2025-06-04 PROCEDURE — 86900 BLOOD TYPING SEROLOGIC ABO: CPT

## 2025-06-04 PROCEDURE — 86923 COMPATIBILITY TEST ELECTRIC: CPT

## 2025-06-04 PROCEDURE — C1889 IMPLANT/INSERT DEVICE, NOC: HCPCS | Performed by: SURGERY

## 2025-06-04 PROCEDURE — 2500000003 HC RX 250 WO HCPCS: Performed by: ANESTHESIOLOGY

## 2025-06-04 PROCEDURE — S2900 ROBOTIC SURGICAL SYSTEM: HCPCS | Performed by: SURGERY

## 2025-06-04 PROCEDURE — 99232 SBSQ HOSP IP/OBS MODERATE 35: CPT | Performed by: INTERNAL MEDICINE

## 2025-06-04 PROCEDURE — 36415 COLL VENOUS BLD VENIPUNCTURE: CPT

## 2025-06-04 PROCEDURE — 2720000010 HC SURG SUPPLY STERILE: Performed by: SURGERY

## 2025-06-04 PROCEDURE — APPSS30 APP SPLIT SHARED TIME 16-30 MINUTES

## 2025-06-04 PROCEDURE — 71045 X-RAY EXAM CHEST 1 VIEW: CPT

## 2025-06-04 PROCEDURE — 86850 RBC ANTIBODY SCREEN: CPT

## 2025-06-04 PROCEDURE — 86901 BLOOD TYPING SEROLOGIC RH(D): CPT

## 2025-06-04 PROCEDURE — 2709999900 HC NON-CHARGEABLE SUPPLY: Performed by: SURGERY

## 2025-06-04 DEVICE — HEMOLOK L 6 CLIPS/CART
Type: IMPLANTABLE DEVICE | Site: ABDOMEN | Status: FUNCTIONAL
Brand: WECK

## 2025-06-04 RX ORDER — OXYCODONE AND ACETAMINOPHEN 5; 325 MG/1; MG/1
2 TABLET ORAL EVERY 4 HOURS PRN
Status: DISCONTINUED | OUTPATIENT
Start: 2025-06-04 | End: 2025-06-06

## 2025-06-04 RX ORDER — SODIUM CHLORIDE, SODIUM LACTATE, POTASSIUM CHLORIDE, CALCIUM CHLORIDE 600; 310; 30; 20 MG/100ML; MG/100ML; MG/100ML; MG/100ML
INJECTION, SOLUTION INTRAVENOUS
Status: DISCONTINUED | OUTPATIENT
Start: 2025-06-04 | End: 2025-06-04 | Stop reason: SDUPTHER

## 2025-06-04 RX ORDER — NALOXONE HYDROCHLORIDE 0.4 MG/ML
INJECTION, SOLUTION INTRAMUSCULAR; INTRAVENOUS; SUBCUTANEOUS PRN
Status: DISCONTINUED | OUTPATIENT
Start: 2025-06-04 | End: 2025-06-04

## 2025-06-04 RX ORDER — SODIUM CHLORIDE 9 MG/ML
INJECTION, SOLUTION INTRAVENOUS
Status: DISCONTINUED | OUTPATIENT
Start: 2025-06-04 | End: 2025-06-04 | Stop reason: SDUPTHER

## 2025-06-04 RX ORDER — FENTANYL CITRATE 50 UG/ML
50 INJECTION, SOLUTION INTRAMUSCULAR; INTRAVENOUS EVERY 5 MIN PRN
Status: DISCONTINUED | OUTPATIENT
Start: 2025-06-04 | End: 2025-06-04

## 2025-06-04 RX ORDER — DEXAMETHASONE SODIUM PHOSPHATE 10 MG/ML
INJECTION, SOLUTION INTRA-ARTICULAR; INTRALESIONAL; INTRAMUSCULAR; INTRAVENOUS; SOFT TISSUE
Status: DISCONTINUED | OUTPATIENT
Start: 2025-06-04 | End: 2025-06-04 | Stop reason: SDUPTHER

## 2025-06-04 RX ORDER — ONDANSETRON 2 MG/ML
INJECTION INTRAMUSCULAR; INTRAVENOUS
Status: DISCONTINUED | OUTPATIENT
Start: 2025-06-04 | End: 2025-06-04 | Stop reason: SDUPTHER

## 2025-06-04 RX ORDER — EPHEDRINE SULFATE/0.9% NACL/PF 25 MG/5 ML
SYRINGE (ML) INTRAVENOUS
Status: DISCONTINUED | OUTPATIENT
Start: 2025-06-04 | End: 2025-06-04 | Stop reason: SDUPTHER

## 2025-06-04 RX ORDER — SODIUM CHLORIDE 0.9 % (FLUSH) 0.9 %
5-40 SYRINGE (ML) INJECTION PRN
Status: DISCONTINUED | OUTPATIENT
Start: 2025-06-04 | End: 2025-06-04

## 2025-06-04 RX ORDER — MAGNESIUM HYDROXIDE 1200 MG/15ML
LIQUID ORAL CONTINUOUS PRN
Status: DISCONTINUED | OUTPATIENT
Start: 2025-06-04 | End: 2025-06-04 | Stop reason: HOSPADM

## 2025-06-04 RX ORDER — SODIUM CHLORIDE 0.9 % (FLUSH) 0.9 %
5-40 SYRINGE (ML) INJECTION EVERY 12 HOURS SCHEDULED
Status: DISCONTINUED | OUTPATIENT
Start: 2025-06-04 | End: 2025-06-04

## 2025-06-04 RX ORDER — INDOCYANINE GREEN AND WATER 25 MG
KIT INJECTION
Status: DISCONTINUED | OUTPATIENT
Start: 2025-06-04 | End: 2025-06-04 | Stop reason: SDUPTHER

## 2025-06-04 RX ORDER — PROPOFOL 10 MG/ML
INJECTION, EMULSION INTRAVENOUS
Status: DISCONTINUED | OUTPATIENT
Start: 2025-06-04 | End: 2025-06-04 | Stop reason: SDUPTHER

## 2025-06-04 RX ORDER — FENOFIBRATE 54 MG/1
50 TABLET ORAL DAILY
Status: DISCONTINUED | OUTPATIENT
Start: 2025-06-05 | End: 2025-06-06 | Stop reason: HOSPADM

## 2025-06-04 RX ORDER — PHENYLEPHRINE HCL IN 0.9% NACL 1 MG/10 ML
SYRINGE (ML) INTRAVENOUS
Status: DISCONTINUED | OUTPATIENT
Start: 2025-06-04 | End: 2025-06-04 | Stop reason: SDUPTHER

## 2025-06-04 RX ORDER — FENTANYL CITRATE 50 UG/ML
INJECTION, SOLUTION INTRAMUSCULAR; INTRAVENOUS
Status: DISCONTINUED | OUTPATIENT
Start: 2025-06-04 | End: 2025-06-04 | Stop reason: SDUPTHER

## 2025-06-04 RX ORDER — ROCURONIUM BROMIDE 10 MG/ML
INJECTION, SOLUTION INTRAVENOUS
Status: DISCONTINUED | OUTPATIENT
Start: 2025-06-04 | End: 2025-06-04 | Stop reason: SDUPTHER

## 2025-06-04 RX ORDER — ONDANSETRON 2 MG/ML
4 INJECTION INTRAMUSCULAR; INTRAVENOUS
Status: DISCONTINUED | OUTPATIENT
Start: 2025-06-04 | End: 2025-06-04

## 2025-06-04 RX ORDER — OXYCODONE AND ACETAMINOPHEN 5; 325 MG/1; MG/1
1 TABLET ORAL EVERY 4 HOURS PRN
Status: DISCONTINUED | OUTPATIENT
Start: 2025-06-04 | End: 2025-06-06 | Stop reason: HOSPADM

## 2025-06-04 RX ORDER — CEFAZOLIN SODIUM 1 G/3ML
INJECTION, POWDER, FOR SOLUTION INTRAMUSCULAR; INTRAVENOUS
Status: DISCONTINUED | OUTPATIENT
Start: 2025-06-04 | End: 2025-06-04 | Stop reason: SDUPTHER

## 2025-06-04 RX ORDER — SODIUM CHLORIDE 9 MG/ML
INJECTION, SOLUTION INTRAVENOUS PRN
Status: DISCONTINUED | OUTPATIENT
Start: 2025-06-04 | End: 2025-06-04

## 2025-06-04 RX ORDER — BUPIVACAINE HYDROCHLORIDE 5 MG/ML
INJECTION, SOLUTION PERINEURAL PRN
Status: DISCONTINUED | OUTPATIENT
Start: 2025-06-04 | End: 2025-06-04 | Stop reason: HOSPADM

## 2025-06-04 RX ORDER — FENTANYL CITRATE 50 UG/ML
25 INJECTION, SOLUTION INTRAMUSCULAR; INTRAVENOUS EVERY 5 MIN PRN
Status: DISCONTINUED | OUTPATIENT
Start: 2025-06-04 | End: 2025-06-04

## 2025-06-04 RX ADMIN — Medication 50 MCG: at 16:31

## 2025-06-04 RX ADMIN — LISINOPRIL 40 MG: 20 TABLET ORAL at 09:16

## 2025-06-04 RX ADMIN — EPHEDRINE SULFATE 5 MG: 5 INJECTION INTRAVENOUS at 16:15

## 2025-06-04 RX ADMIN — PROPOFOL 150 MG: 10 INJECTION, EMULSION INTRAVENOUS at 15:54

## 2025-06-04 RX ADMIN — CEFAZOLIN 2 G: 1 INJECTION, POWDER, FOR SOLUTION INTRAMUSCULAR; INTRAVENOUS at 16:02

## 2025-06-04 RX ADMIN — FENTANYL CITRATE 50 MCG: 50 INJECTION, SOLUTION INTRAMUSCULAR; INTRAVENOUS at 18:58

## 2025-06-04 RX ADMIN — EPHEDRINE SULFATE 5 MG: 5 INJECTION INTRAVENOUS at 17:01

## 2025-06-04 RX ADMIN — BRIMONIDINE TARTRATE 1 DROP: 2 SOLUTION OPHTHALMIC at 21:04

## 2025-06-04 RX ADMIN — Medication 50 MCG: at 16:36

## 2025-06-04 RX ADMIN — FENTANYL CITRATE 50 MCG: 50 INJECTION, SOLUTION INTRAMUSCULAR; INTRAVENOUS at 16:00

## 2025-06-04 RX ADMIN — SODIUM CHLORIDE: 9 INJECTION, SOLUTION INTRAVENOUS at 15:45

## 2025-06-04 RX ADMIN — ROCURONIUM BROMIDE 10 MG: 10 INJECTION INTRAVENOUS at 16:26

## 2025-06-04 RX ADMIN — ONDANSETRON 4 MG: 2 INJECTION INTRAMUSCULAR; INTRAVENOUS at 18:34

## 2025-06-04 RX ADMIN — Medication 50 MCG: at 16:35

## 2025-06-04 RX ADMIN — CARVEDILOL 12.5 MG: 12.5 TABLET, FILM COATED ORAL at 21:04

## 2025-06-04 RX ADMIN — FENTANYL CITRATE 50 MCG: 50 INJECTION, SOLUTION INTRAMUSCULAR; INTRAVENOUS at 18:43

## 2025-06-04 RX ADMIN — CETIRIZINE HYDROCHLORIDE 10 MG: 10 TABLET, FILM COATED ORAL at 21:04

## 2025-06-04 RX ADMIN — ROCURONIUM BROMIDE 50 MG: 10 INJECTION INTRAVENOUS at 15:55

## 2025-06-04 RX ADMIN — AMLODIPINE BESYLATE 10 MG: 10 TABLET ORAL at 21:04

## 2025-06-04 RX ADMIN — FENOFIBRATE 160 MG: 160 TABLET ORAL at 09:16

## 2025-06-04 RX ADMIN — FENTANYL CITRATE 50 MCG: 50 INJECTION, SOLUTION INTRAMUSCULAR; INTRAVENOUS at 15:57

## 2025-06-04 RX ADMIN — EPHEDRINE SULFATE 5 MG: 5 INJECTION INTRAVENOUS at 16:36

## 2025-06-04 RX ADMIN — Medication 50 MCG: at 16:29

## 2025-06-04 RX ADMIN — BUSPIRONE HYDROCHLORIDE 5 MG: 5 TABLET ORAL at 09:16

## 2025-06-04 RX ADMIN — PANTOPRAZOLE SODIUM 40 MG: 40 TABLET, DELAYED RELEASE ORAL at 06:20

## 2025-06-04 RX ADMIN — ATORVASTATIN CALCIUM 10 MG: 20 TABLET, FILM COATED ORAL at 21:04

## 2025-06-04 RX ADMIN — AMIODARONE HYDROCHLORIDE 200 MG: 200 TABLET ORAL at 09:17

## 2025-06-04 RX ADMIN — SODIUM CHLORIDE, POTASSIUM CHLORIDE, SODIUM LACTATE AND CALCIUM CHLORIDE: 600; 310; 30; 20 INJECTION, SOLUTION INTRAVENOUS at 15:45

## 2025-06-04 RX ADMIN — SODIUM CHLORIDE, PRESERVATIVE FREE 10 ML: 5 INJECTION INTRAVENOUS at 21:04

## 2025-06-04 RX ADMIN — FENTANYL CITRATE 50 MCG: 50 INJECTION, SOLUTION INTRAMUSCULAR; INTRAVENOUS at 16:24

## 2025-06-04 RX ADMIN — ROCURONIUM BROMIDE 10 MG: 10 INJECTION INTRAVENOUS at 17:56

## 2025-06-04 RX ADMIN — BUSPIRONE HYDROCHLORIDE 5 MG: 5 TABLET ORAL at 21:04

## 2025-06-04 RX ADMIN — ROCURONIUM BROMIDE 10 MG: 10 INJECTION INTRAVENOUS at 16:47

## 2025-06-04 RX ADMIN — BRIMONIDINE TARTRATE 1 DROP: 2 SOLUTION OPHTHALMIC at 09:17

## 2025-06-04 RX ADMIN — ROCURONIUM BROMIDE 10 MG: 10 INJECTION INTRAVENOUS at 17:26

## 2025-06-04 RX ADMIN — CARVEDILOL 12.5 MG: 12.5 TABLET, FILM COATED ORAL at 09:16

## 2025-06-04 RX ADMIN — DEXAMETHASONE SODIUM PHOSPHATE 5 MG: 10 INJECTION INTRAMUSCULAR; INTRAVENOUS at 16:47

## 2025-06-04 RX ADMIN — INDOCYANINE GREEN AND WATER 7.5 MG: KIT at 17:46

## 2025-06-04 RX ADMIN — SUGAMMADEX 200 MG: 100 INJECTION, SOLUTION INTRAVENOUS at 19:03

## 2025-06-04 RX ADMIN — EPHEDRINE SULFATE 5 MG: 5 INJECTION INTRAVENOUS at 16:29

## 2025-06-04 RX ADMIN — Medication 100 MCG: at 16:49

## 2025-06-04 RX ADMIN — EPHEDRINE SULFATE 5 MG: 5 INJECTION INTRAVENOUS at 16:13

## 2025-06-04 RX ADMIN — PROPOFOL 50 MG: 10 INJECTION, EMULSION INTRAVENOUS at 15:57

## 2025-06-04 RX ADMIN — FENTANYL CITRATE 50 MCG: 50 INJECTION, SOLUTION INTRAMUSCULAR; INTRAVENOUS at 17:56

## 2025-06-04 ASSESSMENT — PAIN - FUNCTIONAL ASSESSMENT: PAIN_FUNCTIONAL_ASSESSMENT: 0-10

## 2025-06-04 NOTE — PLAN OF CARE
Problem: Safety - Adult  Goal: Free from fall injury  6/4/2025 1522 by Glenna Russell, RN  Outcome: Progressing  6/4/2025 0509 by Charlene Song, RN  Outcome: Progressing     Problem: Discharge Planning  Goal: Discharge to home or other facility with appropriate resources  Outcome: Progressing     Problem: Pain  Goal: Verbalizes/displays adequate comfort level or baseline comfort level  Outcome: Progressing

## 2025-06-04 NOTE — ANESTHESIA PROCEDURE NOTES
Arterial Line:    An arterial line was placed using surface landmarks, in the OR for the following indication(s): continuous blood pressure monitoring and blood sampling needed.    A 20 gauge (size), 4.45 cm (length), Arrow (type) catheter was placed, Seldinger technique used, into the left radial artery, secured by tape and Tegaderm.  Anesthesia type: General    Events:  patient tolerated procedure well with no complications.6/4/2025 3:55 PM6/4/2025 3:57 PM  Anesthesiologist: Saurav Sanchez MD  Performed: Anesthesiologist   Preanesthetic Checklist  Completed: patient identified, IV checked, site marked, risks and benefits discussed, surgical/procedural consents, equipment checked, pre-op evaluation, timeout performed, anesthesia consent given, oxygen available, monitors applied/VS acknowledged, fire risk safety assessment completed and verbalized and blood product R/B/A discussed and consented

## 2025-06-04 NOTE — CARE COORDINATION
Case Management Assessment  Initial Evaluation    Date/Time of Evaluation: 6/4/2025 0941 AM  Assessment Completed by: Glenis Zuniga    If patient is discharged prior to next notation, then this note serves as note for discharge by case management.    Patient Name: Bruno Morales                   YOB: 1950  Diagnosis: Thrombocytopenia [D69.6]                   Date / Time: 6/3/2025  6:53 PM    Patient Admission Status: Inpatient   Readmission Risk (Low < 19, Mod (19-27), High > 27): Readmission Risk Score: 23.3    Current PCP: Margarette Vallejo MD  PCP verified by CM? (P) Yes (Dr Margarette Vallejo)    Chart Reviewed: Yes      History Provided by: (P) Patient  Patient Orientation: (P) Alert and Oriented, Person, Place, Situation, Self    Patient Cognition: (P) Alert    Hospitalization in the last 30 days (Readmission):  No    If yes, Readmission Assessment in CM Navigator will be completed.    Advance Directives:      Code Status: Full Code   Patient's Primary Decision Maker is: (P) Legal Next of Kin    Primary Decision Maker: Kristine Morales - Spouse - [phone]    Secondary Decision Maker: Rob Ireland - Other - [phone]    Discharge Planning:    Patient lives with: (P) Spouse/Significant Other Type of Home: (P) House  Primary Care Giver: (P) Spouse  Patient Support Systems include: (P) Spouse/Significant Other   Current Financial resources: (P) Medicare  Current community resources: (P) None  Current services prior to admission: (P) Durable Medical Equipment            Current DME: (P) Cane, Shower Chair, Walker (has bathroom grab bars)            Type of Home Care services:  (P) None    ADLS  Prior functional level: (P) Assistance with the following:, Cooking, Housework, Shopping  Current functional level: (P) Assistance with the following:, Bathing, Dressing, Toileting, Cooking, Housework, Shopping, Mobility, Other (see comment) (needs assist d/t hospitalzation)    PT AM-PAC:   /24  OT

## 2025-06-04 NOTE — PLAN OF CARE
Creatinine at baseline for his CKD3b. Nephrology will sign off. Please call with questions.  Dalia Zabala MD  [facility]

## 2025-06-04 NOTE — PROGRESS NOTES
Today's Date:  6/1/2025  Patient Name: Bruno Morales  Date of admission: 6/3/2025  6:53 PM  Patient's age: 74 y.o., 1950  Admission Dx: Thrombocytopenia [D69.6]    Reason for Consult: Recurrent resistant ITP  Requesting Physician: Bia MEDEIROS DO    CHIEF COMPLAINT: Severe thrombocytopenia       SUBJECTIVE:  .  Patient was seen and evaluated.  He is clinically stable.  Cardiac workup appreciated.  Low risk for surgical intervention.  Plan for surgical intervention today, the patient feels well and is ready to proceed  Platelets 155  BRIEF CASE HISTORY:    The patient is a 74 y.o.  male who is admitted to the hospital for severe thrombocytopenia and nose bleeding, patient had history of ITP with no response initially to steroid and short response to IVIG back in June 2024 and recurred on rituximab quickly and currently on avatrombopag started on September 2024 with maximizing dose on February, then transitioned to Fostamatinib. Unfortunately without response.   Bone marrow was done earlier last year and was negative,   CT scan showed multiple and lymphadenopathy concerning for lymphomatous masses, especially in mediastinum and retroperitoneum    Pt received steroids and IVIG , platelets are responding.    Past Medical History:   has a past medical history of Hyperlipidemia, Hypertension, and Thrombocytopenia.    Past Surgical History:   has a past surgical history that includes Tonsillectomy; CT BIOPSY BONE MARROW (7/3/2024); and Cardiac procedure (N/A, 6/2/2025).     Medications:    Reviewed in Epic     Allergies:  Asa [aspirin], Fish-derived products, Penicillins, and Tetracyclines & related    Social History:   reports that he has never smoked. He has never used smokeless tobacco. He reports that he does not drink alcohol and does not use drugs.     Family History: family history includes Dementia in his mother; Parkinson's Disease in his father.    REVIEW OF SYSTEMS:    14 point review  Pancytopenia (HCC) [D61.818] 06/22/2024    Stage 3b chronic kidney disease (HCC) [N18.32] 06/15/2024    Essential hypertension [I10] 05/16/2016         IMPRESSION:   Recurrent refractory ITP  Mediastinal lymphadenopathy  Retroperitoneal lymphadenopathy    RECOMMENDATIONS:  I reviewed the labs/imaging available to me,outside records and discussed with the patient.I explained to the patient the nature of this problem. I explained the significance of these abnormalities and possible etiology and management options   74-year-old man with recent diagnosis of ITP .  Has been through multiple therapy without any response  CT scan shows significant lymphadenopathy and lung nodules, however they have been stable over extended period of time  Status post reasonable with steroids and IVIG.  Very good response to treatment.  Platelets 131.  With recurrent and recently refractory ITP patient may benefit from splenectomy.  He was evaluated by cardiology and deemed to be low risk for surgery.    Plan splenectomy today hopefully minimal in the  Hold fostamatinib  Continue with oral steroid  Plan Nplate as outpatient even after splenectomy  Patient's questions were answered to the best of his satisfaction and he verbalized full understanding and agreement.                              Bia Jaime MD  Hematologist/Medical Oncologist  [facility]                         This note is created with the assistance of a speech recognition program.  While intending to generate a document that actually reflects the content of the visit, the document can still have some errors including those of syntax and sound a like substitutions which may escape proof reading.  It such instances, actual meaning can be extrapolated by contextual diversion.

## 2025-06-04 NOTE — PROGRESS NOTES
Pharmacy Note     Renal Dose Adjustment    Bruno Morales is a 74 y.o. male. Pharmacist assessment of renally cleared medications.    Recent Labs     06/03/25  0451 06/04/25  0555   BUN 54* 49*       Recent Labs     06/03/25  0451 06/04/25  0555   CREATININE 1.9* 1.5*       Estimated Creatinine Clearance: 44 mL/min (A) (based on SCr of 1.5 mg/dL (H)).    Height:   Ht Readings from Last 1 Encounters:   06/03/25 1.778 m (5' 10\")     Weight:  Wt Readings from Last 1 Encounters:   06/03/25 74.8 kg (165 lb)       The following medication dose has been adjusted based upon renal function per P&T Guidelines:             Fenofibrate 160 mg daily --> Fenofibrate 54 mg daily    Josue Mendez, PharmD, [facility] 6/4/2025 9:44 AM

## 2025-06-04 NOTE — H&P
[facility]  Office: [phone]  Phani Andino DO, Carlos A Hernandez, DO, Sandor Rivera DO, Dre Marroquin, DO, Cecelia Murillo MD, Rosie Robbins MD, Castro Hope MD, Becka Sharma MD,  Wolf Reardon MD, Edna Truong MD, Jamee Padilla MD,  Bia Weir DO, Jacob Riley MD, Rk Be MD, Diego Andino DO, Viviana Marroquin MD,  Jesse Mahan DO, Paula Ramsay MD, Alyssia Henley MD, Chari Pastor MD,  Reynold Lennon MD, Luca Eckert MD, Speedy Durham MD, Devyn Moreno MD, Heber Valenzuela MD, David Paiz MD, Eagle Whitt, DO, Maile Miranda MD, Peggy Villagran DO, Sebastian Braswell MD, Bia Griffith MD, Mohsin Reza, MD, Queta Yao MD, Shirley Waterhouse, CNP,  Darlyn Savage CNP, Eagle Yoon, CNP,  Luzmaria Mitchell, DMITRIY, Tonja Vizcaino CNP, Tiff Hamilton, CNP, Glenna Taylor, CNP, Lili Cool, CNP, Amina Harrell, PA-C, Letitia Lynch, CNP, Violette Romo, CNP,  Jonna Galvez, CNP, Viviana Ludwig, CNP, Hector Jackson, PA-C, Kelly Minor, CNP,  Lluvia Anand, CNS, Marilynn cAuña, CNP, Ashley Benítez, CNP,   Poppy Hughes, CNP         [facility]   IN-PATIENT SERVICE   [facility]    HISTORY AND PHYSICAL EXAMINATION            Date:   6/4/2025  Patient name:  Bruno Morales  Date of admission:  6/3/2025  6:53 PM  MRN:   [id number]  Account:  [id number]  YOB: 1950  PCP:    Margarette Vallejo MD  Room:   0163/0163-01  Code Status:    Full Code    Chief Complaint:     No chief complaint on file.      History Obtained From:     patient, spouse, electronic medical record    History of Present Illness:     Bruno Morales is a 74 y.o.  /  male with period of thrombocytopenia, essential hypertension, stage IIIb chronic kidney disease, dyslipidemia, and who presents with No chief complaint on file.   and is admitted to the hospital for the management of Thrombocytopenia.    74-year-old male patient comes to [facility] via  for splenectomy.  Continue to trend platelets with daily CBC with differential and continue with Decadron therapy.  Appreciate further management by heme-onc.  Pancytopenia: Continue to trend with daily CBC with differential.  Today's CBC values continue to show improvement from previous values.    Hypertension: Continue home amlodipine 10 mg daily, lisinopril 40 mg daily, and Coreg 12.5 mg twice daily  Dyslipidemia: Continue home atorvastatin 10 mg daily  History of PVCs: Continue with recommendations from his cardiologist continue with amiodarone  Stage IIIb chronic kidney disease: Trend daily renal function   Anxiety: Continue BuSpar 5 mg 3 times daily  Consultations:   IP CONSULT TO NEPHROLOGY  IP CONSULT TO GENERAL SURGERY  IP CONSULT TO HEM/ONC     Patient is admitted as inpatient status because of co-morbidities listed above, severity of signs and symptoms as outlined, requirement for current medical therapies and most importantly because of direct risk to patient if care not provided in a hospital setting.  Expected length of stay > 48 hours.    Hector Jackson PA-C  6/4/2025  12:01 PM    Copy sent to Margarette Cruz MD

## 2025-06-04 NOTE — OP NOTE
Operative Note      Patient: Bruno Morales  YOB: 1950  MRN: [id number]    Date of Procedure: 6/4/2025    Pre-Op Diagnosis Codes:      * Idiopathic thrombocytopenia purpura (HCC) [D69.3]    Post-Op Diagnosis: Same       Procedure(s):  ROBOTIC LAPAROSCOPIC SPLENECTOMY    Surgeon(s):  Derrick Mota DO    Assistant:   First Assistant: Radha Billingsley RN  Resident: Florecita Hatfield DO    Anesthesia: General    Estimated Blood Loss (mL): Minimal    Complications: None    Specimens:   ID Type Source Tests Collected by Time Destination   A : SPLEEN Tissue Spleen SURGICAL PATHOLOGY Derrick Mota DO 6/4/2025 1733        Implants:  Implant Name Type Inv. Item Serial No.  Lot No. LRB No. Used Action   CLIP INT L POLYMER MILENA LIG HEM O MILENA (6EA/PK) - [id number]  CLIP INT L POLYMER MILENA LIG HEM O MILENA (6EA/PK)  [facility] [id number] N/A 4 Implanted   CLIP INT L POLYMER MILENA LIG HEM O MILENA (6EA/PK) - [id number]  CLIP INT L POLYMER MILENA LIG HEM O MILENA (6EA/PK)  TELE[employer]- [id number] N/A 1 Implanted         Drains:   Closed/Suction Drain LUQ Bulb (Active)       Findings:  Infection Present At Time Of Surgery (PATOS) (choose all levels that have infection present):  No infection present  Other Findings: Hemostasis  Total splenectomy, no signs of accessory spleen    Detailed Description of Procedure:     Operative narrative:     The patient was taken to the operative suite and administered anesthesia by the anesthetic team.  Next we prepped and draped in normal sterile fashion.  Incision was then made at Garcia's point for a 12 mm port.  The abdomen was surveyed and we entered the abdomen using a optical Visiport trocar of 12 mm in size.  This was accomplished at Garcia's point.  Pneumoperitoneum was then established without complication, the underlying area surveyed.  We then placed 4 other ports in standard fashion under direct laparoscopic visualization.  Attention was then  turned towards placement of the Marley liver retractor.  Small incision made just inferior to the xiphoid process for the liver retractor.  The liver retractor was then placed under direct laparoscopic visualization in order to facilitate visualization of the stomach and hiatus.       The short gastrics then mobilized along the greater curve to visualize the splenic hilum.  There was a superior pole artery and vein that were very visible.  I dissected them out with the vessel sealer and then placed 4 clips on each.  The vessels then cut.  This devascularized the superior pole, however, the inferior half of the spleen still with blood flow.      I then mobilized the splenocolic attachments.  This allowed me into a plane along the spleen more superiorly and I could identify the main splenic artery and vein.  Once dissected I placed a white load with seam guard across the main splenic artery and vein and fired.  Hemostasis noted.     The spleen then dissected from all posterior attachments.      We placed a 19 ALLI drain in the left upper quadrant and withdrew it from the left lateral port.  The drain secured to the skin with a 2-0 nylon.      I then placed the spleen in a kidney bag in the abdomen.  The spleen crushed in the bag until we could remove it.  Specimen sent for pathology.      The drain was noted to be in proper position and again the staple line noted be hemostatic.  Next counts reported to me as correct.     The 12 mm port sites were then closed using a suture passer to pass 0 Vicryl suture under direct laparoscopic visualization for proper fascial reapproximation at each port site.  All ports were then removed and the drain secured into position using a 2-0 nylon suture.  Pneumoperitoneum was released in entirety.  The skin was then closed using 4-0 Vicryl subcuticular stitches in interrupted fashion.  The region was cleaned with sterile normal saline followed by placement of TinCoBen and Steri-Strips

## 2025-06-05 LAB
ABO/RH: NORMAL
ANION GAP SERPL CALCULATED.3IONS-SCNC: 10 MMOL/L (ref 9–16)
ANTIBODY SCREEN: NEGATIVE
ARM BAND NUMBER: NORMAL
BASOPHILS # BLD: 0 K/UL (ref 0–0.2)
BASOPHILS NFR BLD: 0 % (ref 0–2)
BLOOD BANK DISPENSE STATUS: NORMAL
BLOOD BANK DISPENSE STATUS: NORMAL
BLOOD BANK SAMPLE EXPIRATION: NORMAL
BPU ID: NORMAL
BPU ID: NORMAL
BUN SERPL-MCNC: 41 MG/DL (ref 8–23)
CALCIUM SERPL-MCNC: 8.1 MG/DL (ref 8.6–10.4)
CHLORIDE SERPL-SCNC: 110 MMOL/L (ref 98–107)
CO2 SERPL-SCNC: 22 MMOL/L (ref 20–31)
COMPONENT: NORMAL
COMPONENT: NORMAL
CREAT SERPL-MCNC: 1.4 MG/DL (ref 0.7–1.2)
CROSSMATCH RESULT: NORMAL
CROSSMATCH RESULT: NORMAL
EOSINOPHIL # BLD: 0 K/UL (ref 0–0.44)
EOSINOPHILS RELATIVE PERCENT: 0 % (ref 1–4)
ERYTHROCYTE [DISTWIDTH] IN BLOOD BY AUTOMATED COUNT: 15.6 % (ref 11.8–14.4)
GFR, ESTIMATED: 52 ML/MIN/1.73M2
GLUCOSE SERPL-MCNC: 110 MG/DL (ref 74–99)
HCT VFR BLD AUTO: 33.6 % (ref 40.7–50.3)
HCT VFR BLD AUTO: 33.7 % (ref 40.7–50.3)
HGB BLD-MCNC: 10.7 G/DL (ref 13–17)
HGB BLD-MCNC: 11.1 G/DL (ref 13–17)
IMM GRANULOCYTES # BLD AUTO: 0.1 K/UL (ref 0–0.3)
IMM GRANULOCYTES NFR BLD: 1 %
LYMPHOCYTES NFR BLD: 0.2 K/UL (ref 1.1–3.7)
LYMPHOCYTES RELATIVE PERCENT: 2 % (ref 24–43)
MCH RBC QN AUTO: 29.7 PG (ref 25.2–33.5)
MCHC RBC AUTO-ENTMCNC: 32.9 G/DL (ref 28.4–34.8)
MCV RBC AUTO: 90.1 FL (ref 82.6–102.9)
MONOCYTES NFR BLD: 0.79 K/UL (ref 0.1–1.2)
MONOCYTES NFR BLD: 8 % (ref 3–12)
MORPHOLOGY: ABNORMAL
NEUTROPHILS NFR BLD: 89 % (ref 36–65)
NEUTS SEG NFR BLD: 8.81 K/UL (ref 1.5–8.1)
NRBC BLD-RTO: 0.4 PER 100 WBC
PLATELET # BLD AUTO: 187 K/UL (ref 138–453)
PMV BLD AUTO: 11.9 FL (ref 8.1–13.5)
POTASSIUM SERPL-SCNC: 4.4 MMOL/L (ref 3.7–5.3)
RBC # BLD AUTO: 3.74 M/UL (ref 4.21–5.77)
SODIUM SERPL-SCNC: 142 MMOL/L (ref 136–145)
TRANSFUSION STATUS: NORMAL
TRANSFUSION STATUS: NORMAL
UNIT DIVISION: 0
UNIT DIVISION: 0
WBC OTHER # BLD: 9.9 K/UL (ref 3.5–11.3)

## 2025-06-05 PROCEDURE — 2060000002 HC BURN ICU INTERMEDIATE R&B

## 2025-06-05 PROCEDURE — 6370000000 HC RX 637 (ALT 250 FOR IP)

## 2025-06-05 PROCEDURE — 85018 HEMOGLOBIN: CPT

## 2025-06-05 PROCEDURE — 6360000002 HC RX W HCPCS: Performed by: NURSE PRACTITIONER

## 2025-06-05 PROCEDURE — 90472 IMMUNIZATION ADMIN EACH ADD: CPT | Performed by: NURSE PRACTITIONER

## 2025-06-05 PROCEDURE — 2500000003 HC RX 250 WO HCPCS: Performed by: NURSE PRACTITIONER

## 2025-06-05 PROCEDURE — 80048 BASIC METABOLIC PNL TOTAL CA: CPT

## 2025-06-05 PROCEDURE — 85025 COMPLETE CBC W/AUTO DIFF WBC: CPT

## 2025-06-05 PROCEDURE — G0009 ADMIN PNEUMOCOCCAL VACCINE: HCPCS | Performed by: NURSE PRACTITIONER

## 2025-06-05 PROCEDURE — 90620 MENB-4C VACCINE IM: CPT | Performed by: NURSE PRACTITIONER

## 2025-06-05 PROCEDURE — 6360000002 HC RX W HCPCS

## 2025-06-05 PROCEDURE — 2500000003 HC RX 250 WO HCPCS

## 2025-06-05 PROCEDURE — 90734 MENACWYD/MENACWYCRM VACC IM: CPT | Performed by: NURSE PRACTITIONER

## 2025-06-05 PROCEDURE — 85014 HEMATOCRIT: CPT

## 2025-06-05 PROCEDURE — 99232 SBSQ HOSP IP/OBS MODERATE 35: CPT | Performed by: INTERNAL MEDICINE

## 2025-06-05 PROCEDURE — 2580000003 HC RX 258

## 2025-06-05 PROCEDURE — 99232 SBSQ HOSP IP/OBS MODERATE 35: CPT | Performed by: NURSE PRACTITIONER

## 2025-06-05 PROCEDURE — 90677 PCV20 VACCINE IM: CPT | Performed by: NURSE PRACTITIONER

## 2025-06-05 PROCEDURE — 90471 IMMUNIZATION ADMIN: CPT | Performed by: NURSE PRACTITIONER

## 2025-06-05 PROCEDURE — 36415 COLL VENOUS BLD VENIPUNCTURE: CPT

## 2025-06-05 RX ORDER — POLYETHYLENE GLYCOL 3350 17 G/17G
17 POWDER, FOR SOLUTION ORAL DAILY
Status: DISCONTINUED | OUTPATIENT
Start: 2025-06-05 | End: 2025-06-06 | Stop reason: HOSPADM

## 2025-06-05 RX ORDER — HEPARIN SODIUM 5000 [USP'U]/ML
5000 INJECTION, SOLUTION INTRAVENOUS; SUBCUTANEOUS EVERY 8 HOURS SCHEDULED
Status: DISCONTINUED | OUTPATIENT
Start: 2025-06-05 | End: 2025-06-06 | Stop reason: HOSPADM

## 2025-06-05 RX ADMIN — CARVEDILOL 12.5 MG: 12.5 TABLET, FILM COATED ORAL at 20:33

## 2025-06-05 RX ADMIN — SODIUM CHLORIDE, PRESERVATIVE FREE 10 ML: 5 INJECTION INTRAVENOUS at 20:33

## 2025-06-05 RX ADMIN — CETIRIZINE HYDROCHLORIDE 10 MG: 10 TABLET, FILM COATED ORAL at 20:33

## 2025-06-05 RX ADMIN — NEISSERIA MENINGITIDIS SEROGROUP B NHBA FUSION PROTEIN ANTIGEN, NEISSERIA MENINGITIDIS SEROGROUP B FHBP FUSION PROTEIN ANTIGEN AND NEISSERIA MENINGITIDIS SEROGROUP B NADA PROTEIN ANTIGEN 0.5 ML: 50; 50; 50; 25 INJECTION, SUSPENSION INTRAMUSCULAR at 12:29

## 2025-06-05 RX ADMIN — POLYETHYLENE GLYCOL 3350 17 G: 17 POWDER, FOR SOLUTION ORAL at 14:01

## 2025-06-05 RX ADMIN — FENOFIBRATE 54 MG: 54 TABLET ORAL at 09:36

## 2025-06-05 RX ADMIN — HEPARIN SODIUM 5000 UNITS: 5000 INJECTION INTRAVENOUS; SUBCUTANEOUS at 20:33

## 2025-06-05 RX ADMIN — ATORVASTATIN CALCIUM 10 MG: 20 TABLET, FILM COATED ORAL at 20:33

## 2025-06-05 RX ADMIN — OXYCODONE HYDROCHLORIDE AND ACETAMINOPHEN 1 TABLET: 5; 325 TABLET ORAL at 15:14

## 2025-06-05 RX ADMIN — DEXAMETHASONE SODIUM PHOSPHATE 40 MG: 10 INJECTION INTRAMUSCULAR; INTRAVENOUS at 15:21

## 2025-06-05 RX ADMIN — BUSPIRONE HYDROCHLORIDE 5 MG: 5 TABLET ORAL at 09:18

## 2025-06-05 RX ADMIN — BUSPIRONE HYDROCHLORIDE 5 MG: 5 TABLET ORAL at 14:00

## 2025-06-05 RX ADMIN — SODIUM CHLORIDE, PRESERVATIVE FREE 10 ML: 5 INJECTION INTRAVENOUS at 09:19

## 2025-06-05 RX ADMIN — PNEUMOCOCCAL 20-VALENT CONJUGATE VACCINE 0.5 ML
2.2; 2.2; 2.2; 2.2; 2.2; 2.2; 2.2; 2.2; 2.2; 2.2; 2.2; 2.2; 2.2; 2.2; 2.2; 2.2; 4.4; 2.2; 2.2; 2.2 INJECTION, SUSPENSION INTRAMUSCULAR at 12:26

## 2025-06-05 RX ADMIN — BUSPIRONE HYDROCHLORIDE 5 MG: 5 TABLET ORAL at 20:33

## 2025-06-05 RX ADMIN — LISINOPRIL 40 MG: 20 TABLET ORAL at 09:18

## 2025-06-05 RX ADMIN — AMIODARONE HYDROCHLORIDE 200 MG: 200 TABLET ORAL at 09:18

## 2025-06-05 RX ADMIN — CARVEDILOL 12.5 MG: 12.5 TABLET, FILM COATED ORAL at 09:18

## 2025-06-05 RX ADMIN — AMLODIPINE BESYLATE 10 MG: 10 TABLET ORAL at 20:33

## 2025-06-05 RX ADMIN — HAEMOPHILUS B CONJUGATE VACCINE (TETANUS TOXOID CONJUGATE) 0.5 ML: KIT at 12:29

## 2025-06-05 RX ADMIN — BRIMONIDINE TARTRATE 1 DROP: 2 SOLUTION OPHTHALMIC at 20:33

## 2025-06-05 RX ADMIN — Medication 0.5 ML: at 12:28

## 2025-06-05 RX ADMIN — PANTOPRAZOLE SODIUM 40 MG: 40 TABLET, DELAYED RELEASE ORAL at 05:32

## 2025-06-05 RX ADMIN — BRIMONIDINE TARTRATE 1 DROP: 2 SOLUTION OPHTHALMIC at 09:19

## 2025-06-05 ASSESSMENT — PAIN DESCRIPTION - DESCRIPTORS: DESCRIPTORS: DISCOMFORT;TENDER;THROBBING

## 2025-06-05 ASSESSMENT — PAIN DESCRIPTION - ORIENTATION: ORIENTATION: MID;LEFT

## 2025-06-05 ASSESSMENT — PAIN DESCRIPTION - LOCATION: LOCATION: NECK

## 2025-06-05 ASSESSMENT — PAIN SCALES - GENERAL
PAINLEVEL_OUTOF10: 0
PAINLEVEL_OUTOF10: 4

## 2025-06-05 NOTE — CONSULTS
Comprehensive Nutrition Assessment    Type and Reason for Visit:  Initial, Consult    Nutrition Recommendations/Plan:   Regular diet.   High calorie / High protein ONS TID.      Malnutrition Assessment:  Malnutrition Status:  No malnutrition (06/05/25 4777)    Context:  Acute Illness       Nutrition Assessment:    Patient is seen today for ONS consult. Patient is a 74 y.o. male admitted for thrombocytopenia. s/p laparscopic splenectomy 6/4. Regular diet order in place. Patient eating lunch during visit. Discussed with family member, patient has been eating well. Patient drinks ensure at home and does not like chocolate flavor. Weight hx reviewed, weight stable.    Nutrition Related Findings:    Meds/labs reviewed. Last BM 6/3.         Current Nutrition Intake & Therapies:    Average Meal Intake: 51-75%, %  Average Supplements Intake: None Ordered  ADULT DIET; Regular  ADULT ORAL NUTRITION SUPPLEMENT; Breakfast, Lunch, Dinner; Standard High Calorie/High Protein Oral Supplement    Anthropometric Measures:  Height: 177.8 cm (5' 10\")  Ideal Body Weight (IBW): 166 lbs (75 kg)    Current Body Weight: 74.8 kg (164 lb 14.5 oz),   IBW.    Current BMI (kg/m2): 23.7    Estimated Daily Nutrient Needs:  Energy Requirements Based On: Kcal/kg  Weight Used for Energy Requirements: Current  Energy (kcal/day): 1850-2150kcals/d  Weight Used for Protein Requirements: Current  Protein (g/day): 80-90g protein/day  Method Used for Fluid Requirements: 1 ml/kcal  Fluid (ml/day): or per medical team    Nutrition Diagnosis:   Predicted inadequate energy intake related to acute injury/trauma as evidenced by variable po intake    Nutrition Interventions:   Food and/or Nutrient Delivery: Continue Current Diet, Start Oral Nutrition Supplement  Nutrition Education/Counseling: No recommendation at this time  Coordination of Nutrition Care: No recommendation at this time  Plan of Care discussed with: patient, patients famly  member    Goals:  Goals: Meet at least 75% of estimated needs, prior to discharge  Type of Goal: New goal  Previous Goal Met: New Goal    Nutrition Monitoring and Evaluation:   Behavioral-Environmental Outcomes: None Identified  Food/Nutrient Intake Outcomes: Supplement Intake, Food and Nutrient Intake  Physical Signs/Symptoms Outcomes: Biochemical Data, GI Status, Nutrition Focused Physical Findings, Skin, Weight    Discharge Planning:    Continue Oral Nutrition Supplement     Diego Castro RD  Contact: [phone]

## 2025-06-05 NOTE — PROGRESS NOTES
Bariatric Surgery:  Daily Progress Note          POD # 1 s/p laparoscopic splenectomy          PATIENT NAME: Bruno Morales     TODAY'S DATE: 6/5/2025, 8:29 AM  CC:  \"I feel pretty good\"    SUBJECTIVE:     Pt seen and examined at bedside.  Afebrile, vital signs stable, HTN.  Pt states overall pain is well controled.  Denies fever, chills, nausea, vomiting, chest pain, and SOB.  Tolerating regular diet.   Voiding independently. Attempted IS, educated on importance of use.     OBJECTIVE:   VITALS:  BP (!) 164/67   Pulse 62   Temp 97.8 °F (36.6 °C) (Oral)   Resp 13   Ht 1.778 m (5' 10\")   Wt 74.8 kg (165 lb)   SpO2 92%   BMI 23.68 kg/m²      INTAKE/OUTPUT:      Intake/Output Summary (Last 24 hours) at 6/5/2025 0852  Last data filed at 6/5/2025 0539  Gross per 24 hour   Intake 2009.83 ml   Output 1727 ml   Net 282.83 ml       PHYSICAL EXAM:  General Appearance: awake, alert, oriented, in no acute distress  HEENT:  Normocephalic, atraumatic, mucus membranes moist   Heart: Heart regular rate and rhythm  Lungs: no respiratory distress on 2L NC, IS 300cc  Abdomen:soft, non distended, appropriately tender to palpation  Incision: c/d/I, dermabond present, ALLI in LLQ with serosanguinous output   Extremities: No cyanosis, pitting edema, rashes noted.    Skin: Skin color, texture, turgor normal. No rashes or lesions.    Data:  CBC:   Lab Results   Component Value Date/Time    WBC 9.9 06/05/2025 06:25 AM    RBC 3.74 06/05/2025 06:25 AM    HGB 11.1 06/05/2025 06:25 AM    HCT 33.7 06/05/2025 06:25 AM    MCV 90.1 06/05/2025 06:25 AM    MCH 29.7 06/05/2025 06:25 AM    MCHC 32.9 06/05/2025 06:25 AM    RDW 15.6 06/05/2025 06:25 AM     06/05/2025 06:25 AM    MPV 11.9 06/05/2025 06:25 AM     CMP:    Lab Results   Component Value Date/Time     06/05/2025 06:25 AM    K 4.4 06/05/2025 06:25 AM     06/05/2025 06:25 AM    CO2 22 06/05/2025 06:25 AM    BUN 41 06/05/2025 06:25 AM    CREATININE 1.4 06/05/2025 06:25 AM

## 2025-06-05 NOTE — ANESTHESIA POSTPROCEDURE EVALUATION
Department of Anesthesiology  Postprocedure Note    Patient: Bruno Morales  MRN: [id number]  YOB: 1950  Date of evaluation: 6/4/2025    Procedure Summary       Date: 06/04/25 Room / Location: [address]    Anesthesia Start: 1544 Anesthesia Stop: 1918    Procedure: ROBOTIC LAPAROSCOPIC SPLENECTOMY (Abdomen) Diagnosis:       Idiopathic thrombocytopenia purpura (HCC)      (Idiopathic thrombocytopenia purpura (HCC) [D69.3])    Surgeons: Derrick Mota DO Responsible Provider: Reagan Mooney MD    Anesthesia Type: general ASA Status: 3            Anesthesia Type: No value filed.    Makenzie Phase I: Makenzie Score: 8    Makenzie Phase II:      Anesthesia Post Evaluation    Patient location during evaluation: PACU  Patient participation: complete - patient participated  Level of consciousness: awake and alert  Airway patency: patent  Nausea & Vomiting: no nausea and no vomiting  Cardiovascular status: blood pressure returned to baseline  Respiratory status: acceptable  Hydration status: euvolemic  Pain management: adequate    No notable events documented.

## 2025-06-05 NOTE — PLAN OF CARE
Problem: Safety - Adult  Goal: Free from fall injury  6/5/2025 1707 by Glenna Russell RN  Outcome: Progressing  6/5/2025 0540 by Charlene Song RN  Outcome: Progressing     Problem: Discharge Planning  Goal: Discharge to home or other facility with appropriate resources  6/5/2025 1707 by Glenna Russell RN  Outcome: Progressing  Flowsheets (Taken 6/5/2025 0800)  Discharge to home or other facility with appropriate resources:   Identify barriers to discharge with patient and caregiver   Arrange for needed discharge resources and transportation as appropriate   Identify discharge learning needs (meds, wound care, etc)   Refer to discharge planning if patient needs post-hospital services based on physician order or complex needs related to functional status, cognitive ability or social support system  6/5/2025 0540 by Charlene Song RN  Outcome: Progressing     Problem: Pain  Goal: Verbalizes/displays adequate comfort level or baseline comfort level  6/5/2025 1707 by Glenna Russell RN  Outcome: Progressing  6/5/2025 0540 by Charlene Song RN  Outcome: Progressing     Problem: Nutrition Deficit:  Goal: Optimize nutritional status  6/5/2025 1707 by Glenna Russell RN  Outcome: Progressing  6/5/2025 1400 by Diego Castro, MICA  Outcome: Progressing  Flowsheets (Taken 6/5/2025 1400)  Nutrient intake appropriate for improving, restoring, or maintaining nutritional needs: Recommend appropriate diets, oral nutritional supplements, and vitamin/mineral supplements

## 2025-06-05 NOTE — PROGRESS NOTES
[facility]  Office: [phone]  Phani Andino, DO, Carlos A Hernandez, DO, Sandor Rivera DO, Dre Marroquin, DO, Cecelia Murillo MD, Rosie Robbins MD, Castro Hope MD, Becka Sharma MD,  Wolf Reardon MD, Edna Truong MD, Jamee Padilla MD,  Bia Weir DO, Jacob Riley MD, Rk Be MD, Diego Andino DO, Viviana Marroquin MD,  Jesse Mahan DO, Paula Ramsay MD, Alyssia Henley MD, Chari Pastor MD,  Reynold Lennon MD, Luca Eckert MD, Speedy Durham MD, Devyn Moreno MD, Heber Valenzuela MD, David Paiz MD, Eagle Whitt, DO, Maile Miranda MD, Peggy Villagran DO, Sebastian Braswell MD, Bia Griffith MD, Mohsin Reza, MD, Queta Yao MD, Shirley Waterhouse, CNP,  Darlyn Savage, CNP, Eagle Yoon, CNP,  Luzmaria Mitchell, DMITRIY, Tonja Vizcaino, CNP, Tiff Hamilton, CNP, Glenna Taylor, CNP, Lili Cool, CNP, Amina Harrell, PA-C, Letitia Lynch, CNP, Violette Romo, CNP,  Jonna Galvez, CNP, Viviana Ludwig, CNP, Hector Jackson, PA-C, Kelly Minor, CNP,  Lluvia Anand, CNS, Marilynn Acuña, CNP, Ashley Benítez, CNP,   Poppy Hughes, CNP         [facility]   IN-PATIENT SERVICE   [facility]    Progress Note    6/5/2025    8:08 AM    Name:   Bruno Morales  MRN:     [id number]     Acct:      [id number]   Room:   0163/0163-01  IP Day:  2  Admit Date:  6/3/2025  6:53 PM    PCP:   Margarette Vallejo MD  Code Status:  Full Code    Subjective:     C/C: Epistaxis, transferred for splenectomy  Interval History Status: improved.     Patient seen and evaluated in room POD 1 from splenectomy.  States that he is feeling good.  No acute events overnight.  ALLI drain draining bloody drainage.    Brief History:     74-year-old patient presents as a transfer from [facility] for splenectomy.  Initially presented to [facility] hospital for epistaxis and pancytopenia.  Was seen by cardiology, nephrology and heme-onc while there.  Decision was made to transfer patient  urinary bladder. 2. Bilateral renal cysts.       Physical Examination:        General appearance:  alert, cooperative and no distress  Mental Status:  oriented to person, place and time and normal affect  Lungs:  clear to auscultation bilaterally, normal effort  Heart:  regular rate and rhythm, no murmur  Abdomen:  soft, nontender, nondistended, normal bowel sounds, no masses, hepatomegaly, splenomegaly  Extremities:  no edema, redness, tenderness in the calves  Skin:  no gross lesions, rashes, induration    Assessment:        Hospital Problems           Last Modified POA    * (Principal) Thrombocytopenia 6/3/2025 Yes    Essential hypertension 6/4/2025 Yes    Stage 3b chronic kidney disease (HCC) 6/4/2025 Yes    Pancytopenia (HCC) 6/4/2025 Yes    Dyslipidemia 6/4/2025 Yes    Anxiety 6/4/2025 Yes    PVC's (premature ventricular contractions) 6/4/2025 Yes       Plan:        ITP: Refractory to IVIG and steroids.  Laparoscopic splenectomy with ALLI drain POD 1.  Continues on steroids.  Platelet count 187 today  Pancytopenia: Normalizing  Primary hypertension/CAD/positive stress test: Had cardiac cath completed prior to surgical intervention demonstrating minimal nonobstructive CAD.  Continues on Amio, beta-blocker.  Blood pressure stable on current meds including amlodipine, lisinopril  Hypertriglyceridemia: On fenofibrate  CKD stage IIIb with a baseline creatinine between 1.5 and 1.6  Anxiety: On BuSpar  Mediastinal/retroperitoneal lymphadenopathy: Outpatient follow-up  History of CVA  Protonix, hold pharmacologic anticoagulation on hold secondary to #1  Stable for DC when cleared by surgery    CRISTHIAN Maciel NP  6/5/2025  8:08 AM

## 2025-06-05 NOTE — PROGRESS NOTES
Today's Date:  6/1/2025  Patient Name: Bruno Morales  Date of admission: 6/3/2025  6:53 PM  Patient's age: 74 y.o., 1950  Admission Dx: Thrombocytopenia [D69.6]    Reason for Consult: Recurrent resistant ITP  Requesting Physician: Chari Pastor MD    CHIEF COMPLAINT: Severe thrombocytopenia       SUBJECTIVE:  .  Patient was seen and evaluated.  Surgery went very well without any problem.  He has no nausea or vomiting.  Abdominal pain is improving.    BRIEF CASE HISTORY:    The patient is a 74 y.o.  male who is admitted to the hospital for severe thrombocytopenia and nose bleeding, patient had history of ITP with no response initially to steroid and short response to IVIG back in June 2024 and recurred on rituximab quickly and currently on avatrombopag started on September 2024 with maximizing dose on February, then transitioned to Fostamatinib. Unfortunately without response.   Bone marrow was done earlier last year and was negative,   CT scan showed multiple and lymphadenopathy concerning for lymphomatous masses, especially in mediastinum and retroperitoneum    Pt received steroids and IVIG , platelets are responding.    Past Medical History:   has a past medical history of Hyperlipidemia, Hypertension, and Thrombocytopenia.    Past Surgical History:   has a past surgical history that includes Tonsillectomy; CT BIOPSY BONE MARROW (07/03/2024); Cardiac procedure (N/A, 06/02/2025); laparoscopic splenectomy (06/04/2025); and Splenectomy (N/A, 6/4/2025).     Medications:    Reviewed in Epic     Allergies:  Asa [aspirin], Fish-derived products, Penicillins, and Tetracyclines & related    Social History:   reports that he has never smoked. He has never used smokeless tobacco. He reports that he does not drink alcohol and does not use drugs.     Family History: family history includes Dementia in his mother; Parkinson's Disease in his father.    REVIEW OF SYSTEMS:    14 point review of system has been

## 2025-06-06 VITALS
TEMPERATURE: 97.6 F | DIASTOLIC BLOOD PRESSURE: 86 MMHG | HEIGHT: 70 IN | HEART RATE: 67 BPM | BODY MASS INDEX: 23.62 KG/M2 | RESPIRATION RATE: 22 BRPM | OXYGEN SATURATION: 94 % | WEIGHT: 165 LBS | SYSTOLIC BLOOD PRESSURE: 163 MMHG

## 2025-06-06 PROBLEM — F41.9 ANXIETY: Status: RESOLVED | Noted: 2025-06-04 | Resolved: 2025-06-06

## 2025-06-06 PROBLEM — D69.6 THROMBOCYTOPENIA: Status: RESOLVED | Noted: 2025-06-03 | Resolved: 2025-06-06

## 2025-06-06 LAB
ANION GAP SERPL CALCULATED.3IONS-SCNC: 9 MMOL/L (ref 9–16)
BASOPHILS # BLD: 0 K/UL (ref 0–0.2)
BASOPHILS NFR BLD: 0 % (ref 0–2)
BUN SERPL-MCNC: 46 MG/DL (ref 8–23)
CALCIUM SERPL-MCNC: 8.7 MG/DL (ref 8.6–10.4)
CHLORIDE SERPL-SCNC: 109 MMOL/L (ref 98–107)
CO2 SERPL-SCNC: 23 MMOL/L (ref 20–31)
CREAT SERPL-MCNC: 1.3 MG/DL (ref 0.7–1.2)
EOSINOPHIL # BLD: 0 K/UL (ref 0–0.44)
EOSINOPHILS RELATIVE PERCENT: 0 % (ref 1–4)
ERYTHROCYTE [DISTWIDTH] IN BLOOD BY AUTOMATED COUNT: 15.6 % (ref 11.8–14.4)
GFR, ESTIMATED: 57 ML/MIN/1.73M2
GLUCOSE SERPL-MCNC: 149 MG/DL (ref 74–99)
HCT VFR BLD AUTO: 36.6 % (ref 40.7–50.3)
HGB BLD-MCNC: 11.9 G/DL (ref 13–17)
IMM GRANULOCYTES # BLD AUTO: 0.14 K/UL (ref 0–0.3)
IMM GRANULOCYTES NFR BLD: 1 %
LYMPHOCYTES NFR BLD: 0.14 K/UL (ref 1.1–3.7)
LYMPHOCYTES RELATIVE PERCENT: 1 % (ref 24–43)
MCH RBC QN AUTO: 29 PG (ref 25.2–33.5)
MCHC RBC AUTO-ENTMCNC: 32.5 G/DL (ref 28.4–34.8)
MCV RBC AUTO: 89.3 FL (ref 82.6–102.9)
MONOCYTES NFR BLD: 0.72 K/UL (ref 0.1–1.2)
MONOCYTES NFR BLD: 5 % (ref 3–12)
MORPHOLOGY: ABNORMAL
NEUTROPHILS NFR BLD: 93 % (ref 36–65)
NEUTS SEG NFR BLD: 13.3 K/UL (ref 1.5–8.1)
NRBC BLD-RTO: 0.9 PER 100 WBC
PLATELET # BLD AUTO: 184 K/UL (ref 138–453)
PMV BLD AUTO: 12.3 FL (ref 8.1–13.5)
POTASSIUM SERPL-SCNC: 4.1 MMOL/L (ref 3.7–5.3)
RBC # BLD AUTO: 4.1 M/UL (ref 4.21–5.77)
SODIUM SERPL-SCNC: 141 MMOL/L (ref 136–145)
WBC OTHER # BLD: 14.3 K/UL (ref 3.5–11.3)

## 2025-06-06 PROCEDURE — 6360000002 HC RX W HCPCS

## 2025-06-06 PROCEDURE — 6370000000 HC RX 637 (ALT 250 FOR IP)

## 2025-06-06 PROCEDURE — 99232 SBSQ HOSP IP/OBS MODERATE 35: CPT | Performed by: NURSE PRACTITIONER

## 2025-06-06 PROCEDURE — 80048 BASIC METABOLIC PNL TOTAL CA: CPT

## 2025-06-06 PROCEDURE — 36415 COLL VENOUS BLD VENIPUNCTURE: CPT

## 2025-06-06 PROCEDURE — 85025 COMPLETE CBC W/AUTO DIFF WBC: CPT

## 2025-06-06 PROCEDURE — 2500000003 HC RX 250 WO HCPCS

## 2025-06-06 RX ORDER — ONDANSETRON 4 MG/1
4 TABLET, FILM COATED ORAL EVERY 8 HOURS PRN
Qty: 30 TABLET | Refills: 0 | Status: SHIPPED | OUTPATIENT
Start: 2025-06-06

## 2025-06-06 RX ORDER — OXYCODONE AND ACETAMINOPHEN 5; 325 MG/1; MG/1
1 TABLET ORAL EVERY 6 HOURS PRN
Qty: 28 TABLET | Refills: 0 | Status: SHIPPED | OUTPATIENT
Start: 2025-06-06 | End: 2025-06-13

## 2025-06-06 RX ADMIN — SODIUM CHLORIDE, PRESERVATIVE FREE 10 ML: 5 INJECTION INTRAVENOUS at 09:24

## 2025-06-06 RX ADMIN — BUSPIRONE HYDROCHLORIDE 5 MG: 5 TABLET ORAL at 09:22

## 2025-06-06 RX ADMIN — BUSPIRONE HYDROCHLORIDE 5 MG: 5 TABLET ORAL at 13:32

## 2025-06-06 RX ADMIN — FENOFIBRATE 54 MG: 54 TABLET ORAL at 09:22

## 2025-06-06 RX ADMIN — AMIODARONE HYDROCHLORIDE 200 MG: 200 TABLET ORAL at 09:22

## 2025-06-06 RX ADMIN — LISINOPRIL 40 MG: 20 TABLET ORAL at 09:22

## 2025-06-06 RX ADMIN — PANTOPRAZOLE SODIUM 40 MG: 40 TABLET, DELAYED RELEASE ORAL at 06:26

## 2025-06-06 RX ADMIN — HEPARIN SODIUM 5000 UNITS: 5000 INJECTION INTRAVENOUS; SUBCUTANEOUS at 06:26

## 2025-06-06 RX ADMIN — BRIMONIDINE TARTRATE 1 DROP: 2 SOLUTION OPHTHALMIC at 09:23

## 2025-06-06 ASSESSMENT — PAIN SCALES - GENERAL: PAINLEVEL_OUTOF10: 0

## 2025-06-06 NOTE — PROGRESS NOTES
[facility]  Office: [phone]  Phani Andino, DO, Carlos A Hernandez, DO, Sandor Rivera DO, Dre Marroquin, DO, Cecelia Murillo MD, Rosie Robbins MD, Castro Hope MD, Becka Sharma MD,  Wolf Reardon MD, Edna Truong MD, Jamee Padilla MD,  Bia Weir DO, Jacob Rilye MD, Rk Be MD, Diego Andino DO, Viviana Marroquin MD,  Jesse Mahan DO, Paula Ramsay MD, Alyssia Henley MD, Chari Pastor MD,  Reynold Lennno MD, Luca Eckert MD, Speedy Durham MD, Devyn Moreno MD, Heber Valenzuela MD, David Paiz MD, Eagle Whitt, DO, Maile Miranda MD, Peggy Villagran DO, Sebastian Braswell MD, iBa Griffith MD, Mohsin Reza, MD, Queta Yao MD, Shirley Waterhouse, CNP,  Darlyn Savage, CNP, Eagle Yoon, CNP,  Luzmaria Mitchell, DMITRIY, Tonja Vizcaino, CNP, Tiff Hamilton, CNP, Glenna Taylor, CNP, Lili Cool, CNP, Amina Harrell, PA-C, Letitia Lynch, CNP, Violette Romo, CNP,  Jonna Galvez, CNP, Viviana Ludwig, CNP, Hector Jackson, PA-C, Kelly Minor, CNP,  Lluvia Anand, CNS, Marilynn Acuña, CNP, Ashley Benítez, CNP,   Poppy Hughes, CNP         [facility]   IN-PATIENT SERVICE   [facility]    Progress Note    6/6/2025    7:42 AM    Name:   Bruno Morales  MRN:     [id number]     Acct:      [id number]   Room:   0163/0163-01   Day:  3  Admit Date:  6/3/2025  6:53 PM    PCP:   Margarette Vallejo MD  Code Status:  Full Code    Subjective:     C/C: Epistaxis, transferred for splenectomy  Interval History Status: improved.     Patient seen and evaluated in room sitting in bed eating breakfast in no acute distress.  Vital signs stable.  No patient concerns at this time.  WBCs increasing significantly in comparison to yesterday however patient is afebrile  ALLI drain with 245 mL out over the last 24 hours    Brief History:     74-year-old patient presents as a transfer from [facility] for splenectomy.  Initially presented to [facility] for  alcohol and does not use drugs.     Family History:   Family History   Problem Relation Age of Onset    Dementia Mother     Parkinson's Disease Father        Vitals:  BP (!) 160/90   Pulse 67   Temp 97.6 °F (36.4 °C) (Axillary)   Resp 16   Ht 1.778 m (5' 10\")   Wt 74.8 kg (165 lb)   SpO2 93%   BMI 23.68 kg/m²   Temp (24hrs), Av °F (36.7 °C), Min:97.6 °F (36.4 °C), Max:98.4 °F (36.9 °C)    No results for input(s): \"POCGLU\" in the last 72 hours.      I/O (24Hr):    Intake/Output Summary (Last 24 hours) at 2025 0742  Last data filed at 2025 0652  Gross per 24 hour   Intake 410 ml   Output 1170 ml   Net -760 ml       Labs:  Hematology:  Recent Labs     25  0555 25  0040 25  0625 25  0639   WBC 3.1*  --  9.9 14.3*   RBC 3.57*  --  3.74* 4.10*   HGB 10.3* 10.7* 11.1* 11.9*   HCT 31.3* 33.6* 33.7* 36.6*   MCV 87.7  --  90.1 89.3   MCH 28.9  --  29.7 29.0   MCHC 32.9  --  32.9 32.5   RDW 15.4*  --  15.6* 15.6*     --  187 184   MPV 11.6  --  11.9 12.3     Chemistry:  Recent Labs     25  0555 25  0625 25  0639    142 141   K 4.3 4.4 4.1   * 110* 109*   CO2 22 22 23   GLUCOSE 131* 110* 149*   BUN 49* 41* 46*   CREATININE 1.5* 1.4* 1.3*   ANIONGAP 8* 10 9   LABGLOM 48* 52* 57*   CALCIUM 8.2* 8.1* 8.7     No results for input(s): \"LABALBU\", \"LABA1C\", \"P4BCRWQ\", \"FT4\", \"TSH\", \"AST\", \"ALT\", \"LDH\", \"GGT\", \"ALKPHOS\", \"BILITOT\", \"BILIDIR\", \"AMMONIA\", \"AMYLASE\", \"LIPASE\", \"LACTATE\", \"CHOL\", \"HDL\", \"CHOLHDLRATIO\", \"TRIG\", \"VLDL\", \"DKN72QE\", \"PHENYTOIN\", \"PHENYF\", \"URICACID\", \"POCGLU\" in the last 72 hours.    Invalid input(s): \"PROT\", \"M8AROSS\", \"LABGGT\", \"LDLCHOLESTEROL\"    ABG:No results found for: \"POCPH\", \"PHART\", \"PH\", \"POCPCO2\", \"VQZ0VDI\", \"PCO2\", \"POCPO2\", \"PO2ART\", \"PO2\", \"POCHCO3\", \"YKO6DLK\", \"HCO3\", \"NBEA\", \"PBEA\", \"BEART\", \"BE\", \"THGBART\", \"THB\", \"ODU8UIZ\", \"GVPU0BVN\", \"Y3EPSQVW\", \"O2SAT\", \"FIO2\"  Lab Results   Component Value Date/Time

## 2025-06-06 NOTE — PROGRESS NOTES
Bariatric Surgery:  Daily Progress Note          POD # 2 s/p laparoscopic splenectomy          PATIENT NAME: Bruno Morales     TODAY'S DATE: 6/6/2025, 6:48 AM  CC:  \"I feel good\"    SUBJECTIVE:     Pt seen and examined at bedside.  Afebrile, vital signs stable.  Denies any abdominal pain.   Denies fever, chills, nausea, vomiting, chest pain, and SOB.  Ambulating, voiding appropriately, endorses passing flatus no BM. Wife is a retired respiratory therapist and is working with him on IS. ALLI drain with 220cc serosanguinous output.     OBJECTIVE:   VITALS:  BP (!) 160/90   Pulse 70   Temp 97.6 °F (36.4 °C) (Axillary)   Resp 18   Ht 1.778 m (5' 10\")   Wt 74.8 kg (165 lb)   SpO2 93%   BMI 23.68 kg/m²      INTAKE/OUTPUT:      Intake/Output Summary (Last 24 hours) at 6/6/2025 0648  Last data filed at 6/6/2025 0415  Gross per 24 hour   Intake 410 ml   Output 945 ml   Net -535 ml       PHYSICAL EXAM:  General Appearance: awake, alert, oriented, in no acute distress  HEENT:  Normocephalic, atraumatic, mucus membranes moist   Heart: Heart regular rate and rhythm  Lungs: no respiratory distress, ORA  Abdomen:soft, distended, appropriately tender to palpation  Incision: c/d/I, dermabond present, ALLI in LLQ with serosanguinous output   Extremities: No cyanosis, pitting edema, rashes noted.    Skin: Skin color, texture, turgor normal. No rashes or lesions.    Data:  CBC:   Lab Results   Component Value Date/Time    WBC 9.9 06/05/2025 06:25 AM    RBC 3.74 06/05/2025 06:25 AM    HGB 11.1 06/05/2025 06:25 AM    HCT 33.7 06/05/2025 06:25 AM    MCV 90.1 06/05/2025 06:25 AM    MCH 29.7 06/05/2025 06:25 AM    MCHC 32.9 06/05/2025 06:25 AM    RDW 15.6 06/05/2025 06:25 AM     06/05/2025 06:25 AM    MPV 11.9 06/05/2025 06:25 AM     CMP:    Lab Results   Component Value Date/Time     06/05/2025 06:25 AM    K 4.4 06/05/2025 06:25 AM     06/05/2025 06:25 AM    CO2 22 06/05/2025 06:25 AM    BUN 41 06/05/2025 06:25 AM  suction  Okay for shower   DC planning:  Likely ok for dc from surgical perspective today following Dr. Mota evaluation    Electronically signed by Florecita Hatfield DO  on 6/6/2025 at 6:48 AM

## 2025-06-06 NOTE — PLAN OF CARE
Problem: Safety - Adult  Goal: Free from fall injury  6/6/2025 1305 by Pham Becerra RN  Outcome: Progressing  6/6/2025 0625 by Charlene Song RN  Outcome: Progressing     Problem: Discharge Planning  Goal: Discharge to home or other facility with appropriate resources  6/6/2025 1305 by Pham Becerra RN  Outcome: Progressing  6/6/2025 0625 by Charlene Song RN  Outcome: Progressing     Problem: Pain  Goal: Verbalizes/displays adequate comfort level or baseline comfort level  6/6/2025 1305 by Pham Becerra RN  Outcome: Progressing  6/6/2025 0625 by Charlene Song RN  Outcome: Progressing     Problem: Nutrition Deficit:  Goal: Optimize nutritional status  6/6/2025 1305 by Pham Becerra RN  Outcome: Progressing  6/6/2025 0625 by Charlene Song RN  Outcome: Progressing

## 2025-06-06 NOTE — PLAN OF CARE
Problem: Safety - Adult  Goal: Free from fall injury  6/6/2025 0625 by Charlene Song RN  Outcome: Progressing  6/5/2025 1707 by Glnena Russell RN  Outcome: Progressing     Problem: Discharge Planning  Goal: Discharge to home or other facility with appropriate resources  6/6/2025 0625 by Cahrlene Song RN  Outcome: Progressing  6/5/2025 1707 by Glenna Russell RN  Outcome: Progressing  Flowsheets (Taken 6/5/2025 0800)  Discharge to home or other facility with appropriate resources:   Identify barriers to discharge with patient and caregiver   Arrange for needed discharge resources and transportation as appropriate   Identify discharge learning needs (meds, wound care, etc)   Refer to discharge planning if patient needs post-hospital services based on physician order or complex needs related to functional status, cognitive ability or social support system     Problem: Pain  Goal: Verbalizes/displays adequate comfort level or baseline comfort level  6/6/2025 0625 by Charlene Song RN  Outcome: Progressing  6/5/2025 1707 by Glenna Russell RN  Outcome: Progressing     Problem: Nutrition Deficit:  Goal: Optimize nutritional status  6/6/2025 0625 by Charlene Song RN  Outcome: Progressing  6/5/2025 1707 by Glenna Russell RN  Outcome: Progressing

## 2025-06-06 NOTE — DISCHARGE INSTRUCTIONS
Discharge Instructions for Surgery          Home Care     It is important to keep the incisions clean and dry to promote healing.   Shower with soap and rinse and dry thoroughly.  If incisions are oozing, you may cover with clean gauze.  Use the incentive spirometer every couple of hours. This is to make sure you are breathing deeply and keeping the air sacs within your lungs as open as possible to prevent respiratory problems.      Diet    Regular    Physical Activity    When home, we recommend that you take frequent walks, as tolerated, to prevent complications and increase your endurance.   Ask your doctor when you will be able to return to work. You may need to wait 2-6 weeks.    Do not drive unless you are no longer using pain medications  Do not lift anything over ten pounds for 4 weeks.      Medications    See list    Lifestyle Changes    Changing your diet and level of activity are the biggest lifestyle changes associated with your success. Be aware that you may have emotional ups and downs after this surgery.       Follow-up   Follow up with your primary care physician in one week.   Follow up with Dr. Mota in 1 week. If you don't already have a scheduled  appointment, please call the office at [phone].    Call Your Doctor If Any of the Following Occurs   Monitor your recovery once you leave the hospital. If any of the following occur, call your doctor:   Signs of infection, including fever above 100.5F    Redness, swelling, increasing pain, excessive bleeding, or any discharge from the incision site   Persistent nausea and/or vomiting   Pain that you can't control with the medications you've been given   Shortness of breath and/or chest pain   Tachycardia (racing heart sensation) unrelieved by rest  Pain, redness and/or swelling in your feet or legs    Sudden onset of severe left shoulder pain or severe abdominal pain  Any symptoms that are causing you concern   In case of an emergency, call [phone]

## 2025-06-06 NOTE — DISCHARGE SUMMARY
[facility]  Office: [phone]  Phani Andino DO, Carlos A Hernandez, DO, Sandor Rivera DO, Dre Marroquin DO, Cecelia Murillo MD, Rosie Robbins MD, Castro Hope MD, Becka Sharma MD,  Wolf Reardon MD, Edna Truong MD, Jamee Padilla MD,  Bia Weir DO, Jacob Riley MD, Rk Be MD, Diego Andino DO, Viviana Marroquin MD,  Jesse Mahan DO, Paula Ramsay MD, Alyssia Henley MD, Chari Pastor MD,  Reynold Lennon MD, Luca Eckert MD, Speedy Durham MD, Devyn Moreno MD, Heber Valenzuela MD, David Paiz MD, Eagle Whitt, DO, Maile Miranda MD, Peggy Villagran DO, Sebastian Braswell MD, Bia Griffith MD, Mohsin Reza, MD, Queta Yao MD, Shirley Waterhouse, CNP,  Darlyn Savage CNP, Eagle Yoon, CNP,  Luzmaria Mitchell, DMITRIY, Tonja Vizcaino CNP, Tiff Hamilton, CNP, Glenna Taylor, CNP, Lili Cool, CNP, Amina Harrell, PA-C, Letitia Lynch, CNP, Violette Romo, CNP,  Jonna Galvez, CNP, Viviana Ludwig, CNP, Hector Jackson, PA-C, Kelly Minor, CNP,  Lluvia Anand, CNS, Marilynn Acuña, CNP, Ashley Benítez, CNP,   Poppy Hughes, CNP         [facility]   IN-PATIENT SERVICE   [facility]    Discharge Summary     Patient ID: Bruno Morales  :  1950   MRN: [id number]     ACCOUNT:  [id number]   Patient's PCP: Margarette Vallejo MD  Admit Date: 6/3/2025   Discharge Date: 2025     Length of Stay: 3  Code Status:  Full Code  Admitting Physician: No admitting provider for patient encounter.  Discharge Physician: CRISTHIAN Maciel NP     Active Discharge Diagnoses:     Hospital Problem Lists:  Principal Problem (Resolved):    Thrombocytopenia  Active Problems:    Essential hypertension    Stage 3b chronic kidney disease (HCC)    Pancytopenia (HCC)    Dyslipidemia    PVC's (premature ventricular contractions)  Resolved Problems:    Anxiety      Admission Condition:  fair     Discharged Condition: stable    Hospital Stay:     Hospital Course:   as: Percocet  Take 1 tablet by mouth every 6 hours as needed for Pain for up to 7 days. Intended supply: 7 days. Take lowest dose possible to manage pain Max Daily Amount: 4 tablets            CONTINUE taking these medications      amiodarone 200 MG tablet  Commonly known as: CORDARONE  Take 1 tablet by mouth daily     amLODIPine 10 MG tablet  Commonly known as: NORVASC  Take 1 tablet by mouth daily     atorvastatin 10 MG tablet  Commonly known as: LIPITOR  Take 1 tablet by mouth nightly     b complex vitamins capsule     brimonidine 0.1 % Soln  Commonly known as: ALPHAGAN P     busPIRone 5 MG tablet  Commonly known as: BUSPAR     carvedilol 12.5 MG tablet  Commonly known as: COREG  Take 1 tablet by mouth 2 times daily (with meals)     ENSURE ACTIVE PO     fenofibrate 160 MG tablet     lisinopril 40 MG tablet  Commonly known as: PRINIVIL;ZESTRIL     loratadine 10 MG capsule  Commonly known as: CLARITIN     magnesium oxide 400 MG tablet  Commonly known as: MAG-OX  Take 1 tablet by mouth daily     omeprazole 40 MG delayed release capsule  Commonly known as: PRILOSEC  Take 1 capsule by mouth daily            STOP taking these medications      Avatrombopag Maleate 20 MG Tabs               Where to Get Your Medications        These medications were sent to [address] -  [phone] - F [phone]  [address] [place] [id number]      Phone: [phone]   ondansetron 4 MG tablet  oxyCODONE-acetaminophen 5-325 MG per tablet         No discharge procedures on file.    Time Spent on discharge is  34 mins in patient examination, evaluation, counseling as well as medication reconciliation, prescriptions for required medications, discharge plan and follow up.    Electronically signed by   CRISTHIAN Maciel NP  6/6/2025  3:17 PM      Thank you Margarette Cruz MD for the opportunity to be involved in this patient's care.

## 2025-06-06 NOTE — PROGRESS NOTES
Spiritual Health History and Assessment/Progress Note  [facility]    (P) Follow-up,  , (P) Life Adjustments,      Name: Bruno Morales MRN: [id number]    Age: 74 y.o.     Sex: male   Language: English   Synagogue: Non-Mosque   Thrombocytopenia     Date: 6/5/2025            Total Time Calculated: (P) 20 min              Spiritual Assessment began in [facility]        Referral/Consult From: (P) Rounding   Encounter Overview/Reason: (P) Follow-up  Service Provided For: (P) Patient    Patient is s/p splenectomy. Writer and patient engaged in an uplifting conversation. Writer provided support through active listening and words of affirmation.     Elodia, Belief, Meaning:   Patient identifies as spiritual and is connected with a elodia tradition or spiritual practice  Family/Friends No family/friends present      Importance and Influence:  Patient has no beliefs influential to healthcare decision-making identified during this visit  Family/Friends No family/friends present    Community:  Patient is connected with a spiritual community and feels well-supported. Support system includes: Spouse/Partner, Children, Elodia Community, and Friends  Family/Friends No family/friends present    Assessment and Plan of Care:     Patient Interventions include: Facilitated life review and/ or legacy and Other: Expressed hope in being discharged tomorrow  Family/Friends Interventions include: No family/friends present    Patient Plan of Care: No spiritual needs identified for follow-up  Family/Friends Plan of Care: No family/friends present    Electronically signed by Jo Grey,  Intern on 6/5/2025 at 8:32 PM

## 2025-06-06 NOTE — PROGRESS NOTES
RN went over discharge paperwork in full, all questions answered. Iv's removed without complications. Patient left with all personal belongings and meds to beds. Patient wheeled off unit by Lottie.

## 2025-06-06 NOTE — CARE COORDINATION
Met with  the patient to discuss the transition plan. States his wife will transport him home. IMM explained and copy provided to the patient.    Discharge Report    [facility]  Clinical Case Management Department  Written by: ALMA MON    Patient Name: Bruno Morales  Attending Provider: Jacob Riley MD  Admit Date: 6/3/2025  6:53 PM  MRN: [id number]  Account: [id number]                     : 1950  Discharge Date: 2025      Disposition: home    ALMA MON

## 2025-06-06 NOTE — PROGRESS NOTES
CLINICAL PHARMACY NOTE: MEDS TO BEDS    Total # of Prescriptions Filled: 2   The following medications were delivered to the patient:  Ondansetron  Oxycodone/apap    Additional Documentation:

## 2025-06-09 LAB — SURGICAL PATHOLOGY REPORT: NORMAL

## 2025-06-12 ENCOUNTER — OFFICE VISIT (OUTPATIENT)
Dept: BARIATRICS/WEIGHT MGMT | Age: 75
End: 2025-06-12

## 2025-06-12 ENCOUNTER — HOSPITAL ENCOUNTER (OUTPATIENT)
Age: 75
Discharge: HOME | End: 2025-06-12
Payer: MEDICARE

## 2025-06-12 ENCOUNTER — OFFICE VISIT (OUTPATIENT)
Age: 75
End: 2025-06-12
Payer: MEDICARE

## 2025-06-12 ENCOUNTER — TELEPHONE (OUTPATIENT)
Age: 75
End: 2025-06-12

## 2025-06-12 VITALS
SYSTOLIC BLOOD PRESSURE: 144 MMHG | HEART RATE: 66 BPM | BODY MASS INDEX: 22.05 KG/M2 | DIASTOLIC BLOOD PRESSURE: 78 MMHG | WEIGHT: 154 LBS | HEIGHT: 70 IN | OXYGEN SATURATION: 96 % | TEMPERATURE: 97.7 F

## 2025-06-12 VITALS
RESPIRATION RATE: 18 BRPM | DIASTOLIC BLOOD PRESSURE: 78 MMHG | BODY MASS INDEX: 22.1 KG/M2 | HEART RATE: 66 BPM | OXYGEN SATURATION: 96 % | TEMPERATURE: 97.7 F | SYSTOLIC BLOOD PRESSURE: 144 MMHG | WEIGHT: 154 LBS

## 2025-06-12 DIAGNOSIS — D69.3 IDIOPATHIC THROMBOCYTOPENIC PURPURA (HCC): Primary | ICD-10-CM

## 2025-06-12 DIAGNOSIS — D69.3 ACUTE IDIOPATHIC THROMBOCYTOPENIC PURPURA (HCC): ICD-10-CM

## 2025-06-12 DIAGNOSIS — Z90.81 S/P SPLENECTOMY: Primary | ICD-10-CM

## 2025-06-12 LAB
BASOPHILS # BLD: 0 K/UL (ref 0–0.2)
BASOPHILS NFR BLD: 0 % (ref 0–2)
EOSINOPHIL # BLD: 0.21 K/UL (ref 0–0.4)
EOSINOPHILS RELATIVE PERCENT: 2 % (ref 1–4)
ERYTHROCYTE [DISTWIDTH] IN BLOOD BY AUTOMATED COUNT: 17.9 % (ref 12.5–15.4)
HCT VFR BLD AUTO: 37.6 % (ref 41–53)
HGB BLD-MCNC: 12 G/DL (ref 13.5–17.5)
LYMPHOCYTES NFR BLD: 0.53 K/UL (ref 1–4.8)
LYMPHOCYTES RELATIVE PERCENT: 5 % (ref 24–44)
MCH RBC QN AUTO: 28.6 PG (ref 26–34)
MCHC RBC AUTO-ENTMCNC: 32.1 G/DL (ref 31–37)
MCV RBC AUTO: 89.3 FL (ref 80–100)
MONOCYTES NFR BLD: 1.26 K/UL (ref 0.1–1.2)
MONOCYTES NFR BLD: 12 % (ref 2–11)
MORPHOLOGY: ABNORMAL
NEUTROPHILS NFR BLD: 81 % (ref 36–66)
NEUTS SEG NFR BLD: 8.5 K/UL (ref 1.8–7.7)
PLATELET # BLD AUTO: 80 K/UL (ref 140–450)
PMV BLD AUTO: 13.3 FL (ref 6–12)
RBC # BLD AUTO: 4.21 M/UL (ref 4.5–5.9)
WBC OTHER # BLD: 10.5 K/UL (ref 3.5–11)

## 2025-06-12 PROCEDURE — 3078F DIAST BP <80 MM HG: CPT | Performed by: INTERNAL MEDICINE

## 2025-06-12 PROCEDURE — 1123F ACP DISCUSS/DSCN MKR DOCD: CPT | Performed by: INTERNAL MEDICINE

## 2025-06-12 PROCEDURE — 99024 POSTOP FOLLOW-UP VISIT: CPT | Performed by: SURGERY

## 2025-06-12 PROCEDURE — 99214 OFFICE O/P EST MOD 30 MIN: CPT | Performed by: INTERNAL MEDICINE

## 2025-06-12 PROCEDURE — 1159F MED LIST DOCD IN RCRD: CPT | Performed by: INTERNAL MEDICINE

## 2025-06-12 PROCEDURE — 1126F AMNT PAIN NOTED NONE PRSNT: CPT | Performed by: INTERNAL MEDICINE

## 2025-06-12 PROCEDURE — 3077F SYST BP >= 140 MM HG: CPT | Performed by: INTERNAL MEDICINE

## 2025-06-12 PROCEDURE — 36415 COLL VENOUS BLD VENIPUNCTURE: CPT

## 2025-06-12 PROCEDURE — 85025 COMPLETE CBC W/AUTO DIFF WBC: CPT

## 2025-06-12 RX ORDER — ALBUTEROL SULFATE 90 UG/1
4 INHALANT RESPIRATORY (INHALATION) PRN
OUTPATIENT
Start: 2025-06-19

## 2025-06-12 RX ORDER — ACETAMINOPHEN 325 MG/1
650 TABLET ORAL
OUTPATIENT
Start: 2025-06-19

## 2025-06-12 RX ORDER — HYDROCORTISONE SODIUM SUCCINATE 100 MG/2ML
100 INJECTION INTRAMUSCULAR; INTRAVENOUS
OUTPATIENT
Start: 2025-06-19

## 2025-06-12 RX ORDER — ONDANSETRON 2 MG/ML
8 INJECTION INTRAMUSCULAR; INTRAVENOUS
OUTPATIENT
Start: 2025-06-19

## 2025-06-12 RX ORDER — SODIUM CHLORIDE 9 MG/ML
INJECTION, SOLUTION INTRAVENOUS CONTINUOUS
OUTPATIENT
Start: 2025-06-19

## 2025-06-12 RX ORDER — PREDNISONE 20 MG/1
20 TABLET ORAL DAILY
Qty: 30 TABLET | Refills: 2 | Status: SHIPPED | OUTPATIENT
Start: 2025-06-12

## 2025-06-12 RX ORDER — FAMOTIDINE 10 MG/ML
20 INJECTION, SOLUTION INTRAVENOUS
OUTPATIENT
Start: 2025-06-19

## 2025-06-12 RX ORDER — EPINEPHRINE 1 MG/ML
0.3 INJECTION, SOLUTION, CONCENTRATE INTRAVENOUS PRN
OUTPATIENT
Start: 2025-06-19

## 2025-06-12 RX ORDER — DIPHENHYDRAMINE HYDROCHLORIDE 50 MG/ML
50 INJECTION, SOLUTION INTRAMUSCULAR; INTRAVENOUS
OUTPATIENT
Start: 2025-06-19

## 2025-06-12 NOTE — PATIENT INSTRUCTIONS
Standing order for cbc week  Rv in 4 weeks.   Please see orders for Nplate. Please send for parenthesization, do not start until instructed.

## 2025-06-12 NOTE — TELEPHONE ENCOUNTER
Instructions   from Dr. Bia Coon MD    Standing order for cbc week  Rv in 4 weeks.   Please see orders for Nplate. Please send for parenthesization, do not start until instructed.      RV scheduled 07/15/25, pt declined sooner appt. Advised would received a call regarding parenthesization and Nplate. He expressed an understanding.

## 2025-06-12 NOTE — PROGRESS NOTES
DIAGNOSIS:    ITP   Multiple adenopathy   CURRENT THERAPY:  Received Steroids with no response  IVIG with short lived response June/24  Rituximab, started July/2024  Unfortunately, short response to rituximab, plan to transition to avatrombopag September/2024 2/2025, relapsed disease with worsening platelet count.  Managed with adjusted dose of avatrombopag  Dose escalated avatrombopag led to excellent response  4/2025, relapsed disease with worsening thrombocytopenia.  Received IVIG  Plan to transition to fostamatinib  No response to fostamatinib, underwent splenectomy June/2025  Considering postsplenectomy treatment  BRIEF CASE HISTORY:       The patient is a 74 y.o.   male who is admitted to the hospital for petechiae and was found to have severe thrombocytopenia which is new onset.  The patient describes fatigue, mucosal bleeding from the mouth.  No hemorrhagic diatheses otherwise.  No hematuria or blood in the stool.   The patient was here couple weeks ago with low platelets and was diagnosed with ITP.  We wanted to do a bone marrow aspiration and biopsy but could not be done at while in-house.  The patient was steroid refractory and responded briefly to IVIG.  Also CT scan showed multiple and lymphadenopathy concerning for lymphomatous masses, especially in mediastinum and retroperitoneum   Pt received Rituximab, bone marrow bx showed no primary BM disorder. 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   He received 4 doses of rituximab which were well-tolerated he had a good but brief response and relapsed quickly.  We decided to treat him with avatrombopag with excellent response  In February/2025, his ITP relapsed so we increased the dose.  That was associate with excellent response.  Unfortunately the response was short-lived and he was admitted in April/2025 with recurrent thrombocytopenia.  CT scan showed persistent lymphadenopathy and multiple small lung nodules.  He received IVIG with improvement.  The plan is to

## 2025-06-12 NOTE — PROGRESS NOTES
[facility] MIN INVASIVE BARIATRIC SURG  [address]  [address]  [place] [id number]  Dept: [phone]    ROBOTIC AND MINIMALLY INVASIVE SURGERY  PROGRESS NOTE     CC: General Surgery Follow Up    Patient: Bruno Morales      Service Date: 06/12/2025  Visit:   1 week    Medical Record #: [id number]  Date of Surgery:   06/04/2025    History: 74 y.o. male who presents today for a post op follow up after laparoscopic splenectomy. His significant other is present for the visit today. He reports he is doing well. He reports he has had a bowel movement. He reports he is moving well around the house. He reports he is eating well. He is seeing Dr. Coon later today who will order labs.     Patient reports regular bowel movements. He denies nausea, vomiting, fever, and chills. He states the pain is well controlled.    Pathology:    [] NA    [] No Gross path    [x] Discussed w/ pt   [] Referred to GI    Spleen:   -Congestion.   -Fibrous capsular plaques.   -Negative for atypia and malignancy.   -Clinical immune thrombocytopenia.     Review of Systems:     General  Negative for: [] Weight Change   [x] Fatigue   [x] Fevers & Chills    [] Appetite change [] Other:    Positive for: [x] Weight Change   [] Fatigue   [] Fevers & Chills    [] Appetite change [] Other:   Cardiac  Negative for: [x] Chest Pain   [] Difficulty Breathing   [] Leg Cramps [x] Edema of Lower Extremeties    [] Left   [] Right      Positive for: [] Chest Pain   [] Difficulty Breathing   [] Leg Cramps [] Edema of Lower Extremeties    [] Left   [] Right   Pulmonary  Negative for: [x] Shortness of Breath [] Wheeze [x] Cough  [x] Calf Pain     Positive for: [] Shortness of Breath [] Wheeze [] Cough  [] Calf Pain   Gastro-Intestinal Negative for: [] Heartburn   [] Reflux   [] Dysphagia   [] Melena   [] BRBPR  [x] Vomiting   [x] Abdominal Pain   [] Diarrhea     [] Constipation  [] Other:     Positive for:

## 2025-06-19 ENCOUNTER — HOSPITAL ENCOUNTER (OUTPATIENT)
Age: 75
Discharge: HOME | End: 2025-06-19
Payer: MEDICARE

## 2025-06-19 DIAGNOSIS — D69.3 ACUTE IDIOPATHIC THROMBOCYTOPENIC PURPURA (HCC): ICD-10-CM

## 2025-06-19 LAB
BASOPHILS # BLD: 0 K/UL (ref 0–0.2)
BASOPHILS NFR BLD: 1 % (ref 0–2)
EOSINOPHIL # BLD: 0.1 K/UL (ref 0–0.4)
EOSINOPHILS RELATIVE PERCENT: 2 % (ref 1–4)
ERYTHROCYTE [DISTWIDTH] IN BLOOD BY AUTOMATED COUNT: 18.3 % (ref 12.5–15.4)
HCT VFR BLD AUTO: 38.5 % (ref 41–53)
HGB BLD-MCNC: 12.4 G/DL (ref 13.5–17.5)
LYMPHOCYTES NFR BLD: 0.7 K/UL (ref 1–4.8)
LYMPHOCYTES RELATIVE PERCENT: 8 % (ref 24–44)
MCH RBC QN AUTO: 29 PG (ref 26–34)
MCHC RBC AUTO-ENTMCNC: 32.3 G/DL (ref 31–37)
MCV RBC AUTO: 89.8 FL (ref 80–100)
MONOCYTES NFR BLD: 0.6 K/UL (ref 0.1–1.2)
MONOCYTES NFR BLD: 7 % (ref 2–11)
NEUTROPHILS NFR BLD: 82 % (ref 36–66)
NEUTS SEG NFR BLD: 7.4 K/UL (ref 1.8–7.7)
PLATELET # BLD AUTO: 101 K/UL (ref 140–450)
PMV BLD AUTO: 11.8 FL (ref 6–12)
RBC # BLD AUTO: 4.29 M/UL (ref 4.5–5.9)
WBC OTHER # BLD: 8.8 K/UL (ref 3.5–11)

## 2025-06-19 PROCEDURE — 36415 COLL VENOUS BLD VENIPUNCTURE: CPT

## 2025-06-19 PROCEDURE — 85025 COMPLETE CBC W/AUTO DIFF WBC: CPT

## 2025-06-22 PROBLEM — D69.3 IDIOPATHIC THROMBOCYTOPENIA PURPURA (HCC): Status: ACTIVE | Noted: 2025-06-22

## 2025-06-22 PROBLEM — Z90.81 POST-SPLENECTOMY: Status: ACTIVE | Noted: 2025-06-22

## 2025-06-26 ENCOUNTER — HOSPITAL ENCOUNTER (OUTPATIENT)
Age: 75
Discharge: HOME | End: 2025-06-26
Payer: MEDICARE

## 2025-06-26 DIAGNOSIS — D69.3 ACUTE IDIOPATHIC THROMBOCYTOPENIC PURPURA (HCC): ICD-10-CM

## 2025-06-26 LAB
BASOPHILS # BLD: 0 K/UL (ref 0–0.2)
BASOPHILS NFR BLD: 0 % (ref 0–2)
EOSINOPHIL # BLD: 0.37 K/UL (ref 0–0.4)
EOSINOPHILS RELATIVE PERCENT: 4 % (ref 1–4)
ERYTHROCYTE [DISTWIDTH] IN BLOOD BY AUTOMATED COUNT: 17.6 % (ref 12.5–15.4)
HCT VFR BLD AUTO: 38 % (ref 41–53)
HGB BLD-MCNC: 12.5 G/DL (ref 13.5–17.5)
LYMPHOCYTES NFR BLD: 1.56 K/UL (ref 1–4.8)
LYMPHOCYTES RELATIVE PERCENT: 17 % (ref 24–44)
MCH RBC QN AUTO: 29.5 PG (ref 26–34)
MCHC RBC AUTO-ENTMCNC: 32.8 G/DL (ref 31–37)
MCV RBC AUTO: 90 FL (ref 80–100)
MONOCYTES NFR BLD: 1.29 K/UL (ref 0.1–0.8)
MONOCYTES NFR BLD: 14 % (ref 1–7)
MORPHOLOGY: NORMAL
NEUTROPHILS NFR BLD: 65 % (ref 36–66)
NEUTS SEG NFR BLD: 5.98 K/UL (ref 1.8–7.7)
PLATELET # BLD AUTO: 266 K/UL (ref 140–450)
PMV BLD AUTO: 10.5 FL (ref 6–12)
RBC # BLD AUTO: 4.23 M/UL (ref 4.5–5.9)
WBC OTHER # BLD: 9.2 K/UL (ref 3.5–11)

## 2025-06-26 PROCEDURE — 36415 COLL VENOUS BLD VENIPUNCTURE: CPT

## 2025-06-26 PROCEDURE — 85025 COMPLETE CBC W/AUTO DIFF WBC: CPT

## 2025-07-01 PROBLEM — Z01.810 PREOP CARDIOVASCULAR EXAM: Status: RESOLVED | Noted: 2025-06-01 | Resolved: 2025-07-01

## 2025-07-03 ENCOUNTER — HOSPITAL ENCOUNTER (OUTPATIENT)
Age: 75
Discharge: HOME | End: 2025-07-03
Payer: MEDICARE

## 2025-07-03 DIAGNOSIS — D69.3 ACUTE IDIOPATHIC THROMBOCYTOPENIC PURPURA (HCC): ICD-10-CM

## 2025-07-03 LAB
BASOPHILS # BLD: 0.12 K/UL (ref 0–0.2)
BASOPHILS NFR BLD: 1 % (ref 0–2)
EOSINOPHIL # BLD: 0.12 K/UL (ref 0–0.4)
EOSINOPHILS RELATIVE PERCENT: 1 % (ref 1–4)
ERYTHROCYTE [DISTWIDTH] IN BLOOD BY AUTOMATED COUNT: 16.5 % (ref 12.5–15.4)
HCT VFR BLD AUTO: 37.9 % (ref 41–53)
HGB BLD-MCNC: 12.4 G/DL (ref 13.5–17.5)
LYMPHOCYTES NFR BLD: 1.04 K/UL (ref 1–4.8)
LYMPHOCYTES RELATIVE PERCENT: 9 % (ref 24–44)
MCH RBC QN AUTO: 29.1 PG (ref 26–34)
MCHC RBC AUTO-ENTMCNC: 32.6 G/DL (ref 31–37)
MCV RBC AUTO: 89.2 FL (ref 80–100)
MONOCYTES NFR BLD: 1.39 K/UL (ref 0.1–1.2)
MONOCYTES NFR BLD: 12 % (ref 2–11)
MORPHOLOGY: NORMAL
NEUTROPHILS NFR BLD: 77 % (ref 36–66)
NEUTS SEG NFR BLD: 8.93 K/UL (ref 1.8–7.7)
PLATELET # BLD AUTO: 140 K/UL (ref 140–450)
PMV BLD AUTO: 10.9 FL (ref 6–12)
RBC # BLD AUTO: 4.25 M/UL (ref 4.5–5.9)
WBC OTHER # BLD: 11.6 K/UL (ref 3.5–11)

## 2025-07-03 PROCEDURE — 85025 COMPLETE CBC W/AUTO DIFF WBC: CPT

## 2025-07-03 PROCEDURE — 36415 COLL VENOUS BLD VENIPUNCTURE: CPT

## 2025-07-10 ENCOUNTER — HOSPITAL ENCOUNTER (OUTPATIENT)
Age: 75
Discharge: HOME | End: 2025-07-10
Payer: MEDICARE

## 2025-07-10 DIAGNOSIS — D69.3 ACUTE IDIOPATHIC THROMBOCYTOPENIC PURPURA (HCC): ICD-10-CM

## 2025-07-10 LAB
BASOPHILS # BLD: 0 K/UL (ref 0–0.2)
BASOPHILS NFR BLD: 0 % (ref 0–2)
EOSINOPHIL # BLD: 0.13 K/UL (ref 0–0.4)
EOSINOPHILS RELATIVE PERCENT: 1 % (ref 1–4)
ERYTHROCYTE [DISTWIDTH] IN BLOOD BY AUTOMATED COUNT: 16.8 % (ref 12.5–15.4)
HCT VFR BLD AUTO: 37.8 % (ref 41–53)
HGB BLD-MCNC: 12.2 G/DL (ref 13.5–17.5)
LYMPHOCYTES NFR BLD: 1.61 K/UL (ref 1–4.8)
LYMPHOCYTES RELATIVE PERCENT: 12 % (ref 24–44)
MCH RBC QN AUTO: 28.7 PG (ref 26–34)
MCHC RBC AUTO-ENTMCNC: 32.3 G/DL (ref 31–37)
MCV RBC AUTO: 88.9 FL (ref 80–100)
METAMYELOCYTES ABSOLUTE COUNT: 0.13 K/UL
METAMYELOCYTES: 1 %
MONOCYTES NFR BLD: 1.07 K/UL (ref 0.1–0.8)
MONOCYTES NFR BLD: 8 % (ref 1–7)
MORPHOLOGY: ABNORMAL
MYELOCYTES ABSOLUTE COUNT: 0.13 K/UL
MYELOCYTES: 1 %
NEUTROPHILS NFR BLD: 77 % (ref 36–66)
NEUTS SEG NFR BLD: 10.33 K/UL (ref 1.8–7.7)
PLATELET # BLD AUTO: 116 K/UL (ref 140–450)
PMV BLD AUTO: 11.3 FL (ref 6–12)
RBC # BLD AUTO: 4.25 M/UL (ref 4.5–5.9)
WBC OTHER # BLD: 13.4 K/UL (ref 3.5–11)

## 2025-07-10 PROCEDURE — 36415 COLL VENOUS BLD VENIPUNCTURE: CPT

## 2025-07-10 PROCEDURE — 85025 COMPLETE CBC W/AUTO DIFF WBC: CPT

## 2025-07-11 ENCOUNTER — OFFICE VISIT (OUTPATIENT)
Dept: BARIATRICS/WEIGHT MGMT | Age: 75
End: 2025-07-11

## 2025-07-11 VITALS
HEART RATE: 59 BPM | HEIGHT: 70 IN | BODY MASS INDEX: 22.48 KG/M2 | WEIGHT: 157 LBS | OXYGEN SATURATION: 96 % | TEMPERATURE: 98.2 F | SYSTOLIC BLOOD PRESSURE: 130 MMHG | DIASTOLIC BLOOD PRESSURE: 72 MMHG

## 2025-07-11 DIAGNOSIS — Z90.81 S/P SPLENECTOMY: Primary | ICD-10-CM

## 2025-07-11 DIAGNOSIS — D69.3 CHRONIC ITP (IDIOPATHIC THROMBOCYTOPENIC PURPURA) (HCC): ICD-10-CM

## 2025-07-11 PROCEDURE — 99024 POSTOP FOLLOW-UP VISIT: CPT | Performed by: SURGERY

## 2025-07-11 RX ORDER — CARVEDILOL 25 MG/1
12.5 TABLET ORAL 2 TIMES DAILY WITH MEALS
COMMUNITY
Start: 2025-06-25 | End: 2025-07-11 | Stop reason: SDUPTHER

## 2025-07-11 NOTE — PROGRESS NOTES
[x] Abdominal Pain   [] Diarrhea     [] Constipation  [] Other:     Positive for: [] Heartburn   [] Reflux   [] Dysphagia   [] Melena   [] BRBPR  [] Vomiting   [] Abdominal Pain   [] Diarrhea     [] Constipation  [] Other:    Muskuloskeletal Negative for: [] Joint pain   [] Back pain   [] Knee Pain   [] Muscle weakness [x] Hernia   [x] Edema [] Other:     Positive for: [] Joint pain   [] Back pain   [] Knee Pain   [] Muscle weakness [] Hernia   [] Edema [] Other:    Neurologic Negative for: [x] Syncope   [] Insomnia   [x] Being treated for depression  [] Other:     Positive for: [] Syncope   [] Insomnia   [] Being treated for depression  [] Other:    Skin Negative for: [x] Wound:   [] Open   [] Draining   [] Incisional     [x] Rash   [] Hair Loss  [] Other:     Positive for: [] Wound:   [] Open   [] Draining    [] Incisional  [] Rash   [] Hair Loss  [] Other:          Physical Assessment:     /72 (BP Site: Left Upper Arm, Patient Position: Sitting, BP Cuff Size: Small Adult)   Pulse 59   Temp 98.2 °F (36.8 °C) (Oral)   Ht 1.778 m (5' 10\")   Wt 71.2 kg (157 lb)   SpO2 96%   BMI 22.53 kg/m²   General Negative for:  [x] Lapses in memory   [x] Unusual Stress   [x] Diffic Concen [] Unable to sleep   [] Eating in response to stress   [] Other:    Positive for:  [] Lapses in memory   [] Unusual Stress   [] Diffic Concen [] Unable to sleep   [] Eating in response to stress   [] Other:   Cardio-Pulmonary   [x] Heart RRR   [x] No murmurs   [] Pulses nl x4 extrem    [x] Good inspiratory effort   [x] No wheezing     [x] Lungs clear to auscultation bilaterally    [] Other:    Gastro-Intestinal   [x] Abd S/NT/ND/Benign   [x] No abdominal mass/hernia    [x] No Splenomegaly    [] Other:    Muskuloskeletal   [x] Good muscle strength x4 extremities   [x] nl gait and ambul    [x] Nl ROM x4 extremities    [] Other:    Neurologic   [x] Alert and oriented x3    [] Other:   Skin   [x] Intact w/ no open wounds   [x] Incisions

## 2025-07-15 ENCOUNTER — HOSPITAL ENCOUNTER (OUTPATIENT)
Age: 75
Discharge: HOME | End: 2025-07-15
Payer: MEDICARE

## 2025-07-15 ENCOUNTER — OFFICE VISIT (OUTPATIENT)
Age: 75
End: 2025-07-15
Payer: MEDICARE

## 2025-07-15 ENCOUNTER — TELEPHONE (OUTPATIENT)
Age: 75
End: 2025-07-15

## 2025-07-15 VITALS
RESPIRATION RATE: 18 BRPM | BODY MASS INDEX: 22.81 KG/M2 | WEIGHT: 159 LBS | HEART RATE: 61 BPM | OXYGEN SATURATION: 95 % | SYSTOLIC BLOOD PRESSURE: 149 MMHG | DIASTOLIC BLOOD PRESSURE: 82 MMHG | TEMPERATURE: 97 F

## 2025-07-15 DIAGNOSIS — D69.3 IDIOPATHIC THROMBOCYTOPENIC PURPURA (HCC): Primary | ICD-10-CM

## 2025-07-15 DIAGNOSIS — D69.3 ACUTE IDIOPATHIC THROMBOCYTOPENIC PURPURA (HCC): ICD-10-CM

## 2025-07-15 LAB
BASOPHILS # BLD: 0 K/UL (ref 0–0.2)
BASOPHILS NFR BLD: 0 % (ref 0–2)
EOSINOPHIL # BLD: 0 K/UL (ref 0–0.4)
EOSINOPHILS RELATIVE PERCENT: 0 % (ref 1–4)
ERYTHROCYTE [DISTWIDTH] IN BLOOD BY AUTOMATED COUNT: 15.7 % (ref 12.5–15.4)
HCT VFR BLD AUTO: 41.6 % (ref 41–53)
HGB BLD-MCNC: 13.5 G/DL (ref 13.5–17.5)
LYMPHOCYTES NFR BLD: 0.15 K/UL (ref 1–4.8)
LYMPHOCYTES RELATIVE PERCENT: 1 % (ref 24–44)
MCH RBC QN AUTO: 29 PG (ref 26–34)
MCHC RBC AUTO-ENTMCNC: 32.5 G/DL (ref 31–37)
MCV RBC AUTO: 89.5 FL (ref 80–100)
METAMYELOCYTES ABSOLUTE COUNT: 0.3 K/UL
METAMYELOCYTES: 2 %
MONOCYTES NFR BLD: 0 % (ref 1–7)
MONOCYTES NFR BLD: 0 K/UL (ref 0.1–0.8)
MORPHOLOGY: ABNORMAL
NEUTROPHILS NFR BLD: 97 % (ref 36–66)
NEUTS SEG NFR BLD: 14.65 K/UL (ref 1.8–7.7)
PLATELET # BLD AUTO: 210 K/UL (ref 140–450)
PMV BLD AUTO: 12.6 FL (ref 8–14)
RBC # BLD AUTO: 4.65 M/UL (ref 4.5–5.9)
WBC OTHER # BLD: 15.1 K/UL (ref 3.5–11)

## 2025-07-15 PROCEDURE — 3079F DIAST BP 80-89 MM HG: CPT | Performed by: INTERNAL MEDICINE

## 2025-07-15 PROCEDURE — 1125F AMNT PAIN NOTED PAIN PRSNT: CPT | Performed by: INTERNAL MEDICINE

## 2025-07-15 PROCEDURE — 85025 COMPLETE CBC W/AUTO DIFF WBC: CPT

## 2025-07-15 PROCEDURE — 3077F SYST BP >= 140 MM HG: CPT | Performed by: INTERNAL MEDICINE

## 2025-07-15 PROCEDURE — 36415 COLL VENOUS BLD VENIPUNCTURE: CPT

## 2025-07-15 PROCEDURE — 99214 OFFICE O/P EST MOD 30 MIN: CPT | Performed by: INTERNAL MEDICINE

## 2025-07-15 PROCEDURE — 1123F ACP DISCUSS/DSCN MKR DOCD: CPT | Performed by: INTERNAL MEDICINE

## 2025-07-15 NOTE — TELEPHONE ENCOUNTER
Need weekly cbc starting today.   Rv in 4 weeks.       Patient did have cbc done today. Patient is scheduled on 8/21 to follow up with [facility]. Patient was given AVS.

## 2025-07-24 ENCOUNTER — HOSPITAL ENCOUNTER (OUTPATIENT)
Age: 75
Discharge: HOME | End: 2025-07-24
Payer: MEDICARE

## 2025-07-24 DIAGNOSIS — D69.3 ACUTE IDIOPATHIC THROMBOCYTOPENIC PURPURA (HCC): ICD-10-CM

## 2025-07-24 LAB
BASOPHILS # BLD: 0.12 K/UL (ref 0–0.2)
BASOPHILS NFR BLD: 1 % (ref 0–2)
EOSINOPHIL # BLD: 0.36 K/UL (ref 0–0.4)
EOSINOPHILS RELATIVE PERCENT: 3 % (ref 1–4)
ERYTHROCYTE [DISTWIDTH] IN BLOOD BY AUTOMATED COUNT: 15.9 % (ref 12.5–15.4)
HCT VFR BLD AUTO: 39.3 % (ref 41–53)
HGB BLD-MCNC: 12.7 G/DL (ref 13.5–17.5)
LYMPHOCYTES NFR BLD: 0.97 K/UL (ref 1–4.8)
LYMPHOCYTES RELATIVE PERCENT: 8 % (ref 24–44)
MCH RBC QN AUTO: 28.1 PG (ref 26–34)
MCHC RBC AUTO-ENTMCNC: 32.2 G/DL (ref 31–37)
MCV RBC AUTO: 87.1 FL (ref 80–100)
MONOCYTES NFR BLD: 0.97 K/UL (ref 0.1–0.8)
MONOCYTES NFR BLD: 8 % (ref 1–7)
MORPHOLOGY: NORMAL
MYELOCYTES ABSOLUTE COUNT: 0.24 K/UL
MYELOCYTES: 2 %
NEUTROPHILS NFR BLD: 78 % (ref 36–66)
NEUTS SEG NFR BLD: 9.44 K/UL (ref 1.8–7.7)
PLATELET # BLD AUTO: 243 K/UL (ref 140–450)
PMV BLD AUTO: 9.8 FL (ref 6–12)
RBC # BLD AUTO: 4.51 M/UL (ref 4.5–5.9)
WBC OTHER # BLD: 12.1 K/UL (ref 3.5–11)

## 2025-07-24 PROCEDURE — 36415 COLL VENOUS BLD VENIPUNCTURE: CPT

## 2025-07-24 PROCEDURE — 85025 COMPLETE CBC W/AUTO DIFF WBC: CPT

## 2025-07-31 ENCOUNTER — HOSPITAL ENCOUNTER (OUTPATIENT)
Age: 75
Discharge: HOME | End: 2025-07-31
Payer: MEDICARE

## 2025-07-31 DIAGNOSIS — D69.3 ACUTE IDIOPATHIC THROMBOCYTOPENIC PURPURA (HCC): ICD-10-CM

## 2025-07-31 LAB
BASOPHILS # BLD: 0 K/UL (ref 0–0.2)
BASOPHILS NFR BLD: 0 % (ref 0–2)
EOSINOPHIL # BLD: 0.36 K/UL (ref 0–0.4)
EOSINOPHILS RELATIVE PERCENT: 3 % (ref 1–4)
ERYTHROCYTE [DISTWIDTH] IN BLOOD BY AUTOMATED COUNT: 15.5 % (ref 12.5–15.4)
HCT VFR BLD AUTO: 38.7 % (ref 41–53)
HGB BLD-MCNC: 12.6 G/DL (ref 13.5–17.5)
LYMPHOCYTES NFR BLD: 0.72 K/UL (ref 1–4.8)
LYMPHOCYTES RELATIVE PERCENT: 6 % (ref 24–44)
MCH RBC QN AUTO: 28.4 PG (ref 26–34)
MCHC RBC AUTO-ENTMCNC: 32.5 G/DL (ref 31–37)
MCV RBC AUTO: 87.2 FL (ref 80–100)
MONOCYTES NFR BLD: 0.36 K/UL (ref 0.1–0.8)
MONOCYTES NFR BLD: 3 % (ref 1–7)
MORPHOLOGY: NORMAL
NEUTROPHILS NFR BLD: 88 % (ref 36–66)
NEUTS SEG NFR BLD: 10.56 K/UL (ref 1.8–7.7)
PLATELET # BLD AUTO: 200 K/UL (ref 140–450)
PMV BLD AUTO: 9.5 FL (ref 6–12)
RBC # BLD AUTO: 4.44 M/UL (ref 4.5–5.9)
WBC OTHER # BLD: 12 K/UL (ref 3.5–11)

## 2025-07-31 PROCEDURE — 85025 COMPLETE CBC W/AUTO DIFF WBC: CPT

## 2025-07-31 PROCEDURE — 36415 COLL VENOUS BLD VENIPUNCTURE: CPT

## 2025-08-07 ENCOUNTER — HOSPITAL ENCOUNTER (OUTPATIENT)
Age: 75
Discharge: HOME | End: 2025-08-07
Payer: MEDICARE

## 2025-08-07 DIAGNOSIS — D69.3 ACUTE IDIOPATHIC THROMBOCYTOPENIC PURPURA (HCC): ICD-10-CM

## 2025-08-07 LAB
BASOPHILS # BLD: 0 K/UL (ref 0–0.2)
BASOPHILS NFR BLD: 0 % (ref 0–2)
EOSINOPHIL # BLD: 0.13 K/UL (ref 0–0.4)
EOSINOPHILS RELATIVE PERCENT: 1 % (ref 1–4)
ERYTHROCYTE [DISTWIDTH] IN BLOOD BY AUTOMATED COUNT: 15.8 % (ref 12.5–15.4)
HCT VFR BLD AUTO: 39.1 % (ref 41–53)
HGB BLD-MCNC: 12.7 G/DL (ref 13.5–17.5)
LYMPHOCYTES NFR BLD: 0.38 K/UL (ref 1–4.8)
LYMPHOCYTES RELATIVE PERCENT: 3 % (ref 24–44)
MCH RBC QN AUTO: 28 PG (ref 26–34)
MCHC RBC AUTO-ENTMCNC: 32.4 G/DL (ref 31–37)
MCV RBC AUTO: 86.5 FL (ref 80–100)
MONOCYTES NFR BLD: 0.75 K/UL (ref 0.1–0.8)
MONOCYTES NFR BLD: 6 % (ref 1–7)
MORPHOLOGY: NORMAL
NEUTROPHILS NFR BLD: 90 % (ref 36–66)
NEUTS SEG NFR BLD: 11.24 K/UL (ref 1.8–7.7)
PLATELET # BLD AUTO: 319 K/UL (ref 140–450)
PMV BLD AUTO: 10.1 FL (ref 6–12)
RBC # BLD AUTO: 4.52 M/UL (ref 4.5–5.9)
WBC OTHER # BLD: 12.5 K/UL (ref 3.5–11)

## 2025-08-07 PROCEDURE — 36415 COLL VENOUS BLD VENIPUNCTURE: CPT

## 2025-08-07 PROCEDURE — 85025 COMPLETE CBC W/AUTO DIFF WBC: CPT

## 2025-08-14 ENCOUNTER — HOSPITAL ENCOUNTER (OUTPATIENT)
Age: 75
Discharge: HOME | End: 2025-08-14
Payer: MEDICARE

## 2025-08-14 DIAGNOSIS — D69.3 ACUTE IDIOPATHIC THROMBOCYTOPENIC PURPURA (HCC): ICD-10-CM

## 2025-08-14 LAB
BASOPHILS # BLD: 0.11 K/UL (ref 0–0.2)
BASOPHILS NFR BLD: 1 % (ref 0–2)
EOSINOPHIL # BLD: 0.11 K/UL (ref 0–0.4)
EOSINOPHILS RELATIVE PERCENT: 1 % (ref 1–4)
ERYTHROCYTE [DISTWIDTH] IN BLOOD BY AUTOMATED COUNT: 15.6 % (ref 12.5–15.4)
HCT VFR BLD AUTO: 39 % (ref 41–53)
HGB BLD-MCNC: 12.6 G/DL (ref 13.5–17.5)
LYMPHOCYTES NFR BLD: 1.02 K/UL (ref 1–4.8)
LYMPHOCYTES RELATIVE PERCENT: 9 % (ref 24–44)
MCH RBC QN AUTO: 27.7 PG (ref 26–34)
MCHC RBC AUTO-ENTMCNC: 32.2 G/DL (ref 31–37)
MCV RBC AUTO: 86 FL (ref 80–100)
METAMYELOCYTES ABSOLUTE COUNT: 0.11 K/UL
METAMYELOCYTES: 1 %
MONOCYTES NFR BLD: 0.9 K/UL (ref 0.1–0.8)
MONOCYTES NFR BLD: 8 % (ref 1–7)
MORPHOLOGY: NORMAL
MYELOCYTES ABSOLUTE COUNT: 0.34 K/UL
MYELOCYTES: 3 %
NEUTROPHILS NFR BLD: 77 % (ref 36–66)
NEUTS SEG NFR BLD: 8.71 K/UL (ref 1.8–7.7)
PLATELET # BLD AUTO: 309 K/UL (ref 140–450)
PMV BLD AUTO: 8.6 FL (ref 6–12)
RBC # BLD AUTO: 4.54 M/UL (ref 4.5–5.9)
WBC OTHER # BLD: 11.3 K/UL (ref 3.5–11)

## 2025-08-14 PROCEDURE — 85025 COMPLETE CBC W/AUTO DIFF WBC: CPT

## 2025-08-14 PROCEDURE — 36415 COLL VENOUS BLD VENIPUNCTURE: CPT

## 2025-08-21 ENCOUNTER — HOSPITAL ENCOUNTER (OUTPATIENT)
Age: 75
Discharge: HOME | End: 2025-08-21
Payer: MEDICARE

## 2025-08-21 ENCOUNTER — TELEPHONE (OUTPATIENT)
Age: 75
End: 2025-08-21

## 2025-08-21 ENCOUNTER — OFFICE VISIT (OUTPATIENT)
Age: 75
End: 2025-08-21
Payer: MEDICARE

## 2025-08-21 VITALS
WEIGHT: 164.8 LBS | HEART RATE: 108 BPM | OXYGEN SATURATION: 100 % | TEMPERATURE: 96.6 F | RESPIRATION RATE: 15 BRPM | DIASTOLIC BLOOD PRESSURE: 78 MMHG | SYSTOLIC BLOOD PRESSURE: 154 MMHG | BODY MASS INDEX: 23.65 KG/M2

## 2025-08-21 DIAGNOSIS — D69.3 ACUTE IDIOPATHIC THROMBOCYTOPENIC PURPURA (HCC): ICD-10-CM

## 2025-08-21 DIAGNOSIS — D69.3 IDIOPATHIC THROMBOCYTOPENIC PURPURA (HCC): Primary | ICD-10-CM

## 2025-08-21 DIAGNOSIS — R59.0 MEDIASTINAL ADENOPATHY: ICD-10-CM

## 2025-08-21 LAB
BASOPHILS # BLD: 0 K/UL (ref 0–0.2)
BASOPHILS NFR BLD: 0 % (ref 0–2)
EOSINOPHIL # BLD: 0 K/UL (ref 0–0.4)
EOSINOPHILS RELATIVE PERCENT: 0 % (ref 1–4)
ERYTHROCYTE [DISTWIDTH] IN BLOOD BY AUTOMATED COUNT: 15.6 % (ref 12.5–15.4)
HCT VFR BLD AUTO: 41 % (ref 41–53)
HGB BLD-MCNC: 13.1 G/DL (ref 13.5–17.5)
LYMPHOCYTES NFR BLD: 0.78 K/UL (ref 1–4.8)
LYMPHOCYTES RELATIVE PERCENT: 6 % (ref 24–44)
MCH RBC QN AUTO: 27.8 PG (ref 26–34)
MCHC RBC AUTO-ENTMCNC: 31.9 G/DL (ref 31–37)
MCV RBC AUTO: 87.3 FL (ref 80–100)
METAMYELOCYTES ABSOLUTE COUNT: 0.13 K/UL
METAMYELOCYTES: 1 %
MONOCYTES NFR BLD: 0.78 K/UL (ref 0.1–0.8)
MONOCYTES NFR BLD: 6 % (ref 1–7)
MORPHOLOGY: NORMAL
NEUTROPHILS NFR BLD: 87 % (ref 36–66)
NEUTS SEG NFR BLD: 11.31 K/UL (ref 1.8–7.7)
PLATELET # BLD AUTO: 302 K/UL (ref 140–450)
PMV BLD AUTO: 9.8 FL (ref 6–12)
RBC # BLD AUTO: 4.7 M/UL (ref 4.5–5.9)
WBC OTHER # BLD: 13 K/UL (ref 3.5–11)

## 2025-08-21 PROCEDURE — 1159F MED LIST DOCD IN RCRD: CPT | Performed by: INTERNAL MEDICINE

## 2025-08-21 PROCEDURE — 36415 COLL VENOUS BLD VENIPUNCTURE: CPT

## 2025-08-21 PROCEDURE — 99214 OFFICE O/P EST MOD 30 MIN: CPT | Performed by: INTERNAL MEDICINE

## 2025-08-21 PROCEDURE — 1123F ACP DISCUSS/DSCN MKR DOCD: CPT | Performed by: INTERNAL MEDICINE

## 2025-08-21 PROCEDURE — 1126F AMNT PAIN NOTED NONE PRSNT: CPT | Performed by: INTERNAL MEDICINE

## 2025-08-21 PROCEDURE — 3078F DIAST BP <80 MM HG: CPT | Performed by: INTERNAL MEDICINE

## 2025-08-21 PROCEDURE — 3077F SYST BP >= 140 MM HG: CPT | Performed by: INTERNAL MEDICINE

## 2025-08-21 PROCEDURE — 85025 COMPLETE CBC W/AUTO DIFF WBC: CPT

## 2025-08-21 RX ORDER — PREDNISONE 10 MG/1
10 TABLET ORAL DAILY
Qty: 30 TABLET | Refills: 2 | Status: SHIPPED | OUTPATIENT
Start: 2025-08-21

## 2025-08-28 ENCOUNTER — OFFICE VISIT (OUTPATIENT)
Age: 75
End: 2025-08-28

## 2025-08-28 VITALS
SYSTOLIC BLOOD PRESSURE: 154 MMHG | WEIGHT: 165 LBS | BODY MASS INDEX: 23.68 KG/M2 | DIASTOLIC BLOOD PRESSURE: 88 MMHG | HEART RATE: 64 BPM

## 2025-08-28 DIAGNOSIS — N18.32 CKD STAGE 3B, GFR 30-44 ML/MIN (HCC): ICD-10-CM

## 2025-08-28 DIAGNOSIS — K21.9 GASTROESOPHAGEAL REFLUX DISEASE WITHOUT ESOPHAGITIS: ICD-10-CM

## 2025-08-28 DIAGNOSIS — E78.5 DYSLIPIDEMIA: ICD-10-CM

## 2025-08-28 DIAGNOSIS — I10 ESSENTIAL HYPERTENSION: ICD-10-CM

## 2025-08-28 DIAGNOSIS — D69.3 IDIOPATHIC THROMBOCYTOPENIC PURPURA (HCC): ICD-10-CM

## 2025-08-28 DIAGNOSIS — I49.3 PVC (PREMATURE VENTRICULAR CONTRACTION): Primary | ICD-10-CM

## 2025-08-28 DIAGNOSIS — D64.9 ANEMIA, NORMOCYTIC NORMOCHROMIC: ICD-10-CM

## 2025-08-28 RX ORDER — BUSPIRONE HYDROCHLORIDE 10 MG/1
10 TABLET ORAL 2 TIMES DAILY
COMMUNITY
Start: 2025-08-14

## (undated) DEVICE — GUIDEWIRE VASC J 3 MM 0.035 INX210 CM FIX COR INQWIRE

## (undated) DEVICE — SUTURE NONABSORBABLE MONOFILAMENT 2-0 FS 18 IN ETHILON 664H

## (undated) DEVICE — SUTURE MONOCRYL SZ 4-0 L18IN ABSRB UD L16MM PC-3 3/8 CIR PRIM Y845G

## (undated) DEVICE — KIT DETRGNT ENZYMATIC SOLUTION 100 ML SOAK SHLD 5 VI LG HUMD

## (undated) DEVICE — STRAP ARMBRD W1.5XL32IN FOAM STR YET SFT W/ HK AND LOOP

## (undated) DEVICE — GAUZE,SPONGE,FLUFF,6"X6.75",STRL,5/TRAY: Brand: MEDLINE

## (undated) DEVICE — SUCTION IRRIGATOR: Brand: ENDOWRIST

## (undated) DEVICE — TOWEL,OR,DSP,ST,NATURAL,DLX,4/PK,20PK/CS: Brand: MEDLINE

## (undated) DEVICE — STAPLER 60: Brand: SUREFORM

## (undated) DEVICE — STAPLER 60 RELOAD WHITE: Brand: SUREFORM

## (undated) DEVICE — SUTURE VICRYL + SZ 0 L27IN ABSRB VLT L26MM UR-6 5/8 CIR VCP603H

## (undated) DEVICE — BAND COMPR L24CM REG CLR PLAS HEMSTAT EXT HK AND LOOP RETEN

## (undated) DEVICE — VESSEL SEALER EXTEND: Brand: ENDOWRIST

## (undated) DEVICE — BINDER ABD 2XL W9IN CIRC 62 73IN 3 PNL E HK AND LOOP CLSR W

## (undated) DEVICE — SUTURE VIC + SH-13-0 27IN  VCP311H

## (undated) DEVICE — CATHETER DIAG AD 6FR L100CM COR GRN HYDRPHLC NYL JR 4 W/O

## (undated) DEVICE — TRAY SURG CUST CRD CATH TOLEDO

## (undated) DEVICE — SUTURE MONOCRYL + SZ 4-0 L18IN ABSRB UD L19MM PS-2 3/8 CIR MCP496G

## (undated) DEVICE — CATHETER ANGIO JL3.5 0.056 INX6 FRX100 CM THRULUMEN EXPO

## (undated) DEVICE — STAPLE INT BIOABSORBABLE REINF LN SUREFORM 60 GRN SEAMGRD

## (undated) DEVICE — SEAL

## (undated) DEVICE — DRAIN SURG 19FR 100% SIL RADPQ RND CHN FULL FLUT

## (undated) DEVICE — SUTURE VICRYL + SZ 0 L27IN ABSRB WHT CT-2 1/2 CIR TAPERPOINT VCP270H

## (undated) DEVICE — BAG SPEC LAP 9X7.5 IN 12 MM 1500 CC MEM WIRE CANN NYL

## (undated) DEVICE — TISSUE RETRIEVAL SYSTEM: Brand: INZII RETRIEVAL SYSTEM

## (undated) DEVICE — BLADELESS OBTURATOR: Brand: WECK VISTA

## (undated) DEVICE — KIT DRN FLAT W/ 100CC EVAC 7MM FULL PERF

## (undated) DEVICE — ARM DRAPE

## (undated) DEVICE — GLIDESHEATH SLENDER STAINLESS STEEL KIT: Brand: GLIDESHEATH SLENDER

## (undated) DEVICE — GARMENT,MEDLINE,DVT,INT,CALF,MED, GEN2: Brand: MEDLINE

## (undated) DEVICE — Device

## (undated) DEVICE — DRESSING TRNSPAR W5XL4.5IN FLM SHT SEMIPERMEABLE WIND

## (undated) DEVICE — COVER LT HNDL BLU PLAS